# Patient Record
Sex: FEMALE | Race: WHITE | Employment: FULL TIME | URBAN - METROPOLITAN AREA
[De-identification: names, ages, dates, MRNs, and addresses within clinical notes are randomized per-mention and may not be internally consistent; named-entity substitution may affect disease eponyms.]

---

## 2017-04-21 ENCOUNTER — HOSPITAL ENCOUNTER (OUTPATIENT)
Dept: MAMMOGRAPHY | Age: 52
Discharge: HOME | End: 2017-04-21
Attending: OBSTETRICS & GYNECOLOGY
Payer: COMMERCIAL

## 2017-04-21 DIAGNOSIS — Z12.31 VISIT FOR SCREENING MAMMOGRAM: ICD-10-CM

## 2017-04-21 PROCEDURE — 77067 SCR MAMMO BI INCL CAD: CPT

## 2017-04-25 ENCOUNTER — HOSPITAL ENCOUNTER (OUTPATIENT)
Dept: ULTRASOUND IMAGING | Age: 52
Discharge: HOME | End: 2017-04-25
Attending: OBSTETRICS & GYNECOLOGY
Payer: COMMERCIAL

## 2017-04-25 ENCOUNTER — HOSPITAL ENCOUNTER (OUTPATIENT)
Dept: MAMMOGRAPHY | Age: 52
Discharge: HOME | End: 2017-04-25
Attending: OBSTETRICS & GYNECOLOGY
Payer: COMMERCIAL

## 2017-04-25 DIAGNOSIS — R92.8 ABNORMAL MAMMOGRAM OF RIGHT BREAST: ICD-10-CM

## 2017-04-25 PROCEDURE — 77065 DX MAMMO INCL CAD UNI: CPT

## 2017-04-25 PROCEDURE — 76642 ULTRASOUND BREAST LIMITED: CPT

## 2017-06-05 ENCOUNTER — OFFICE VISIT (OUTPATIENT)
Dept: PRIMARY CARE CLINIC | Age: 52
End: 2017-06-05

## 2017-06-05 VITALS
WEIGHT: 127.2 LBS | HEART RATE: 84 BPM | OXYGEN SATURATION: 98 % | TEMPERATURE: 98.3 F | BODY MASS INDEX: 19.97 KG/M2 | RESPIRATION RATE: 16 BRPM | DIASTOLIC BLOOD PRESSURE: 81 MMHG | SYSTOLIC BLOOD PRESSURE: 114 MMHG | HEIGHT: 67 IN

## 2017-06-05 DIAGNOSIS — J01.00 ACUTE MAXILLARY SINUSITIS, RECURRENCE NOT SPECIFIED: ICD-10-CM

## 2017-06-05 DIAGNOSIS — J01.10 ACUTE FRONTAL SINUSITIS, RECURRENCE NOT SPECIFIED: Primary | ICD-10-CM

## 2017-06-05 DIAGNOSIS — R05.9 COUGH: ICD-10-CM

## 2017-06-05 RX ORDER — AMOXICILLIN AND CLAVULANATE POTASSIUM 875; 125 MG/1; MG/1
1 TABLET, FILM COATED ORAL EVERY 12 HOURS
Qty: 14 TAB | Refills: 0 | Status: SHIPPED | OUTPATIENT
Start: 2017-06-05 | End: 2017-06-12

## 2017-06-05 RX ORDER — CODEINE PHOSPHATE AND GUAIFENESIN 10; 100 MG/5ML; MG/5ML
5 SOLUTION ORAL
Qty: 120 ML | Refills: 0 | Status: SHIPPED | OUTPATIENT
Start: 2017-06-05 | End: 2017-08-16

## 2017-06-05 NOTE — PATIENT INSTRUCTIONS
Sinusitis: Care Instructions  Your Care Instructions    Sinusitis is an infection of the lining of the sinus cavities in your head. Sinusitis often follows a cold. It causes pain and pressure in your head and face. In most cases, sinusitis gets better on its own in 1 to 2 weeks. But some mild symptoms may last for several weeks. Sometimes antibiotics are needed. Follow-up care is a key part of your treatment and safety. Be sure to make and go to all appointments, and call your doctor if you are having problems. It's also a good idea to know your test results and keep a list of the medicines you take. How can you care for yourself at home? · Take an over-the-counter pain medicine, such as acetaminophen (Tylenol), ibuprofen (Advil, Motrin), or naproxen (Aleve). Read and follow all instructions on the label. · If the doctor prescribed antibiotics, take them as directed. Do not stop taking them just because you feel better. You need to take the full course of antibiotics. · Be careful when taking over-the-counter cold or flu medicines and Tylenol at the same time. Many of these medicines have acetaminophen, which is Tylenol. Read the labels to make sure that you are not taking more than the recommended dose. Too much acetaminophen (Tylenol) can be harmful. · Breathe warm, moist air from a steamy shower, a hot bath, or a sink filled with hot water. Avoid cold, dry air. Using a humidifier in your home may help. Follow the directions for cleaning the machine. · Use saline (saltwater) nasal washes to help keep your nasal passages open and wash out mucus and bacteria. You can buy saline nose drops at a grocery store or drugstore. Or you can make your own at home by adding 1 teaspoon of salt and 1 teaspoon of baking soda to 2 cups of distilled water. If you make your own, fill a bulb syringe with the solution, insert the tip into your nostril, and squeeze gently. Sudhir Alessandro your nose.   · Put a hot, wet towel or a warm gel pack on your face 3 or 4 times a day for 5 to 10 minutes each time. · Try a decongestant nasal spray like oxymetazoline (Afrin). Do not use it for more than 3 days in a row. Using it for more than 3 days can make your congestion worse. When should you call for help? Call your doctor now or seek immediate medical care if:  · You have new or worse swelling or redness in your face or around your eyes. · You have a new or higher fever. Watch closely for changes in your health, and be sure to contact your doctor if:  · You have new or worse facial pain. · The mucus from your nose becomes thicker (like pus) or has new blood in it. · You are not getting better as expected. Where can you learn more? Go to http://padmaja-tiffany.info/. Enter C819 in the search box to learn more about \"Sinusitis: Care Instructions. \"  Current as of: July 29, 2016  Content Version: 11.2  © 4958-7722 Healthwise, Incorporated. Care instructions adapted under license by [employer] (which disclaims liability or warranty for this information). If you have questions about a medical condition or this instruction, always ask your healthcare professional. [place] [address] any warranty or liability for your use of this information.

## 2017-06-05 NOTE — PROGRESS NOTES
Subjective:   Tree Stubbs is a 54 y.o. female who complains of congestion, dry cough, headache, pressure in ears, laryngitis, teeth ache, productive cough of green phlegm, feeling exhausted, and bilateral sinus pain for 6 days, gradually worsening since that time. She reports sweats at night, no measured temperatures. She denies a history of wheezing. She does feel SOB with activity. Evaluation to date: none. Treatment to date: OTC products - Sudafed, Flonase, Claritin, Mucinex, and Ibuprofen. Patient does not smoke cigarettes. Relevant PMH: History reviewed. No pertinent past medical history. Past Surgical History:   Procedure Laterality Date    HX  SECTION Bilateral     IMPLANT BREAST SILICONE/EQ Bilateral          No Known Allergies      PCP - Dr. Zuleyka Vergara. Cici      Review of Systems  Pertinent items are noted in HPI. Objective:     Visit Vitals    /81 (BP 1 Location: Left arm, BP Patient Position: Sitting)    Pulse 84    Temp 98.3 °F (36.8 °C) (Oral)    Resp 16    Ht 5' 7\" (1.702 m)    Wt 127 lb 3.2 oz (57.7 kg)    SpO2 98%    BMI 19.92 kg/m2     General:  alert, cooperative, no distress, sickly appearance   Eyes: negative   Ears: normal TM's and external ear canals AU   Sinuses: Normal paranasal sinuses without tenderness   Mouth:  Lips, mucosa, and tongue normal. Teeth and gums normal and normal findings: oropharynx pink & moist without lesions or evidence of thrush   Neck: supple, symmetrical, trachea midline and no adenopathy. Heart: S1 and S2 normal, no murmurs noted. Lungs: clear to auscultation bilaterally        Assessment/Plan:       ICD-10-CM ICD-9-CM    1. Acute frontal sinusitis, recurrence not specified J01.10 461.1    2. Acute maxillary sinusitis, recurrence not specified J01.00 461.0    3. Cough R05 786.2      Augmentin as directed  Discussed the dx and tx of sinusitis. Suggested symptomatic OTC remedies. Antibiotics per orders.   RTC nirmal.      Elisabeth Deshpande, NP

## 2017-06-05 NOTE — MR AVS SNAPSHOT
Visit Information Date & Time Provider Department Dept. Phone Encounter #  
 6/5/2017  8:30 AM Miles Sinclair NP [address] [place] [address] [phone] [id number] Follow-up Instructions Return if symptoms worsen or fail to improve. Upcoming Health Maintenance Date Due DTaP/Tdap/Td series (1 - Tdap) 12/26/1988 PAP AKA CERVICAL CYTOLOGY 12/26/1988 INFLUENZA AGE 9 TO ADULT 8/1/2017 Allergies as of 6/5/2017  Review Complete On: 6/5/2017 By: Miles Sinclair NP No Known Allergies Current Immunizations  Never Reviewed No immunizations on file. Not reviewed this visit You Were Diagnosed With   
  
 Codes Comments Acute frontal sinusitis, recurrence not specified    -  Primary ICD-10-CM: J01.10 ICD-9-CM: 954.1 Acute maxillary sinusitis, recurrence not specified     ICD-10-CM: J01.00 ICD-9-CM: 461.0 Cough     ICD-10-CM: R05 ICD-9-CM: 809. 2 Vitals BP Pulse Temp Resp Height(growth percentile) Weight(growth percentile) 114/81 (BP 1 Location: Left arm, BP Patient Position: Sitting) 84 98.3 °F (36.8 °C) (Oral) 16 5' 7\" (1.702 m) 127 lb 3.2 oz (57.7 kg) SpO2 BMI OB Status Smoking Status 98% 19.92 kg/m2 Having regular periods Never Smoker Vitals History BMI and BSA Data Body Mass Index Body Surface Area  
 19.92 kg/m 2 1.65 m 2 Preferred Pharmacy Pharmacy Name Phone [id number] [facility] [phone] Your Updated Medication List  
  
   
This list is accurate as of: 6/5/17  9:01 AM.  Always use your most recent med list.  
  
  
  
  
 amoxicillin-clavulanate 875-125 mg per tablet Commonly known as:  AUGMENTIN Take 1 Tab by mouth every twelve (12) hours for 7 days. guaiFENesin-codeine 100-10 mg/5 mL solution Commonly known as:  Boo Maggiek  
 Take 5 mL by mouth three (3) times daily as needed for Cough. Max Daily Amount: 15 mL.  
  
 multivitamin tablet Commonly known as:  ONE A DAY Take 1 Tab by mouth daily. norgestimate-ethinyl estradiol 0.18/0.215/0.25 mg-25 mcg Tab Commonly known as:  ORTHO TRI-CYCLEN LO  
  
  
  
  
Prescriptions Printed Refills  
 guaiFENesin-codeine (GUAIATUSSIN AC) 100-10 mg/5 mL solution 0 Sig: Take 5 mL by mouth three (3) times daily as needed for Cough. Max Daily Amount: 15 mL. Class: Print Route: Oral  
  
Prescriptions Sent to Pharmacy Refills  
 amoxicillin-clavulanate (AUGMENTIN) 875-125 mg per tablet 0 Sig: Take 1 Tab by mouth every twelve (12) hours for 7 days. Class: Normal  
 Pharmacy: [address] #: [phone] Route: Oral  
  
Follow-up Instructions Return if symptoms worsen or fail to improve. To-Do List   
 10/26/2017 8:30 AM  
  Appointment with [facility] 1 at [address] [place] [address] ([phone]) Shower or bathe using soap and water. Do not use deodorant, powder, perfumes, or lotion the day of your exam.  If your prior mammograms were not performed at [place] [address] please bring films with you or forward prior images 2 days before your procedure. If patient is not a callback diagnostic, the patient must have an order/script from the physician for the diagnostic. Please bring it on the day of the mammogram or have it faxed to the department. [facility] [place]  [phone] [facility] [phone] [address] [phone] [facility] [phone] [facility] [phone] Petr Humphrey [phone]  
  
 10/26/2017 9:00 AM  
  Appointment with [facility] 2 at [facility] Ultrasound ([phone]) Shower or bathe using soap and water. Do not use deodorant, powder, perfumes, or lotion.  If your prior films were not performed at a local [address] [facility] facility please bring or forward prior images 2 days before procedure. Patient Instructions Sinusitis: Care Instructions Your Care Instructions Sinusitis is an infection of the lining of the sinus cavities in your head. Sinusitis often follows a cold. It causes pain and pressure in your head and face. In most cases, sinusitis gets better on its own in 1 to 2 weeks. But some mild symptoms may last for several weeks. Sometimes antibiotics are needed. Follow-up care is a key part of your treatment and safety. Be sure to make and go to all appointments, and call your doctor if you are having problems. It's also a good idea to know your test results and keep a list of the medicines you take. How can you care for yourself at home? · Take an over-the-counter pain medicine, such as acetaminophen (Tylenol), ibuprofen (Advil, Motrin), or naproxen (Aleve). Read and follow all instructions on the label. · If the doctor prescribed antibiotics, take them as directed. Do not stop taking them just because you feel better. You need to take the full course of antibiotics. · Be careful when taking over-the-counter cold or flu medicines and Tylenol at the same time. Many of these medicines have acetaminophen, which is Tylenol. Read the labels to make sure that you are not taking more than the recommended dose. Too much acetaminophen (Tylenol) can be harmful. · Breathe warm, moist air from a steamy shower, a hot bath, or a sink filled with hot water. Avoid cold, dry air. Using a humidifier in your home may help. Follow the directions for cleaning the machine. · Use saline (saltwater) nasal washes to help keep your nasal passages open and wash out mucus and bacteria. You can buy saline nose drops at a grocery store or drugstore.  Or you can make your own at home by adding 1 teaspoon of salt and 1 teaspoon of baking soda to 2 cups of distilled water. If you make your own, fill a bulb syringe with the solution, insert the tip into your nostril, and squeeze gently. Little Drums your nose. · Put a hot, wet towel or a warm gel pack on your face 3 or 4 times a day for 5 to 10 minutes each time. · Try a decongestant nasal spray like oxymetazoline (Afrin). Do not use it for more than 3 days in a row. Using it for more than 3 days can make your congestion worse. When should you call for help? Call your doctor now or seek immediate medical care if: 
· You have new or worse swelling or redness in your face or around your eyes. · You have a new or higher fever. Watch closely for changes in your health, and be sure to contact your doctor if: 
· You have new or worse facial pain. · The mucus from your nose becomes thicker (like pus) or has new blood in it. · You are not getting better as expected. Where can you learn more? Go to http://padmaja-tiffany.info/. Enter W054 in the search box to learn more about \"Sinusitis: Care Instructions. \" Current as of: July 29, 2016 Content Version: 11.2 © 5359-4663 [employer]. Care instructions adapted under license by [employer] (which disclaims liability or warranty for this information). If you have questions about a medical condition or this instruction, always ask your healthcare professional. [place] [address] any warranty or liability for your use of this information. Introducing \[facility]\"" & HEALTH SERVICES! Vilma Patterson introduces [employer] patient portal. Now you can access parts of your medical record, email your doctor's office, and request medication refills online. 1. In your internet browser, go to [email or web address] 2. Click on the First Time User? Click Here link in the Sign In box. You will see the New Member Sign Up page. 3. Enter your [employer] Access Code exactly as it appears below.  You will not need to use this code after youve completed the sign-up process. If you do not sign up before the expiration date, you must request a new code. · [employer] Access Code: [email or web address] Expires: 9/3/2017  8:59 AM 
 
4. Enter the last four digits of your Social Security Number (xxxx) and Date of Birth (mm/dd/yyyy) as indicated and click Submit. You will be taken to the next sign-up page. 5. Create a [employer] ID. This will be your [employer] login ID and cannot be changed, so think of one that is secure and easy to remember. 6. Create a [employer] password. You can change your password at any time. 7. Enter your Password Reset Question and Answer. This can be used at a later time if you forget your password. 8. Enter your e-mail address. You will receive e-mail notification when new information is available in [address] 9. Click Sign Up. You can now view and download portions of your medical record. 10. Click the Download Summary menu link to download a portable copy of your medical information. If you have questions, please visit the Frequently Asked Questions section of the [employer] website. Remember, [employer] is NOT to be used for urgent needs. For medical emergencies, dial 911. Now available from your iPhone and Android! Please provide this summary of care documentation to your next provider. Your primary care clinician is listed as [address] Willett. If you have any questions after today's visit, please call [phone].

## 2017-06-05 NOTE — PROGRESS NOTES
Chief Complaint   Patient presents with    Cold Symptoms     c/o cough, congestion, sinus pressure, sore throat, since last Tuesday, has tried Flonase,Claritin, Mucinex and ibuprofen to help

## 2017-07-23 ENCOUNTER — HOSPITAL ENCOUNTER (EMERGENCY)
Age: 52
Discharge: HOME | End: 2017-07-24
Attending: EMERGENCY MEDICINE | Admitting: EMERGENCY MEDICINE
Payer: COMMERCIAL

## 2017-07-23 VITALS
RESPIRATION RATE: 18 BRPM | BODY MASS INDEX: 20.9 KG/M2 | SYSTOLIC BLOOD PRESSURE: 136 MMHG | HEIGHT: 65 IN | WEIGHT: 125.44 LBS | TEMPERATURE: 97.8 F | HEART RATE: 66 BPM | DIASTOLIC BLOOD PRESSURE: 83 MMHG | OXYGEN SATURATION: 97 %

## 2017-07-23 DIAGNOSIS — R73.9 HYPERGLYCEMIA: Primary | ICD-10-CM

## 2017-07-23 DIAGNOSIS — E11.65 TYPE 2 DIABETES MELLITUS WITH HYPERGLYCEMIA, WITHOUT LONG-TERM CURRENT USE OF INSULIN (HCC): ICD-10-CM

## 2017-07-23 LAB
GLUCOSE BLD STRIP.AUTO-MCNC: >600 MG/DL (ref 65–100)
SERVICE CMNT-IMP: ABNORMAL

## 2017-07-23 PROCEDURE — 96375 TX/PRO/DX INJ NEW DRUG ADDON: CPT

## 2017-07-23 PROCEDURE — 82962 GLUCOSE BLOOD TEST: CPT

## 2017-07-23 PROCEDURE — 96361 HYDRATE IV INFUSION ADD-ON: CPT

## 2017-07-23 PROCEDURE — 96374 THER/PROPH/DIAG INJ IV PUSH: CPT

## 2017-07-23 PROCEDURE — 99284 EMERGENCY DEPT VISIT MOD MDM: CPT

## 2017-07-24 ENCOUNTER — TELEPHONE (OUTPATIENT)
Dept: ENDOCRINOLOGY | Age: 52
End: 2017-07-24

## 2017-07-24 LAB
ALBUMIN SERPL BCP-MCNC: 3.2 G/DL (ref 3.5–5)
ALBUMIN/GLOB SERPL: 1 {RATIO} (ref 1.1–2.2)
ALP SERPL-CCNC: 51 U/L (ref 45–117)
ALT SERPL-CCNC: 19 U/L (ref 12–78)
ANION GAP BLD CALC-SCNC: 10 MMOL/L (ref 5–15)
APPEARANCE UR: CLEAR
AST SERPL W P-5'-P-CCNC: 13 U/L (ref 15–37)
BACTERIA URNS QL MICRO: NEGATIVE /HPF
BASE DEFICIT BLDV-SCNC: 0.3 MMOL/L
BASOPHILS # BLD AUTO: 0 K/UL (ref 0–0.1)
BASOPHILS # BLD: 0 % (ref 0–1)
BDY SITE: ABNORMAL
BILIRUB SERPL-MCNC: 0.5 MG/DL (ref 0.2–1)
BILIRUB UR QL: NEGATIVE
BREATHS.SPONTANEOUS ON VENT: 18
BUN SERPL-MCNC: 20 MG/DL (ref 6–20)
BUN/CREAT SERPL: 19 (ref 12–20)
CALCIUM SERPL-MCNC: 8.6 MG/DL (ref 8.5–10.1)
CHLORIDE SERPL-SCNC: 91 MMOL/L (ref 97–108)
CO2 SERPL-SCNC: 26 MMOL/L (ref 21–32)
COLOR UR: ABNORMAL
CREAT SERPL-MCNC: 1.05 MG/DL (ref 0.55–1.02)
EOSINOPHIL # BLD: 0.1 K/UL (ref 0–0.4)
EOSINOPHIL NFR BLD: 1 % (ref 0–7)
EPITH CASTS URNS QL MICRO: ABNORMAL /LPF
ERYTHROCYTE [DISTWIDTH] IN BLOOD BY AUTOMATED COUNT: 12.6 % (ref 11.5–14.5)
FIO2 ON VENT: 21 %
GLOBULIN SER CALC-MCNC: 3.1 G/DL (ref 2–4)
GLUCOSE BLD STRIP.AUTO-MCNC: 211 MG/DL (ref 65–100)
GLUCOSE BLD STRIP.AUTO-MCNC: 373 MG/DL (ref 65–100)
GLUCOSE BLD STRIP.AUTO-MCNC: >600 MG/DL (ref 65–100)
GLUCOSE SERPL-MCNC: 694 MG/DL (ref 65–100)
GLUCOSE UR STRIP.AUTO-MCNC: >1000 MG/DL
HCO3 BLDV-SCNC: 24 MMOL/L (ref 23–28)
HCT VFR BLD AUTO: 34.6 % (ref 35–47)
HGB BLD-MCNC: 12.5 G/DL (ref 11.5–16)
HGB UR QL STRIP: NEGATIVE
HYALINE CASTS URNS QL MICRO: ABNORMAL /LPF (ref 0–5)
KETONES UR QL STRIP.AUTO: ABNORMAL MG/DL
LEUKOCYTE ESTERASE UR QL STRIP.AUTO: NEGATIVE
LYMPHOCYTES # BLD AUTO: 22 % (ref 12–49)
LYMPHOCYTES # BLD: 1.4 K/UL (ref 0.8–3.5)
MCH RBC QN AUTO: 35.5 PG (ref 26–34)
MCHC RBC AUTO-ENTMCNC: 36.1 G/DL (ref 30–36.5)
MCV RBC AUTO: 98.3 FL (ref 80–99)
MONOCYTES # BLD: 0.6 K/UL (ref 0–1)
MONOCYTES NFR BLD AUTO: 9 % (ref 5–13)
NEUTS SEG # BLD: 4.5 K/UL (ref 1.8–8)
NEUTS SEG NFR BLD AUTO: 68 % (ref 32–75)
NITRITE UR QL STRIP.AUTO: NEGATIVE
PCO2 BLDV: 36 MMHG (ref 41–51)
PH BLDV: 7.43 [PH] (ref 7.32–7.42)
PH UR STRIP: 6 [PH] (ref 5–8)
PLATELET # BLD AUTO: 221 K/UL (ref 150–400)
PO2 BLDV: 68 MMHG (ref 25–40)
POTASSIUM SERPL-SCNC: 3.9 MMOL/L (ref 3.5–5.1)
PROT SERPL-MCNC: 6.3 G/DL (ref 6.4–8.2)
PROT UR STRIP-MCNC: NEGATIVE MG/DL
RBC # BLD AUTO: 3.52 M/UL (ref 3.8–5.2)
RBC #/AREA URNS HPF: ABNORMAL /HPF (ref 0–5)
SAO2 % BLDV: 94 % (ref 65–88)
SAO2% DEVICE SAO2% SENSOR NAME: ABNORMAL
SERVICE CMNT-IMP: ABNORMAL
SODIUM SERPL-SCNC: 127 MMOL/L (ref 136–145)
SP GR UR REFRACTOMETRY: 1.02 (ref 1–1.03)
SPECIMEN SITE: ABNORMAL
UA: UC IF INDICATED,UAUC: ABNORMAL
UROBILINOGEN UR QL STRIP.AUTO: 0.2 EU/DL (ref 0.2–1)
WBC # BLD AUTO: 6.6 K/UL (ref 3.6–11)
WBC URNS QL MICRO: ABNORMAL /HPF (ref 0–4)

## 2017-07-24 PROCEDURE — 81001 URINALYSIS AUTO W/SCOPE: CPT | Performed by: EMERGENCY MEDICINE

## 2017-07-24 PROCEDURE — 82962 GLUCOSE BLOOD TEST: CPT

## 2017-07-24 PROCEDURE — 36415 COLL VENOUS BLD VENIPUNCTURE: CPT | Performed by: EMERGENCY MEDICINE

## 2017-07-24 PROCEDURE — 74011636637 HC RX REV CODE- 636/637: Performed by: EMERGENCY MEDICINE

## 2017-07-24 PROCEDURE — 80053 COMPREHEN METABOLIC PANEL: CPT | Performed by: EMERGENCY MEDICINE

## 2017-07-24 PROCEDURE — 85025 COMPLETE CBC W/AUTO DIFF WBC: CPT | Performed by: EMERGENCY MEDICINE

## 2017-07-24 PROCEDURE — 74011250636 HC RX REV CODE- 250/636: Performed by: EMERGENCY MEDICINE

## 2017-07-24 PROCEDURE — 82803 BLOOD GASES ANY COMBINATION: CPT | Performed by: EMERGENCY MEDICINE

## 2017-07-24 RX ORDER — KETOROLAC TROMETHAMINE 30 MG/ML
15 INJECTION, SOLUTION INTRAMUSCULAR; INTRAVENOUS
Status: DISCONTINUED | OUTPATIENT
Start: 2017-07-24 | End: 2017-07-24

## 2017-07-24 RX ORDER — KETOROLAC TROMETHAMINE 30 MG/ML
15 INJECTION, SOLUTION INTRAMUSCULAR; INTRAVENOUS
Status: COMPLETED | OUTPATIENT
Start: 2017-07-24 | End: 2017-07-24

## 2017-07-24 RX ADMIN — INSULIN HUMAN 10 UNITS: 100 INJECTION, SOLUTION PARENTERAL at 00:54

## 2017-07-24 RX ADMIN — SODIUM CHLORIDE 1000 ML: 900 INJECTION, SOLUTION INTRAVENOUS at 00:05

## 2017-07-24 RX ADMIN — SODIUM CHLORIDE 1000 ML: 900 INJECTION, SOLUTION INTRAVENOUS at 00:45

## 2017-07-24 RX ADMIN — KETOROLAC TROMETHAMINE 15 MG: 30 INJECTION, SOLUTION INTRAMUSCULAR at 02:13

## 2017-07-24 RX ADMIN — SODIUM CHLORIDE 1000 ML: 900 INJECTION, SOLUTION INTRAVENOUS at 01:44

## 2017-07-24 NOTE — TELEPHONE ENCOUNTER
Fransisco Light received a call from Dr. Tigre Rivera about this patient and he apparently reached out to both you and I to discuss her. She was in the ER yesterday with a blood sugar over 500 and was given fluids and IV insulin and told to f/u with Lucy Al. He said she is on glipizide as an outpatient and despite this her sugars are staying high. She is think with a BMI of 20.8 and I told Dr. Delicia Carson that she likely has Type 1 diabetes and therefore needs to start insulin right away. I advised him to start her on Lantus 18 units daily and titrate up her dose by 2 units every 3 days until her blood sugar is under 150. I told him I would speak with you to see if you could get her in to see you in the next few weeks as I don't have any openings until September. If so, please have the office call her to schedule an appointment.     Thanks,  [employer]

## 2017-07-24 NOTE — ED NOTES
Pt reports recent diagnosis of diabetes. Pt has been prescribed 5 mg glipizide. Pt reports that she checked her blood sugar with her 's son's glucometer and it read high. Pt tearful in exam room.

## 2017-07-24 NOTE — DISCHARGE INSTRUCTIONS

## 2017-07-24 NOTE — TELEPHONE ENCOUNTER
Dr. Josh Oden is calling for in regards to his patient Gayathri Walker who is in the ER with blood sugars of 600. Please return his call for advice to: [phone]. Thank you.   Cosme Pretty

## 2017-07-24 NOTE — ED PROVIDER NOTES
HPI Comments: Kulwant Salmeron, 54 y.o. female, presents ambulatory to ED [facility] ED with cc of high blood sugar and abnormal lab readings from her PCP. Pt notes she has recently been sick with a sinus infection and bronchitis and after having vision blurriness, pt was evaluated by her PCP. Pt's PCP changed her diet and patient's vision improved. Pt received a phone call x 3 days while on vacation that her blood sugar was above 300. Pt proceeded to take prescribed medication with no help. Pt tested herself on her home monitor twice today with each reading being high. Pt notes an increase in water intake. Patient specifically denies fever, chills, nausea, vomiting, diarrhea, or headache. PCP: Arias Radford MD    Social history significant for: - Tobacco, + EtOH, - Illicit Drug Use    There are no other complaints, changes, or physical findings at this time. The history is provided by the patient. No  was used. No past medical history on file. Past Surgical History:   Procedure Laterality Date    HX  SECTION Bilateral     IMPLANT BREAST SILICONE/EQ Bilateral              Family History:   Problem Relation Age of Onset    Hypertension Mother     Stroke Mother     Heart Disease Mother      COPD    No Known Problems Father     No Known Problems Sister     No Known Problems Brother     No Known Problems Maternal Aunt     No Known Problems Maternal Uncle     No Known Problems Paternal Aunt     No Known Problems Paternal Uncle     No Known Problems Maternal Grandmother     No Known Problems Maternal Grandfather     No Known Problems Paternal Grandmother     No Known Problems Paternal Grandfather        Social History     Social History    Marital status:      Spouse name: N/A    Number of children: N/A    Years of education: N/A     Occupational History    Not on file.      Social History Main Topics    Smoking status: Never Smoker    Smokeless tobacco: Never Used    Alcohol use 0.0 oz/week     0 Standard drinks or equivalent per week    Drug use: No    Sexual activity: No     Other Topics Concern    Not on file     Social History Narrative         ALLERGIES: Tree nut    Review of Systems   Constitutional: Negative for chills and fever. Respiratory: Negative for cough and shortness of breath. Cardiovascular: Negative for chest pain. Gastrointestinal: Negative for constipation, diarrhea, nausea and vomiting. Neurological: Negative for weakness, numbness and headaches. All other systems reviewed and are negative. Vitals:    07/23/17 2335   BP: 136/83   Pulse: 66   Resp: 18   Temp: 97.8 °F (36.6 °C)   SpO2: 97%   Weight: 56.9 kg (125 lb 7.1 oz)   Height: 5' 5\" (1.651 m)            Physical Exam   Constitutional: She is oriented to person, place, and time. She appears well-developed and well-nourished. HENT:   Head: Normocephalic and atraumatic. Eyes: Conjunctivae and EOM are normal.   Neck: Normal range of motion. Neck supple. Cardiovascular: Normal rate and regular rhythm. Pulmonary/Chest: Effort normal and breath sounds normal. No respiratory distress. Abdominal: Soft. She exhibits no distension. There is no tenderness. Musculoskeletal: Normal range of motion. Neurological: She is alert and oriented to person, place, and time. Skin: Skin is warm and dry. Psychiatric: She has a normal mood and affect. Nursing note and vitals reviewed. MDM  Number of Diagnoses or Management Options  Hyperglycemia:   Type 2 diabetes mellitus with hyperglycemia, without long-term current use of insulin [facility]):   Diagnosis management comments: Patient presents with hyperglycemia. DDx: uncontrolled diabetes 2/2 noncompliance, infection, stressors. Will obtain labs to r/o DKA or other metabolic anomalies, treat with insulin and fluids.     I have recommended the following steps for improving diabetic care and outcome to her: diabetic diet discussed in detail, written exchange diet given and home glucose monitoring emphasized. A followup visit will be scheduled in the near future with patients PCP or endocrinologist to review and reinforce the importance of careful diabetic control to improve long term outcomes. Amount and/or Complexity of Data Reviewed  Clinical lab tests: ordered and reviewed  Review and summarize past medical records: yes    Patient Progress  Patient progress: stable    ED Course       Procedures    LABORATORY TESTS:  Recent Results (from the past 12 hour(s))   GLUCOSE, POC    Collection Time: 07/23/17 11:37 PM   Result Value Ref Range    Glucose (POC) >600 (HH) 65 - 100 mg/dL    Performed by [employer] PULLIAM    CBC WITH AUTOMATED DIFF    Collection Time: 07/24/17 12:42 AM   Result Value Ref Range    WBC 6.6 3.6 - 11.0 K/uL    RBC 3.52 (L) 3.80 - 5.20 M/uL    HGB 12.5 11.5 - 16.0 g/dL    HCT 34.6 (L) 35.0 - 47.0 %    MCV 98.3 80.0 - 99.0 FL    MCH 35.5 (H) 26.0 - 34.0 PG    MCHC 36.1 30.0 - 36.5 g/dL    RDW 12.6 11.5 - 14.5 %    PLATELET 388 464 - 457 K/uL    NEUTROPHILS 68 32 - 75 %    LYMPHOCYTES 22 12 - 49 %    MONOCYTES 9 5 - 13 %    EOSINOPHILS 1 0 - 7 %    BASOPHILS 0 0 - 1 %    ABS. NEUTROPHILS 4.5 1.8 - 8.0 K/UL    ABS. LYMPHOCYTES 1.4 0.8 - 3.5 K/UL    ABS. MONOCYTES 0.6 0.0 - 1.0 K/UL    ABS. EOSINOPHILS 0.1 0.0 - 0.4 K/UL    ABS.  BASOPHILS 0.0 0.0 - 0.1 K/UL   METABOLIC PANEL, COMPREHENSIVE    Collection Time: 07/24/17 12:42 AM   Result Value Ref Range    Sodium 127 (L) 136 - 145 mmol/L    Potassium 3.9 3.5 - 5.1 mmol/L    Chloride 91 (L) 97 - 108 mmol/L    CO2 26 21 - 32 mmol/L    Anion gap 10 5 - 15 mmol/L    Glucose 694 (HH) 65 - 100 mg/dL    BUN 20 6 - 20 MG/DL    Creatinine 1.05 (H) 0.55 - 1.02 MG/DL    BUN/Creatinine ratio 19 12 - 20      GFR est AA >60 >60 ml/min/1.73m2    GFR est non-AA 56 (L) >60 ml/min/1.73m2    Calcium 8.6 8.5 - 10.1 MG/DL    Bilirubin, total 0.5 0.2 - 1.0 MG/DL    ALT (SGPT) 19 12 - 78 U/L    AST (SGOT) 13 (L) 15 - 37 U/L    Alk.  phosphatase 51 45 - 117 U/L    Protein, total 6.3 (L) 6.4 - 8.2 g/dL    Albumin 3.2 (L) 3.5 - 5.0 g/dL    Globulin 3.1 2.0 - 4.0 g/dL    A-G Ratio 1.0 (L) 1.1 - 2.2     URINALYSIS W/ REFLEX CULTURE    Collection Time: 07/24/17 12:42 AM   Result Value Ref Range    Color YELLOW/STRAW      Appearance CLEAR CLEAR      Specific gravity 1.019 1.003 - 1.030      pH (UA) 6.0 5.0 - 8.0      Protein NEGATIVE  NEG mg/dL    Glucose >1000 (A) NEG mg/dL    Ketone TRACE (A) NEG mg/dL    Bilirubin NEGATIVE  NEG      Blood NEGATIVE  NEG      Urobilinogen 0.2 0.2 - 1.0 EU/dL    Nitrites NEGATIVE  NEG      Leukocyte Esterase NEGATIVE  NEG      WBC 0-4 0 - 4 /hpf    RBC 0-5 0 - 5 /hpf    Epithelial cells FEW FEW /lpf    Bacteria NEGATIVE  NEG /hpf    UA:UC IF INDICATED CULTURE NOT INDICATED BY UA RESULT CNI      Hyaline cast 0-2 0 - 5 /lpf   VENOUS BLOOD GAS    Collection Time: 07/24/17 12:43 AM   Result Value Ref Range    VENOUS PH 7.43 (H) 7.32 - 7.42      VENOUS PCO2 36 (L) 41 - 51 mmHg    VENOUS PO2 68 (H) 25 - 40 mmHg    VENOUS O2 SATURATION 94 (H) 65 - 88 %    VENOUS BICARBONATE 24 23 - 28 mmol/L    VENOUS BASE DEFICIT 0.3 mmol/L    O2 METHOD ROOM AIR      FIO2 21 %    SPONTANEOUS RATE 18.0      Sample source VENOUS      SITE OTHER     GLUCOSE, POC    Collection Time: 07/24/17 12:47 AM   Result Value Ref Range    Glucose (POC) >600 (HH) 65 - 100 mg/dL    Performed by Roberta Mcclain, POC    Collection Time: 07/24/17  1:38 AM   Result Value Ref Range    Glucose (POC) 373 (H) 65 - 100 mg/dL    Performed by Gerald Hernandez    GLUCOSE, POC    Collection Time: 07/24/17  2:30 AM   Result Value Ref Range    Glucose (POC) 211 (H) 65 - 100 mg/dL    Performed by Gerald Hernandez      MEDICATIONS GIVEN:  Medications   sodium chloride 0.9 % bolus infusion 1,000 mL (0 mL IntraVENous IV Completed 7/24/17 7041)   sodium chloride 0.9 % bolus infusion 1,000 mL (0 mL IntraVENous IV Completed 7/24/17 0101)   insulin regular (NOVOLIN R, HUMULIN R) injection 10 Units (10 Units IntraVENous Given 7/24/17 0054)   sodium chloride 0.9 % bolus infusion 1,000 mL (0 mL IntraVENous IV Completed 7/24/17 0244)   ketorolac (TORADOL) injection 15 mg (15 mg IntraVENous Given 7/24/17 0213)       IMPRESSION:  1. Hyperglycemia    2. Type 2 diabetes mellitus with hyperglycemia, without long-term current use of insulin (HCC)        PLAN:  1. Discharge Medication List as of 7/24/2017  1:54 AM        2. Follow-up Information     Follow up With Details Comments Contact Info    Juju Wade MD Schedule an appointment as soon as possible for a visit  [address] [facility]  [phone]          Return to ED if worse     Discharge Note:  2:30 AM  The pt is ready for discharge. The pt's signs, symptoms, diagnosis, and discharge instructions have been discussed and pt has conveyed their understanding. The pt is to follow up as recommended or return to ER should their symptoms worsen. Plan has been discussed and pt is in agreement. This note is prepared by Gladys Salinas, acting as a Scribe for Madeline Jenkins MD.    Madeline Jenkins MD: The scribe's documentation has been prepared under my direction and personally reviewed by me in its entirety. I confirm that the notes above accurately reflects all work, treatment, procedures, and medical decision making performed by me.

## 2017-07-27 ENCOUNTER — TELEPHONE (OUTPATIENT)
Dept: ENDOCRINOLOGY | Age: 52
End: 2017-07-27

## 2017-07-27 NOTE — TELEPHONE ENCOUNTER
Received call from Dr. Bang Ponce who reports that Ms Jazmyn Barr called stating that last night she had an episode of diaphoresis and her BG was in the 60's. She ate PB and drank some juice and her BGs improved to the 70's. I recommended she decrease her Lantus from 18 units daily to 15 units daily. I will be seeing Ms Jazmyn Barr as a new patient on 8/16/17 and until that time she can call the office or sent me a Airst[employer] message about her BGs as needed.

## 2017-08-16 ENCOUNTER — OFFICE VISIT (OUTPATIENT)
Dept: ENDOCRINOLOGY | Age: 52
End: 2017-08-16

## 2017-08-16 VITALS
WEIGHT: 126.2 LBS | DIASTOLIC BLOOD PRESSURE: 73 MMHG | HEART RATE: 66 BPM | BODY MASS INDEX: 21.02 KG/M2 | SYSTOLIC BLOOD PRESSURE: 114 MMHG | HEIGHT: 65 IN

## 2017-08-16 DIAGNOSIS — E13.9 OTHER SPECIFIED DIABETES MELLITUS WITHOUT COMPLICATION, WITH LONG-TERM CURRENT USE OF INSULIN (HCC): Primary | ICD-10-CM

## 2017-08-16 DIAGNOSIS — Z79.4 OTHER SPECIFIED DIABETES MELLITUS WITHOUT COMPLICATION, WITH LONG-TERM CURRENT USE OF INSULIN (HCC): Primary | ICD-10-CM

## 2017-08-16 RX ORDER — LANCETS 33 GAUGE
EACH MISCELLANEOUS
Qty: 200 LANCET | Refills: 3 | Status: SHIPPED | OUTPATIENT
Start: 2017-08-16 | End: 2020-08-20 | Stop reason: SDUPTHER

## 2017-08-16 RX ORDER — INSULIN GLARGINE 100 [IU]/ML
INJECTION, SOLUTION SUBCUTANEOUS
Refills: 12 | COMMUNITY
Start: 2017-07-24 | End: 2017-11-09 | Stop reason: SDUPTHER

## 2017-08-16 NOTE — PROGRESS NOTES
Chief Complaint   Patient presents with    Diabetes     pcp and pharmacy verified. Records reviewed  History of Present Illness: Ashleigh Recinos is a 54 y.o. female who I was asked to see in consult, by Dr. Jay Reyes, for evaluation of diabetes. Pt notes that over Memorial day weekend she \"came down with a Virus and sinus infection\" She was put on Abx and she noted polyuria, polydipsia and blurred vision. She had also lost 10 pounds in a short period of time. She saw her eye doctor who noted swelling in her cornia (will request these records). The swelling improved but did not normalize, her eye doctor recommended she get labs drawn and her PCP cecilia labs, her BG was 300 and her PCP started her on Glipizide. She continued to have symptoms so she checked her BG on a glucometer said \"high\" so she went to the ED and her BG >600. She received IVF and insulin and followed up with her PCP. Labs by PCP showed 's, BUN/Cr 15/0.87, AST/ALT 19/19, normal lipid panel, Urine MA/Cr and normal TFTs. She does have iron deficiency anemia. Her A1C was 13.1%. AB testing showed positive [place] and CRP. She has not been tested for anti-pancreatic Abs. Her PCP called and he was recommended by Dr. Vicenta August to start Lantus 18 units daily. She notes her BGs did improve some, but she began to have low BGs so she decreased her dose to 15 units. She continued to have low BGs and she moved her Lantus to the Morning and decreased to 10 units. She notes that on the lower dose of Lantus 10 units she will have a lower BG by noon. She feels that the 12 units did better overall however. On the Lantus 12 units at 10PM she was having FBGs in the 60's. When she moved the Lantus 12 units to the AM she was not having the low FBGs, but her Noon BGs were in the 's. When she decreased the Lantus to 10 units her PM BGs increased to the 200 range.     Pt notes that in 2013 she had issues of recurrent infections and joint swelling. She was tested for RA and was told that \"she had an autoimmune reaction\". This did resolve and she had not had issues till 2017. Pt notes that about this time last year () she had an insurance physical and her BG was 70 with an A1C of 5%. Pt notes her grandmother  from pancreatic cancer and her brother has \"thyroid issues\". No other family hx of DM. She has been changing her diet to limit her carbs to under  grams per day. - breakfast: She has breakfast around 7AM, yesterday she had a turkey hotdog, \"muscle milk\" and coffee (creamer)  - lunch: She has lunch around [place], yesterday she had 4 egg whites, turkey logan and yogurt  - She had \"muscle milk\" around 230PM  - dinner: She has dinner around 7PM, last night she had a Rosario salad    Exercise consists of treadmill/cardio for 30 minutes in the morning. She also has lots of house and yard work. No history of vascular disease. No history of retinopathy, neuropathy, or nephropathy. Last eye exam was 2017 (will request these records). Past Surgical History:   Procedure Laterality Date    HX  SECTION Bilateral     IMPLANT BREAST SILICONE/EQ Bilateral          Current Outpatient Prescriptions   Medication Sig    LANTUS SOLOSTAR 100 unit/mL (3 mL) inpn 10 units in AM    lancets (BD ULTRA FINE LANCETS) 33 gauge misc Check blood sugar 5 times per day    norgestimate-ethinyl estradiol (ORTHO TRI-CYCLEN LO) 0.18/0.215/0.25 mg-25 mcg tab     multivitamin (ONE A DAY) tablet Take 1 Tab by mouth daily. No current facility-administered medications for this visit.       Allergies   Allergen Reactions    Tree Nut Anaphylaxis     Family History   Problem Relation Age of Onset    Hypertension Mother     Stroke Mother     Heart Disease Mother      COPD    Lung Disease Mother      COPD    No Known Problems Father     No Known Problems Brother     Cancer Maternal Aunt      Lung, Story and Colon    Cancer Maternal Grandmother      Pancreatic    Thyroid Disease Brother     Diabetes Neg Hx      Social History     Social History    Marital status:      Spouse name: N/A    Number of children: N/A    Years of education: N/A     Occupational History    Not on file. Social History Main Topics    Smoking status: Never Smoker    Smokeless tobacco: Never Used    Alcohol use 0.0 oz/week     0 Standard drinks or equivalent per week      Comment: social    Drug use: No    Sexual activity: No     Other Topics Concern    Not on file     Social History Narrative     Review of Systems:  - Negative except as noted in HPI    Physical Examination:  Blood pressure 114/73, pulse 66, height 5' 5\" (1.651 m), weight 126 lb 3.2 oz (57.2 kg).   - General: pleasant, no distress, good eye contact  - HEENT: no exopthalmos, no periorbital edema, no scleral/conjunctival injection, EOMI, no lid lag or stare  - Neck: supple, no thyromegaly, masses, lymph nodes, or carotid bruits, no supraclavicular or dorsocervical fat pads  - Cardiovascular: regular, normal rate, normal S1 and S2, no murmurs/rubs/gallops, 2+ dorsalis pedis pulses bilaterally  - Respiratory: clear to auscultation bilaterally  - Gastrointestinal: soft, nontender, nondistended, no masses, no hepatosplenomegaly  - Musculoskeletal: no proximal muscle weakness in upper or lower extremities  - Integumentary: no acanthosis nigricans, no abdominal striae, no rashes, no edema, no foot ulcers  - Neurological: intact sensation to monofilament 4/4 locations, intact vibratory sensation at great toes bilaterally,   - Psychiatric: normal mood and affect    Data Reviewed:   Component      Latest Ref Rng & Units 7/24/2017 7/24/2017          12:42 AM 12:42 AM   Sodium      136 - 145 mmol/L 127 (L)    Potassium      3.5 - 5.1 mmol/L 3.9    Chloride      97 - 108 mmol/L 91 (L)    CO2      21 - 32 mmol/L 26    Anion gap      5 - 15 mmol/L 10    Glucose      65 - 100 mg/dL 694 (HH)    BUN      6 - 20 MG/DL 20    Creatinine      0.55 - 1.02 MG/DL 1.05 (H)    BUN/Creatinine ratio      12 - 20   19    GFR est AA      >60 ml/min/1.73m2 >60    GFR est non-AA      >60 ml/min/1.73m2 56 (L)    Calcium      8.5 - 10.1 MG/DL 8.6    Bilirubin, total      0.2 - 1.0 MG/DL 0.5    ALT (SGPT)      12 - 78 U/L 19    AST      15 - 37 U/L 13 (L)    Alk. phosphatase      45 - 117 U/L 51    Protein, total      6.4 - 8.2 g/dL 6.3 (L)    Albumin      3.5 - 5.0 g/dL 3.2 (L)    Globulin      2.0 - 4.0 g/dL 3.1    A-G Ratio      1.1 - 2.2   1.0 (L)    Color        YELLOW/STRAW   Appearance      CLEAR    CLEAR   Specific gravity      1.003 - 1.030    1.019   pH (UA)      5.0 - 8.0    6.0   Protein      NEG mg/dL  NEGATIVE   Glucose      NEG mg/dL  >1000 (A)   Ketone      NEG mg/dL  TRACE (A)   Bilirubin      NEG    NEGATIVE   Blood      NEG    NEGATIVE   Urobilinogen      0.2 - 1.0 EU/dL  0.2   Nitrites      NEG    NEGATIVE   Leukocyte Esterase      NEG    NEGATIVE   WBC      0 - 4 /hpf  0-4   RBC      0 - 5 /hpf  0-5   Epithelial cells      FEW /lpf  FEW   Bacteria      NEG /hpf  NEGATIVE   UA:UC IF INDICATED      CNI    CULTURE NOT INDICATED BY UA RESULT   Hyaline cast      0 - 5 /lpf  0-2     Component      Latest Ref Rng & Units 7/24/2017          12:42 AM   WBC      3.6 - 11.0 K/uL 6.6   RBC      3.80 - 5.20 M/uL 3.52 (L)   HGB      11.5 - 16.0 g/dL 12.5   HCT      35.0 - 47.0 % 34.6 (L)   MCV      80.0 - 99.0 FL 98.3   MCH      26.0 - 34.0 PG 35.5 (H)   MCHC      30.0 - 36.5 g/dL 36.1   RDW      11.5 - 14.5 % 12.6   PLATELET      354 - 482 K/uL 221   NEUTROPHILS      32 - 75 % 68   LYMPHOCYTES      12 - 49 % 22   MONOCYTES      5 - 13 % 9   EOSINOPHILS      0 - 7 % 1   BASOPHILS      0 - 1 % 0   ABS. NEUTROPHILS      1.8 - 8.0 K/UL 4.5   ABS. LYMPHOCYTES      0.8 - 3.5 K/UL 1.4   ABS. MONOCYTES      0.0 - 1.0 K/UL 0.6   ABS. EOSINOPHILS      0.0 - 0.4 K/UL 0.1   ABS.  BASOPHILS      0.0 - 0.1 K/UL 0.0 Assessment/Plan:   1. Other specified diabetes mellitus without complication, with long-term current use of insulin ([facility] [address]    1) Pt is not overweight, and the on-set of her hyperglycemia was after a viral infection. She has a hx of \"autoimmune issues\". These factors all lean towards likely \"Type I\", but will need to test to confirm this. Will check THADDEUS, ZN-Ab, insulin Ab, insulin level, proinsulin and c-peptide. We discussed at length DM, the various \"types\" and treatment options. For now will have pt continue the Lantus 12 units in the morning, till her labs come back. Will refer pt to the DM treatment center. Pt to check her BGs 5 times daily, since she has been having issues of hypoglycemia. RTC 6 weeks. Follow-up Disposition:  Return in 6 weeks (on 9/25/2017).     Copy sent to:  Dr. Yeimi Tomlin

## 2017-08-17 ENCOUNTER — TELEPHONE (OUTPATIENT)
Dept: DIABETES SERVICES | Age: 52
End: 2017-08-17

## 2017-08-22 LAB
C PEPTIDE SERPL-MCNC: 1.2 NG/ML (ref 1.1–4.4)
GAD65 AB SER IA-ACNC: <5 U/ML (ref 0–5)
INSULIN AB SER-ACNC: <5 UU/ML
INSULIN SERPL-ACNC: 4.2 UIU/ML (ref 2.6–24.9)
ISLET CELL512 AB SER-ACNC: <1 U/ML
PROINSULIN SERPL-SCNC: 1.1 PMOL/L (ref 0–10)
ZNT8 AB: 66 U/ML

## 2017-08-23 NOTE — PROGRESS NOTES
Her insulin, proinsulin and c-peptide were low normal and one of her 4 antibodies were positive. The raises the concern of Autoimmune DM and he in the \"honeymoon phase\". For now will continue the Lantus and see what her BGs do.

## 2017-08-31 ENCOUNTER — HOSPITAL ENCOUNTER (OUTPATIENT)
Dept: DIABETES SERVICES | Age: 52
Discharge: HOME | End: 2017-08-31
Payer: COMMERCIAL

## 2017-08-31 PROCEDURE — G0108 DIAB MANAGE TRN  PER INDIV: HCPCS

## 2017-09-01 NOTE — DIABETES MGMT
DTC Progress Note    Recommendations/ Comments:   1) decreasing pt's Lantus to 11 units q am.  2) pt will likely need to start prandial insulin earlier than anticipated given that BG will increase to > 250 mg/dl post prandial.  3) Pt was instructed to forward her food diaries to educator for review. 4) instructed pt to complete a typical intake of her diet prior to her dx to better assess her CHO intakes and therefore, potential for starting. prandial insulin (see below for details). 5) at present until pt's TDD of insulin can be better assessed, she will be wasting large quantities of insulin in pod q 3 day should she pursue this avenue within the next few weeks-month. Chart reviewed on Kulwant Jaron and visited with her and her significant other today. Patient is a 54 y.o. female with onset of NII started on Lantus at 12 units and is still experiencing hypoglycemia. She is on no other medication at this time for glycemic management. Pt is currently using a Accuchek Compact Plus for monitoring BG q 3-4 times a day and sharing with Endo. Pt shared that she is aware of Type 1 diabetes - shared that \"this sucks\" but I moving towards accepting and implementing treatment options. She also shared that she wanted to go straight to using pods versus starting MDI therapy. She shared that the reason she was really interested in pods was because she has a student that wears one and that her significant other's son has type 1 and is using pods. Provided her with pump comparison sheet and discussed the current trends in DM technology and \"where it is moving to in the near future\". Most importantly, we discussed how little insulin she is currently using and with the minimum fill requirement with the Omnipod that could mean wasting between 20-40 units of insulin every pod change.  Neither were aware of this limitation with the system, as I shared with pt that given we do not know her needs for prandial coverage this may be a null issue, but in the meantime, starting mealtime injections might need to be considered. Shared pros and cons with all systems and also encouraged pt to consider using CGM in the meantime as option to be more capable of making insightful decisions about her BG management while using prandial injection is a potential option until her TDD is available. Also reinforced with pt that once she is making a decision about insulin pump, she will not be able to have insurance coverage for \"different company\" switch until the warranty on the pump has lapsed (4 year commitment) without paying out of pocket. Related to prandial injections, both shared that pt has a very \"healthy\" diet. Per her recall with  and what she reports with me today, her po intake of CHO is very restricted to <50g per day and <50g per day is not her normal intakes. Question if hypoglycemia may be related to depleted glycogen stores as she is not consuming adequated CHO, having repetitive lows and is exercising (not to her typical levels but still >250 minutes per week at present). She is usually consuming sweet potatoes, 3 pieces of fruit each day, dairy along with other starch vegetable - but, she is severely restricting all of the nutrient dense, healthful, low kcal food options to control her prandial responses (with exception of very small portion of birthday cake which resulted in very undesirable side effects of severe headache and fatigue along with >250 mg/dl BG result). Pt shared that she is being evaluated for food allergies and she tends to limit her intakes of meat- therefore, does not have much food options at present. \"I am running out of ideas to feed myself\" and being able to enjoy her healthy food choices again. Pt is exercising at a decreased level that her usual routine due to BG and her hypoglycemia risk.   Discussed options for safe exercise, planning for CHO prior to exercise depending on duration and when BG is \"too high\" to be able to exercise. Weekly when she was feeling good:  yardwork for 60 minutes weekly, 60 minutes of exercise at the gym or running 5/7 days a week. She is currently at 20-30 minutes 2-3 times per week and yardwork for 60 minutes. We addressed treating of hypoglycemia, reinforced rechecking BG 15 minutes post treatment and not to wait 60 minutes to assess if BG have responded to treatment. Discussed injection site options compared to infusion site/sensor.         Thank you  Vincent Llanos RD, CDE

## 2017-09-08 ENCOUNTER — TELEPHONE (OUTPATIENT)
Dept: ENDOCRINOLOGY | Age: 52
End: 2017-09-08

## 2017-09-08 NOTE — TELEPHONE ENCOUNTER
----- Message from Chapito Maldonado RD sent at 8/31/2017  1:29 PM EDT -----  Regarding: Insulin questions     Met with pt today and she is having hypoglycemia fasting. She has decreased Lantus to 12 units and is taking it in am as instructed. Are you okay if she decreases her Lantus to 11 units tomorrow morning? Secondly, it looks based on her labs, BG responses that she is going to need prandial insulin. By the time we finished talking today, she was agreeable to starting prandial via injections as the pod requires minimum fill of 80 units and we don't know if she is going to even meet this minimum. She is consuming <50g CHO day. For the BG levels that are on her attached logs sent via [facility] email. Would consider starting 2 units ac. She has been instructed to email copies of her current food diary without carbs and to give me a typical intake. She seems to be quite sensitive to insulin at this point. Will have full note in [address] [facility] by end of day. We will be meeting again 2-3 weeks to education on insulin to carb ratio and correction.     Lory George

## 2017-09-25 ENCOUNTER — OFFICE VISIT (OUTPATIENT)
Dept: ENDOCRINOLOGY | Age: 52
End: 2017-09-25

## 2017-09-25 VITALS
SYSTOLIC BLOOD PRESSURE: 106 MMHG | WEIGHT: 129.8 LBS | HEIGHT: 65 IN | BODY MASS INDEX: 21.63 KG/M2 | HEART RATE: 69 BPM | DIASTOLIC BLOOD PRESSURE: 72 MMHG

## 2017-09-25 DIAGNOSIS — E13.9 OTHER SPECIFIED DIABETES MELLITUS WITHOUT COMPLICATION, WITH LONG-TERM CURRENT USE OF INSULIN (HCC): Primary | ICD-10-CM

## 2017-09-25 DIAGNOSIS — Z79.4 OTHER SPECIFIED DIABETES MELLITUS WITHOUT COMPLICATION, WITH LONG-TERM CURRENT USE OF INSULIN (HCC): Primary | ICD-10-CM

## 2017-09-25 PROBLEM — E11.9 DIABETES MELLITUS WITHOUT COMPLICATION, WITH LONG-TERM CURRENT USE OF INSULIN (HCC): Status: ACTIVE | Noted: 2017-09-25

## 2017-09-25 NOTE — PROGRESS NOTES
Chief Complaint   Patient presents with    Diabetes     pcp and pharmacy verified. Release signed for eye exam   Records since last visit reviewed  History of Present Illness: Lisa Hudson is a 54 y.o. female here for follow up of diabetes. Pt notes that over [address] day weekend (2017)  she \"came down with a Virus and sinus infection\" She was put on Abx and she noted polyuria, polydipsia and blurred vision. She had also lost 10 pounds in a short period of time. She saw her eye doctor who noted swelling in her cornia (will request these records). The swelling improved but did not normalize, her eye doctor recommended she get labs drawn and her PCP cecilia labs, her BG was 300 and her PCP started her on Glipizide. She continued to have symptoms so she checked her BG on a glucometer said \"high\" so she went to the ED and her BG >600. Her A1C was 13.1%. AB testing showed positive [place] and CRP. Her PCP called and he was recommended by Dr. David Becker to start Lantus 18 units daily. She notes her BGs did improve some, but she began to have low BGs so she decreased her dose to 15 units. She continued to have low BGs and she moved her Lantus to the Morning and decreased to 10 units. She notes that on the lower dose of Lantus 10 units she will have a lower BG by noon. She feels that the 12 units did better overall however. At our initial visit in August 2017 I tested her for autoimmune DM   Her insulin, proinsulin and c-peptide were low normal and one of her 4 antibodies were positive. (ZN-8). This fits with Auto-immune DM Type 1/NII. Pt was continuing to have low BGs with Lantus 12 units daily only so I recommended she decrease to 11 units of Lantus daily, and finally to 10 units daily. She has been meeting with Ms Lilly Lawler of [facility] and she is in agreement to start prandial insulin. Pt brought in her BG logs with her today. Pt is taking Lantus 10 units daily.   Her FBGs have been in the 's range, with pre-lunch in the 's, pre dinner in the 's and HS in the 's. Her BGs continue to decrease overnight. Exercise consists of treadmill/cardio for 30 minutes in the morning. If her BGs are under 70 she does not work out in the morning. She also has lots of house and yard work. No history of vascular disease. No history of retinopathy, neuropathy, or nephropathy. Last eye exam was June 2017. Current Outpatient Prescriptions   Medication Sig    LANTUS SOLOSTAR 100 unit/mL (3 mL) inpn 10 units in AM    lancets (BD ULTRA FINE LANCETS) 33 gauge misc Check blood sugar 5 times per day    norgestimate-ethinyl estradiol (ORTHO TRI-CYCLEN LO) 0.18/0.215/0.25 mg-25 mcg tab     multivitamin (ONE A DAY) tablet Take 1 Tab by mouth daily. No current facility-administered medications for this visit. Allergies   Allergen Reactions    Tree Nut Anaphylaxis     Not true allergy. Peanut sensitivity via allergy testing     Review of Systems:  - Eyes: no blurry vision or double vision  - Cardiovascular: no chest pain  - Respiratory: no shortness of breath  - Musculoskeletal: no myalgias  - Neurological: no numbness/tingling in extremities    Physical Examination:  Blood pressure 106/72, pulse 69, height 5' 5\" (1.651 m), weight 129 lb 12.8 oz (58.9 kg). - General: pleasant, no distress, good eye contact   - Neck: no carotid bruits  - Cardiovascular: regular, normal rate, nl s1 and s2, no m/r/g, 2+ DP pulses   - Respiratory: clear bilaterally  - Integumentary: no edema, no foot ulcers, sensation to monofilament and vibration intact bilaterally  - Psychiatric: normal mood and affect    Data Reviewed:   - none new for review    Assessment/Plan:   1) DM > Pt continues to have low BGs on Lantus 10 units daily. Will have pt decrease her dose to 8 units daily and continue to check her BGs AC/HS.   If we are able to eliminate the fasting low BGs, but she has higher BGs during the day we will need to start meal time humalog. Pt voices understanding and agreement with the plan. RTC 4 weeks. Follow-up Disposition:  Return in about 4 weeks (around 10/23/2017).     Copy sent to:  Dr. Dolores Armando

## 2017-10-16 ENCOUNTER — OFFICE VISIT (OUTPATIENT)
Dept: PRIMARY CARE CLINIC | Age: 52
End: 2017-10-16

## 2017-10-16 VITALS
SYSTOLIC BLOOD PRESSURE: 100 MMHG | HEIGHT: 65 IN | BODY MASS INDEX: 21.66 KG/M2 | HEART RATE: 71 BPM | WEIGHT: 130 LBS | TEMPERATURE: 97.7 F | RESPIRATION RATE: 18 BRPM | OXYGEN SATURATION: 98 % | DIASTOLIC BLOOD PRESSURE: 72 MMHG

## 2017-10-16 DIAGNOSIS — B37.9 ANTIBIOTIC-INDUCED YEAST INFECTION: ICD-10-CM

## 2017-10-16 DIAGNOSIS — H65.92 OTITIS MEDIA WITH EFFUSION, LEFT: ICD-10-CM

## 2017-10-16 DIAGNOSIS — J01.10 ACUTE FRONTAL SINUSITIS, RECURRENCE NOT SPECIFIED: Primary | ICD-10-CM

## 2017-10-16 DIAGNOSIS — J01.00 ACUTE MAXILLARY SINUSITIS, RECURRENCE NOT SPECIFIED: ICD-10-CM

## 2017-10-16 DIAGNOSIS — T36.95XA ANTIBIOTIC-INDUCED YEAST INFECTION: ICD-10-CM

## 2017-10-16 RX ORDER — FLUCONAZOLE 150 MG/1
TABLET ORAL
Qty: 2 TAB | Refills: 0 | Status: SHIPPED | OUTPATIENT
Start: 2017-10-16 | End: 2018-01-29

## 2017-10-16 RX ORDER — AMOXICILLIN AND CLAVULANATE POTASSIUM 875; 125 MG/1; MG/1
1 TABLET, FILM COATED ORAL EVERY 12 HOURS
Qty: 20 TAB | Refills: 0 | Status: SHIPPED | OUTPATIENT
Start: 2017-10-16 | End: 2017-10-26

## 2017-10-16 RX ORDER — LANCETS
EACH MISCELLANEOUS
Refills: 11 | COMMUNITY
Start: 2017-09-27 | End: 2018-01-18 | Stop reason: CLARIF

## 2017-10-16 RX ORDER — PEN NEEDLE, DIABETIC 32GX 5/32"
NEEDLE, DISPOSABLE MISCELLANEOUS
COMMUNITY
Start: 2017-10-15 | End: 2018-08-27 | Stop reason: SDUPTHER

## 2017-10-16 NOTE — PROGRESS NOTES
Chief Complaint   Patient presents with    Ear Pain     left ear for 3 weeks, worse last night, used heating pad, Advil and Ibuprofen

## 2017-10-16 NOTE — MR AVS SNAPSHOT
Visit Information Date & Time Provider Department Dept. Phone Encounter #  
 10/16/2017  8:30 AM Ruby Castellon NP [address] [facility] 6954 [phone] [id number] Follow-up Instructions Return if symptoms worsen or fail to improve. Upcoming Health Maintenance Date Due  
 EYE EXAM RETINAL OR DILATED Q1 12/26/1977 Pneumococcal 19-64 Medium Risk (1 of 1 - PPSV23) 12/26/1986 DTaP/Tdap/Td series (1 - Tdap) 12/26/1988 PAP AKA CERVICAL CYTOLOGY 12/26/1988 HEMOGLOBIN A1C Q6M 1/20/2018 MICROALBUMIN Q1 7/20/2018 LIPID PANEL Q1 7/20/2018 FOOT EXAM Q1 9/25/2018 Allergies as of 10/16/2017  Review Complete On: 10/16/2017 By: Enrike Galvez LPN Severity Noted Reaction Type Reactions Tree Nut High 07/23/2017    Anaphylaxis Not true allergy. Peanut sensitivity via allergy testing Current Immunizations  Never Reviewed No immunizations on file. Not reviewed this visit You Were Diagnosed With   
  
 Codes Comments Acute frontal sinusitis, recurrence not specified    -  Primary ICD-10-CM: J01.10 ICD-9-CM: 052.0 Acute maxillary sinusitis, recurrence not specified     ICD-10-CM: J01.00 ICD-9-CM: 461.0 Otitis media with effusion, left     ICD-10-CM: H65.92 
ICD-9-CM: 381. 4 Antibiotic-induced yeast infection     ICD-10-CM: B37.9, T36.95XA ICD-9-CM: 112.9, E930.9 Vitals BP Pulse Temp Resp Height(growth percentile) Weight(growth percentile) 100/72 (BP 1 Location: Left arm, BP Patient Position: Sitting) 71 97.7 °F (36.5 °C) (Oral) 18 5' 5\" (1.651 m) 130 lb (59 kg) LMP SpO2 BMI OB Status Smoking Status 10/02/2017 98% 21.63 kg/m2 Having regular periods Never Smoker Vitals History BMI and BSA Data Body Mass Index Body Surface Area  
 21.63 kg/m 2 1.64 m 2 Preferred Pharmacy Pharmacy Name Phone  [id number], [place] AT [facility] [phone] Your Updated Medication List  
  
   
This list is accurate as of: 10/16/17  9:10 AM.  Always use your most recent med list.  
  
  
  
  
 ACCU-CHEK COMPACT PLUS TEST Strp Generic drug:  Blood Sugar Diagnostic, Drum USE 1 STRIP TO TEST BLOOD SUGAR UP TO FOUR TIMES DAILY  
  
 amoxicillin-clavulanate 875-125 mg per tablet Commonly known as:  AUGMENTIN Take 1 Tab by mouth every twelve (12) hours for 10 days. fluconazole 150 mg tablet Commonly known as:  DIFLUCAN Take one tab by mouth daily for one dose, repeat in 3 days if needed  
  
 lancets 33 gauge Misc Commonly known as:  BD ULTRA FINE LANCETS Check blood sugar 5 times per day LANTUS SOLOSTAR 100 unit/mL (3 mL) Inpn Generic drug:  insulin glargine 10 units in AM  
  
 multivitamin tablet Commonly known as:  ONE A DAY Take 1 Tab by mouth daily. Gauri Pen Needle 32 gauge x 5/32\" Ndle Generic drug:  Insulin Needles (Disposable)  
  
 norgestimate-ethinyl estradiol 0.18/0.215/0.25 mg-25 mcg Tab Commonly known as:  ORTHO TRI-CYCLEN LO  
  
  
  
  
Prescriptions Sent to Pharmacy Refills  
 amoxicillin-clavulanate (AUGMENTIN) 875-125 mg per tablet 0 Sig: Take 1 Tab by mouth every twelve (12) hours for 10 days. Class: Normal  
 Pharmacy: [address] Ph #: [phone] Route: Oral  
 fluconazole (DIFLUCAN) 150 mg tablet 0 Sig: Take one tab by mouth daily for one dose, repeat in 3 days if needed Class: Normal  
 Pharmacy: [address] Ph #: [phone] Follow-up Instructions Return if symptoms worsen or fail to improve. To-Do List   
 10/26/2017 8:30 AM  
  Appointment with [facility] at [address] [place] [address] ([phone]) Shower or bathe using soap and water. Do not use deodorant, powder, perfumes, or lotion the day of your exam.  If your prior mammograms were not performed at [facility] please bring films with you or forward prior images 2 days before your procedure. If patient is not a callback diagnostic, the patient must have an order/script from the physician for the diagnostic. Please bring it on the day of the mammogram or have it faxed to the department. [facility]  [phone] [facility] [phone] [address] [phone] [facility] [phone] [facility] [phone] [facility] [phone]  
  
 10/26/2017 9:00 AM  
  Appointment with [facility] at [place] [facility] Ul[facility] ([phone]) Shower or bathe using soap and water. Do not use deodorant, powder, perfumes, or lotion. If your prior films were not performed at a local [facility] facility please bring or forward prior images 2 days before procedure. Patient Instructions Sinusitis: Care Instructions Your Care Instructions Sinusitis is an infection of the lining of the sinus cavities in your head. Sinusitis often follows a cold. It causes pain and pressure in your head and face. In most cases, sinusitis gets better on its own in 1 to 2 weeks. But some mild symptoms may last for several weeks. Sometimes antibiotics are needed. Follow-up care is a key part of your treatment and safety. Be sure to make and go to all appointments, and call your doctor if you are having problems. It's also a good idea to know your test results and keep a list of the medicines you take. How can you care for yourself at home? · Take an over-the-counter pain medicine, such as acetaminophen (Tylenol), ibuprofen (Advil, Motrin), or naproxen (Aleve). Read and follow all instructions on the label. · If the doctor prescribed antibiotics, take them as directed. Do not stop taking them just because you feel better. You need to take the full course of antibiotics. · Be careful when taking over-the-counter cold or flu medicines and Tylenol at the same time. Many of these medicines have acetaminophen, which is Tylenol. Read the labels to make sure that you are not taking more than the recommended dose. Too much acetaminophen (Tylenol) can be harmful. · Breathe warm, moist air from a steamy shower, a hot bath, or a sink filled with hot water. Avoid cold, dry air. Using a humidifier in your home may help. Follow the directions for cleaning the machine. · Use saline (saltwater) nasal washes to help keep your nasal passages open and wash out mucus and bacteria. You can buy saline nose drops at a grocery store or drugstore. Or you can make your own at home by adding 1 teaspoon of salt and 1 teaspoon of baking soda to 2 cups of distilled water. If you make your own, fill a bulb syringe with the solution, insert the tip into your nostril, and squeeze gently. Teola Estefanía your nose. · Put a hot, wet towel or a warm gel pack on your face 3 or 4 times a day for 5 to 10 minutes each time. · Try a decongestant nasal spray like oxymetazoline (Afrin). Do not use it for more than 3 days in a row. Using it for more than 3 days can make your congestion worse. When should you call for help? Call your doctor now or seek immediate medical care if: 
· You have new or worse swelling or redness in your face or around your eyes. · You have a new or higher fever. Watch closely for changes in your health, and be sure to contact your doctor if: 
· You have new or worse facial pain. · The mucus from your nose becomes thicker (like pus) or has new blood in it. · You are not getting better as expected. Where can you learn more? Go to http://padmaja-tiffany.info/. Enter O785 in the search box to learn more about \"Sinusitis: Care Instructions. \" Current as of: July 29, 2016 Content Version: 11.3 © 3948-8470 [employer], [employer].  Care instructions adapted under license by [address] (which disclaims liability or warranty for this information). If you have questions about a medical condition or this instruction, always ask your healthcare professional. [address] any warranty or liability for your use of this information. Introducing [facility]! Dear Naz Michele: Thank you for requesting a [employer] account. Our records indicate that you already have an active [employer] account. You can access your account anytime at [email or web address] Did you know that you can access your hospital and ER discharge instructions at any time in [employer]? You can also review all of your test results from your hospital stay or ER visit. Additional Information If you have questions, please visit the Frequently Asked Questions section of the [employer] website at [email or web address] Remember, [employer] is NOT to be used for urgent needs. For medical emergencies, dial 911. Now available from your iPhone and Android! Please provide this summary of care documentation to your next provider. Your primary care clinician is listed as Adrienne Willett. If you have any questions after today's visit, please call [phone].

## 2017-10-16 NOTE — PROGRESS NOTES
Doyle Brar is a 54 y.o. female. HPI    ROS    Physical Exam      Subjective:   Doyle Brar is a 54 y.o. female who complains of right ear pain for 1-2 days, gradually worsening since that time. Pt reports ongoing sinus congestion/pressure, headache, and drainage for ~ 3 weeks. Then she developed bilateral ear pain (L > R) with gradual worsening. Ibuprofen 800mg helps to minimize the pain. Denies any fevers or chills. Pt was recently diagnosed as Type I DM (autoimmune) and is on insulin. She denies a history of shortness of breath and wheezing. Evaluation to date: none. Treatment to date: OTC products - Sudafed, Flonase, Zyrtec. Patient does not smoke cigarettes. Relevant PMH:   Past Medical History:   Diagnosis Date    Diabetes ([facility] [address] 2017    Type 1 diabetes     Past Surgical History:   Procedure Laterality Date    HX  SECTION Bilateral     IMPLANT BREAST SILICONE/EQ Bilateral     2008     Allergies   Allergen Reactions    Tree Nut Anaphylaxis     Not true allergy. Peanut sensitivity via allergy testing     PCP - Dr. Scotty Rucker      Review of Systems  Pertinent items are noted in HPI. Objective:     Visit Vitals    /72 (BP 1 Location: Left arm, BP Patient Position: Sitting)    Pulse 71    Temp 97.7 °F (36.5 °C) (Oral)    Resp 18    Ht 5' 5\" (1.651 m)    Wt 130 lb (59 kg)    LMP 10/02/2017    SpO2 98%    BMI 21.63 kg/m2     General:  alert, cooperative, no distress   Eyes: negative   Ears: Right TM normal; Left TM with fluid and bubbles   Sinuses: tenderness over bilateral maxillary and frontal   Mouth:  Lips, mucosa, and tongue normal. Teeth and gums normal and abnormal findings: mild oropharyngeal erythema and PND. Neck: supple, symmetrical, trachea midline and no adenopathy. Heart: S1 and S2 normal, no murmurs noted. Lungs: clear to auscultation bilaterally        Assessment/Plan:       ICD-10-CM ICD-9-CM    1.  Acute frontal sinusitis, recurrence not specified J01.10 461.1    2. Acute maxillary sinusitis, recurrence not specified J01.00 461.0    3. Otitis media with effusion, left H65.92 381.4    4. Antibiotic-induced yeast infection B37.9 112.9     T36.95XA E930.9      Orders Placed This Encounter    amoxicillin-clavulanate (AUGMENTIN) 875-125 mg per tablet    fluconazole (DIFLUCAN) 150 mg tablet     Discussed the dx and tx of sinusitis. Suggested symptomatic OTC remedies. Nasal saline sprays for congestion. Antibiotics per orders. RTC prn. Jacek Mccullough NP  This note will not be viewable in [address]

## 2017-10-16 NOTE — PROGRESS NOTES
Subjective:   Maria Esther Simpson is a 54 y.o. female who complains of right ear pain for 1-2 days, gradually worsening since that time. Pt reports ongoing sinus congestion/pressure, headache, and drainage for ~ 3 weeks. Then she developed bilateral ear pain (L > R) with gradual worsening. Ibuprofen 800mg helps to minimize the pain. Denies any fevers or chills. Pt was recently diagnosed as Type I DM (autoimmune) and is on insulin. She denies a history of shortness of breath and wheezing. Evaluation to date: none. Treatment to date: OTC products - Sudafed, Flonase, Zyrtec. Patient does not smoke cigarettes. Relevant PMH:   Past Medical History:   Diagnosis Date    Diabetes ([facility] [address] 2017    Type 1 diabetes     Past Surgical History:   Procedure Laterality Date    HX  SECTION Bilateral     IMPLANT BREAST SILICONE/EQ Bilateral     2008     Allergies   Allergen Reactions    Tree Nut Anaphylaxis     Not true allergy. Peanut sensitivity via allergy testing     PCP - Dr. Washington Bradley      Review of Systems  Pertinent items are noted in HPI. Objective:     Visit Vitals    /72 (BP 1 Location: Left arm, BP Patient Position: Sitting)    Pulse 71    Temp 97.7 °F (36.5 °C) (Oral)    Resp 18    Ht 5' 5\" (1.651 m)    Wt 130 lb (59 kg)    LMP 10/02/2017    SpO2 98%    BMI 21.63 kg/m2     General:  alert, cooperative, no distress   Eyes: negative   Ears: Right TM normal; Left TM with fluid and bubbles   Sinuses: tenderness over bilateral maxillary and frontal   Mouth:  Lips, mucosa, and tongue normal. Teeth and gums normal and abnormal findings: mild oropharyngeal erythema and PND. Neck: supple, symmetrical, trachea midline and no adenopathy. Heart: S1 and S2 normal, no murmurs noted. Lungs: clear to auscultation bilaterally        Assessment/Plan:       ICD-10-CM ICD-9-CM    1. Acute frontal sinusitis, recurrence not specified J01.10 461.1    2.  Acute maxillary sinusitis, recurrence not specified J01.00 461.0    3. Otitis media with effusion, left H65.92 381.4    4. Antibiotic-induced yeast infection B37.9 112.9     T36.95XA E930.9      Orders Placed This Encounter    amoxicillin-clavulanate (AUGMENTIN) 875-125 mg per tablet    fluconazole (DIFLUCAN) 150 mg tablet     Discussed the dx and tx of sinusitis. Suggested symptomatic OTC remedies. Nasal saline sprays for congestion. Antibiotics per orders. RTC prn. Joanette Opitz, NP  This note will not be viewable in [address]

## 2017-10-16 NOTE — PATIENT INSTRUCTIONS
Sinusitis: Care Instructions  Your Care Instructions    Sinusitis is an infection of the lining of the sinus cavities in your head. Sinusitis often follows a cold. It causes pain and pressure in your head and face. In most cases, sinusitis gets better on its own in 1 to 2 weeks. But some mild symptoms may last for several weeks. Sometimes antibiotics are needed. Follow-up care is a key part of your treatment and safety. Be sure to make and go to all appointments, and call your doctor if you are having problems. It's also a good idea to know your test results and keep a list of the medicines you take. How can you care for yourself at home? · Take an over-the-counter pain medicine, such as acetaminophen (Tylenol), ibuprofen (Advil, Motrin), or naproxen (Aleve). Read and follow all instructions on the label. · If the doctor prescribed antibiotics, take them as directed. Do not stop taking them just because you feel better. You need to take the full course of antibiotics. · Be careful when taking over-the-counter cold or flu medicines and Tylenol at the same time. Many of these medicines have acetaminophen, which is Tylenol. Read the labels to make sure that you are not taking more than the recommended dose. Too much acetaminophen (Tylenol) can be harmful. · Breathe warm, moist air from a steamy shower, a hot bath, or a sink filled with hot water. Avoid cold, dry air. Using a humidifier in your home may help. Follow the directions for cleaning the machine. · Use saline (saltwater) nasal washes to help keep your nasal passages open and wash out mucus and bacteria. You can buy saline nose drops at a grocery store or drugstore. Or you can make your own at home by adding 1 teaspoon of salt and 1 teaspoon of baking soda to 2 cups of distilled water. If you make your own, fill a bulb syringe with the solution, insert the tip into your nostril, and squeeze gently. Nyoka Mano your nose.   · Put a hot, wet towel or a warm gel pack on your face 3 or 4 times a day for 5 to 10 minutes each time. · Try a decongestant nasal spray like oxymetazoline (Afrin). Do not use it for more than 3 days in a row. Using it for more than 3 days can make your congestion worse. When should you call for help? Call your doctor now or seek immediate medical care if:  · You have new or worse swelling or redness in your face or around your eyes. · You have a new or higher fever. Watch closely for changes in your health, and be sure to contact your doctor if:  · You have new or worse facial pain. · The mucus from your nose becomes thicker (like pus) or has new blood in it. · You are not getting better as expected. Where can you learn more? Go to http://padmaja-tiffany.info/. Enter D054 in the search box to learn more about \"Sinusitis: Care Instructions. \"  Current as of: July 29, 2016  Content Version: 11.3  © 7595-4082 [employer]. Care instructions adapted under license by [employer] (which disclaims liability or warranty for this information). If you have questions about a medical condition or this instruction, always ask your healthcare professional. [place] [address] any warranty or liability for your use of this information.

## 2017-10-23 ENCOUNTER — OFFICE VISIT (OUTPATIENT)
Dept: ENDOCRINOLOGY | Age: 52
End: 2017-10-23

## 2017-10-23 VITALS
DIASTOLIC BLOOD PRESSURE: 73 MMHG | WEIGHT: 130.4 LBS | HEIGHT: 65 IN | SYSTOLIC BLOOD PRESSURE: 103 MMHG | BODY MASS INDEX: 21.73 KG/M2 | HEART RATE: 71 BPM

## 2017-10-23 DIAGNOSIS — E10.9 TYPE 1 DIABETES MELLITUS WITHOUT COMPLICATION (HCC): Primary | ICD-10-CM

## 2017-10-23 RX ORDER — INSULIN LISPRO 100 [IU]/ML
INJECTION, SOLUTION INTRAVENOUS; SUBCUTANEOUS
Qty: 1 PACKAGE | Refills: 0 | Status: SHIPPED | COMMUNITY
Start: 2017-10-23 | End: 2017-11-09 | Stop reason: SDUPTHER

## 2017-10-23 NOTE — PROGRESS NOTES
Chief Complaint   Patient presents with    Diabetes     pcp and pharmacy verified   Records since last visit reviewed  History of Present Illness: Lorna Hernandes is a 54 y.o. female here for follow up of diabetes. Pt notes that over [facility] day weekend (2017)  she \"came down with a Virus and sinus infection\" She was put on Abx and she noted polyuria, polydipsia and blurred vision. She had also lost 10 pounds in a short period of time. She saw her eye doctor who noted swelling in her cornia (will request these records). The swelling improved but did not normalize, her eye doctor recommended she get labs drawn and her PCP cecilia labs, her BG was 300 and her PCP started her on Glipizide. She continued to have symptoms so she checked her BG on a glucometer said \"high\" so she went to the ED and her BG >600. Her A1C was 13.1%. AB testing showed positive FRANCE and CRP. Her PCP called and he was recommended by Dr. Alexandrea Torres to start Lantus 18 units daily. She notes her BGs did improve some, but she began to have low BGs so she decreased her dose to 15 units. She continued to have low BGs and she moved her Lantus to the Morning and decreased to 10 units. She notes that on the lower dose of Lantus 10 units she will have a lower BG by noon. She feels that the 12 units did better overall however. At our initial visit in August 2017 I tested her for autoimmune DM   Her insulin, proinsulin and c-peptide were low normal and one of her 4 antibodies were positive. (ZN-8). This fits with Auto-immune DM Type 1/NII. At our last visit in September 2017 she was taking Lantus 10 units daily and was still having low BGs. I instructed her to decrease her Lantus to 8 units daily. She brought her BGs logs with her today. Her FBGs have been in the  with a couple of BGs in the 60's. Her pre-lunch BGs have been in the 's with pre-dinner in the 's and HS in the 's.   She has had low BGs in the morning, particularly on busy days. She notes she has been \"experimenting\" with various foods to see how they effected her sugars, consequently she has had a couple of BGs in the 200's, but she is learning how various foods effect her BGs. She is taking Lantus 8 units every morning. Her weight is stable today. She has been meeting with Ms Frida Gabino of [employer] and she is in agreement to start prandial insulin. Pt brought in her BG logs with her today. Pt is taking Lantus 10 units daily. Exercise consists of treadmill/cardio for 30 minutes in the morning. If her BGs are under 80 she does not work out in the morning, because she notes that if she works out her BGs will drop 40 points. She also has lots of house and yard work. No history of vascular disease. No history of retinopathy, neuropathy, or nephropathy. Last eye exam was June 2017. Current Outpatient Prescriptions   Medication Sig    insulin lispro (HUMALOG) 100 unit/mL kwikpen AD    ACCU-CHEK COMPACT PLUS TEST strp USE 1 STRIP TO TEST BLOOD SUGAR UP TO FOUR TIMES DAILY    amoxicillin-clavulanate (AUGMENTIN) 875-125 mg per tablet Take 1 Tab by mouth every twelve (12) hours for 10 days.  fluconazole (DIFLUCAN) 150 mg tablet Take one tab by mouth daily for one dose, repeat in 3 days if needed    LANTUS SOLOSTAR 100 unit/mL (3 mL) inpn 8  units in AM    lancets (BD ULTRA FINE LANCETS) 33 gauge misc Check blood sugar 5 times per day    norgestimate-ethinyl estradiol (ORTHO TRI-CYCLEN LO) 0.18/0.215/0.25 mg-25 mcg tab     multivitamin (ONE A DAY) tablet Take 1 Tab by mouth daily.  HYUN PEN NEEDLE 32 gauge x 5/32\" ndle      No current facility-administered medications for this visit. Allergies   Allergen Reactions    Tree Nut Anaphylaxis     Not true allergy.  Peanut sensitivity via allergy testing     Review of Systems:  - Eyes: no blurry vision or double vision  - Cardiovascular: no chest pain  - Respiratory: no shortness of breath  - Musculoskeletal: no myalgias  - Neurological: no numbness/tingling in extremities    Physical Examination:  Blood pressure 103/73, pulse 71, height 5' 5\" (1.651 m), weight 130 lb 6.4 oz (59.1 kg), last menstrual period 10/02/2017.  - General: pleasant, no distress, good eye contact   - Neck: no carotid bruits  - Cardiovascular: regular, normal rate, nl s1 and s2, no m/r/g, 2+ DP pulses   - Respiratory: clear bilaterally  - Integumentary: no edema, no foot ulcers, sensation to monofilament and vibration intact bilaterally  - Psychiatric: normal mood and affect    Data Reviewed:   - none new for review    Assessment/Plan:   1) DM > Pt continues to have low BGs on Lantus 8 units daily, but is less frequently. Will have her decrease her Lantus to 6 units daily. If we are able to eliminate the fasting low BGs, but she has higher BGs during the day we will need to start meal time humalog. Pt given a Humalog pen to take with her higher carb meals. Pt to check her BGs AC/HS and mail her BG logs to me in 3 weeks. Pt voices understanding and agreement with the plan. RTC 3 months       Follow-up Disposition:  Return in about 3 months (around 1/23/2018).     Copy sent to:  Dr. Olive Gamble

## 2017-10-23 NOTE — PATIENT INSTRUCTIONS
1) Decrease your Lantus to 6 units every morning. 2) If you have a higher carb meal take 2 units of Humalog. 3) Mail me some blood sugar logs in 3 weeks.

## 2017-10-23 NOTE — MR AVS SNAPSHOT
Visit Information Date & Time Provider Department Dept. Phone Encounter #  
 10/23/2017 10:20 AM Sarina Banerjee MD [facility] [id number] Upcoming Health Maintenance Date Due  
 EYE EXAM RETINAL OR DILATED Q1 12/26/1977 Pneumococcal 19-64 Medium Risk (1 of 1 - PPSV23) 12/26/1986 DTaP/Tdap/Td series (1 - Tdap) 12/26/1988 PAP AKA CERVICAL CYTOLOGY 12/26/1988 HEMOGLOBIN A1C Q6M 1/20/2018 MICROALBUMIN Q1 7/20/2018 LIPID PANEL Q1 7/20/2018 FOOT EXAM Q1 9/25/2018 Allergies as of 10/23/2017  Review Complete On: 10/23/2017 By: Sarina Banerjee MD  
  
 Severity Noted Reaction Type Reactions Tree Nut High 07/23/2017    Anaphylaxis Not true allergy. Peanut sensitivity via allergy testing Current Immunizations  Never Reviewed No immunizations on file. Not reviewed this visit Vitals BP Pulse Height(growth percentile) Weight(growth percentile) LMP BMI  
 103/73 71 5' 5\" (1.651 m) 130 lb 6.4 oz (59.1 kg) 10/02/2017 21.7 kg/m2 OB Status Smoking Status Having regular periods Never Smoker Vitals History BMI and BSA Data Body Mass Index Body Surface Area 21.7 kg/m 2 1.65 m 2 Preferred Pharmacy Pharmacy Name Phone [phone] [facility] [phone] Your Updated Medication List  
  
   
This list is accurate as of: 10/23/17 10:34 AM.  Always use your most recent med list.  
  
  
  
  
 ACCU-CHEK COMPACT PLUS TEST Strp Generic drug:  Blood Sugar Diagnostic, Drum USE 1 STRIP TO TEST BLOOD SUGAR UP TO FOUR TIMES DAILY  
  
 amoxicillin-clavulanate 875-125 mg per tablet Commonly known as:  AUGMENTIN Take 1 Tab by mouth every twelve (12) hours for 10 days. fluconazole 150 mg tablet Commonly known as:  DIFLUCAN Take one tab by mouth daily for one dose, repeat in 3 days if needed insulin lispro 100 unit/mL kwikpen Commonly known as:  HUMALOG  
AD  
  
 lancets 33 gauge Misc Commonly known as:  BD ULTRA FINE LANCETS Check blood sugar 5 times per day LANTUS SOLOSTAR 100 unit/mL (3 mL) Inpn Generic drug:  insulin glargine 8  units in AM  
  
 multivitamin tablet Commonly known as:  ONE A DAY Take 1 Tab by mouth daily. Gauri Pen Needle 32 gauge x 5/32\" Ndle Generic drug:  Insulin Needles (Disposable)  
  
 norgestimate-ethinyl estradiol 0.18/0.215/0.25 mg-25 mcg Tab Commonly known as:  ORTHO TRI-CYCLEN LO  
  
  
  
  
To-Do List   
 10/26/2017 8:30 AM  
  Appointment with [facility] 1 at [address] [place] [address] ([phone]) Shower or bathe using soap and water. Do not use deodorant, powder, perfumes, or lotion the day of your exam.  If your prior mammograms were not performed at [place] [address] please bring films with you or forward prior images 2 days before your procedure. If patient is not a callback diagnostic, the patient must have an order/script from the physician for the diagnostic. Please bring it on the day of the mammogram or have it faxed to the department. [facility]  [phone] [facility] [phone] [address] [phone] [facility] [phone] [facility] [phone] [facility] [phone]  
  
 10/26/2017 9:00 AM  
  Appointment with [facility] 2 at [facility] ([phone]) Shower or bathe using soap and water. Do not use deodorant, powder, perfumes, or lotion. If your prior films were not performed at a local [facility] facility please bring or forward prior images 2 days before procedure. Patient Instructions 1) Decrease your Lantus to 6 units every morning. 2) If you have a higher carb meal take 2 units of Humalog. 3) Mail me some blood sugar logs in 3 weeks. Introducing [facility]! Dear Bia Zamora: Thank you for requesting a [employer] account.   Our records indicate that you already have an active Next audience account. You can access your account anytime at [email or web address] Did you know that you can access your hospital and ER discharge instructions at any time in Next audience? You can also review all of your test results from your hospital stay or ER visit. Additional Information If you have questions, please visit the Frequently Asked Questions section of the Next audience website at [email or web address] Remember, Next audience is NOT to be used for urgent needs. For medical emergencies, dial 911. Now available from your iPhone and Android! Please provide this summary of care documentation to your next provider. Your primary care clinician is listed as Adrienne Willett. If you have any questions after today's visit, please call [phone].

## 2017-10-26 ENCOUNTER — HOSPITAL ENCOUNTER (OUTPATIENT)
Dept: MAMMOGRAPHY | Age: 52
Discharge: HOME | End: 2017-10-26
Attending: OBSTETRICS & GYNECOLOGY
Payer: COMMERCIAL

## 2017-10-26 ENCOUNTER — HOSPITAL ENCOUNTER (OUTPATIENT)
Dept: ULTRASOUND IMAGING | Age: 52
Discharge: HOME | End: 2017-10-26
Attending: OBSTETRICS & GYNECOLOGY
Payer: COMMERCIAL

## 2017-10-26 DIAGNOSIS — R92.8 ABNORMAL MAMMOGRAM: ICD-10-CM

## 2017-10-26 DIAGNOSIS — Z12.31 VISIT FOR SCREENING MAMMOGRAM: ICD-10-CM

## 2017-10-26 PROCEDURE — 77065 DX MAMMO INCL CAD UNI: CPT

## 2017-11-09 ENCOUNTER — TELEPHONE (OUTPATIENT)
Dept: ENDOCRINOLOGY | Age: 52
End: 2017-11-09

## 2017-11-09 RX ORDER — INSULIN LISPRO 100 [IU]/ML
INJECTION, SOLUTION INTRAVENOUS; SUBCUTANEOUS
Qty: 15 ML | Refills: 3 | Status: SHIPPED | OUTPATIENT
Start: 2017-11-09 | End: 2018-01-04 | Stop reason: CLARIF

## 2017-11-09 RX ORDER — INSULIN GLARGINE 100 [IU]/ML
INJECTION, SOLUTION SUBCUTANEOUS
Refills: 12 | COMMUNITY
Start: 2017-11-09 | End: 2018-01-18 | Stop reason: CLARIF

## 2017-11-09 NOTE — TELEPHONE ENCOUNTER
----- Message from Regulo Galvanorhees sent at 2017 10:04 AM EST -----  Regarding: RE: Non-Urgent Medical Question  Great! [facility] [address]. Thank you! Nick Davidson      ----- Message -----  From: Robbie Crump MD  Sent: 17, 8:19 AM  To: Regulo Cortez  Subject: RE: Non-Urgent Medical Question    This is Dr. Anthony Rodriguez, covering for Dr. Rayne Siddiqi, who is away until Monday. These sugars look excellent and I wouldn't make any changes! Keep up the good work. It definitely appears the humalog is working well. I'm happy to send a supply of these pens to either a local pharmacy or mail order pharmacy of your choice. Let me know and I'll take care of this later today.        ----- Message -----     From: Regulo Cortez     Sent: 2017  6:48 AM EST       To: Nelle Duverney, MD  Subject: Non-Urgent Medical Question    Dr. Rayne Siddiqi,  Here are my numbers since my medication switch to six units of Lantus in the morning and then bolus with Humalog as needed. I have only used the bolus 4-5 times during this time. I am still getting used to giving myself additional shots but expect I will Bolus more frequently as my comfort level to do so increases. The few high numbers are examples of when I did not bolus. You requested this update during the third week to determine if Humalog is working to order a new prescription before the medication . I have been pleased with the bolus option. Thank you!   Nick Davidson

## 2017-12-18 ENCOUNTER — TELEPHONE (OUTPATIENT)
Dept: ENDOCRINOLOGY | Age: 52
End: 2017-12-18

## 2017-12-18 NOTE — TELEPHONE ENCOUNTER
----- Message from [facility] sent at 12/18/2017  8:41 AM EST -----  Regarding: Dr. Alissa Hills  Pt stated her [employer] is changing to [employer], and needs pre-authorization on some of her medications \"Accucheck Kit Comp plus\" \"Acutest Comp Plus\" \"Lantis Solo Pen 100 unit per ml\". [facility] [phone]. Pt best contact [phone].

## 2018-01-04 ENCOUNTER — TELEPHONE (OUTPATIENT)
Dept: ENDOCRINOLOGY | Age: 53
End: 2018-01-04

## 2018-01-04 RX ORDER — INSULIN ASPART 100 [IU]/ML
INJECTION, SOLUTION INTRAVENOUS; SUBCUTANEOUS
Qty: 15 ML | Refills: 3 | Status: SHIPPED | OUTPATIENT
Start: 2018-01-04 | End: 2018-11-12 | Stop reason: SDUPTHER

## 2018-01-04 NOTE — TELEPHONE ENCOUNTER
Received a fax that Humalog is not covered so will change to Novolog and notified her over mychart of this change.

## 2018-01-18 RX ORDER — INSULIN GLARGINE 100 [IU]/ML
INJECTION, SOLUTION SUBCUTANEOUS
Qty: 15 ML | Refills: 3 | Status: SHIPPED | OUTPATIENT
Start: 2018-01-18 | End: 2018-11-12 | Stop reason: SDUPTHER

## 2018-01-18 RX ORDER — BLOOD-GLUCOSE METER
EACH MISCELLANEOUS
Qty: 1 EACH | Refills: 0 | Status: SHIPPED | OUTPATIENT
Start: 2018-01-18 | End: 2018-01-29 | Stop reason: SDUPTHER

## 2018-01-29 ENCOUNTER — OFFICE VISIT (OUTPATIENT)
Dept: PRIMARY CARE CLINIC | Age: 53
End: 2018-01-29

## 2018-01-29 VITALS
DIASTOLIC BLOOD PRESSURE: 74 MMHG | SYSTOLIC BLOOD PRESSURE: 109 MMHG | BODY MASS INDEX: 22.13 KG/M2 | HEART RATE: 64 BPM | HEIGHT: 65 IN | RESPIRATION RATE: 17 BRPM | OXYGEN SATURATION: 97 % | TEMPERATURE: 98.3 F | WEIGHT: 132.8 LBS

## 2018-01-29 DIAGNOSIS — J11.1 INFLUENZA: Primary | ICD-10-CM

## 2018-01-29 LAB
QUICKVUE INFLUENZA TEST: NEGATIVE
VALID INTERNAL CONTROL?: YES

## 2018-01-29 RX ORDER — OSELTAMIVIR PHOSPHATE 75 MG/1
75 CAPSULE ORAL 2 TIMES DAILY
Qty: 10 CAP | Refills: 0 | Status: SHIPPED | OUTPATIENT
Start: 2018-01-29 | End: 2018-02-03

## 2018-01-29 RX ORDER — INSULIN LISPRO 100 [IU]/ML
INJECTION, SOLUTION INTRAVENOUS; SUBCUTANEOUS
Refills: 3 | COMMUNITY
Start: 2017-11-09 | End: 2018-05-21

## 2018-01-29 RX ORDER — LANCETS
EACH MISCELLANEOUS
Refills: 11 | COMMUNITY
Start: 2017-11-26 | End: 2018-05-21

## 2018-01-29 NOTE — PROGRESS NOTES
Chief Complaint   Patient presents with    Generalized Body Aches   Pt c/o nausea, body aches and headache, exposure to grandchild who was dx with flu, pt states she has received flu shot. This note will not be viewable in [address]

## 2018-01-30 NOTE — PROGRESS NOTES
Subjective:   Марина Castillo is a 50 y.o. female who complains of congestion, sore throat, nasal blockage, dry cough, myalgias, headache and fever for 1 days, rapidly worsening since that time. She denies a history of shortness of breath and wheezing. Evaluation to date: none. Treatment to date: OTC products. Patient does not smoke cigarettes. Relevant PMH: No pertinent additional PMH. Patient Active Problem List   Diagnosis Code    Acute serous otitis media H65.00    Type 1 diabetes mellitus without complication ([facility] X75.1     Patient Active Problem List    Diagnosis Date Noted    Type 1 diabetes mellitus without complication ([facility] 46/48/7717    Acute serous otitis media 10/07/2015     Current Outpatient Prescriptions   Medication Sig Dispense Refill    ACCU-CHEK COMPACT PLUS TEST strp USE 1 STRIP TO TEST BLOOD SUGAR UP TO FOUR TIMES DAILY  11    HUMALOG KWIKPEN 100 unit/mL kwikpen INJECT 2 UNITS UP TO 3 TIMES DAILY WITH HIGHER CARB MEAL. MAX 50 UNITS PER DAY  3    oseltamivir (TAMIFLU) 75 mg capsule Take 1 Cap by mouth two (2) times a day for 5 days. 10 Cap 0    insulin glargine (BASAGLAR KWIKPEN) 100 unit/mL (3 mL) inpn 6 units daily 15 mL 3    glucose blood VI test strips (ONETOUCH VERIO) strip Check blood sugars 4 times per day 200 Strip 11    NOVOLOG FLEXPEN 100 unit/mL inpn Inject 2 units up to 3 times daily with higher carb meal.  Max 50 units per day--replaces humalog 15 mL 3    HYUN PEN NEEDLE 32 gauge x 5/32\" ndle       lancets (BD ULTRA FINE LANCETS) 33 gauge misc Check blood sugar 5 times per day 200 Lancet 3    norgestimate-ethinyl estradiol (ORTHO TRI-CYCLEN LO) 0.18/0.215/0.25 mg-25 mcg tab   12    multivitamin (ONE A DAY) tablet Take 1 Tab by mouth daily. Allergies   Allergen Reactions    Tree Nut Anaphylaxis     Not true allergy.  Peanut sensitivity via allergy testing     Past Medical History:   Diagnosis Date    Diabetes ([facility] 07/2017    Type 1 diabetes Past Surgical History:   Procedure Laterality Date    HX  SECTION Bilateral     IMPLANT BREAST SILICONE/EQ Bilateral          Family History   Problem Relation Age of Onset    Hypertension Mother     Stroke Mother     Heart Disease Mother      COPD    Lung Disease Mother      COPD    No Known Problems Father     No Known Problems Brother     Cancer Maternal Aunt      Lung, Yimi and Colon    Breast Cancer Maternal Aunt     Cancer Maternal Grandmother      Pancreatic    Thyroid Disease Brother     Breast Cancer Maternal Aunt     Diabetes Neg Hx      Social History   Substance Use Topics    Smoking status: Never Smoker    Smokeless tobacco: Never Used    Alcohol use 0.0 oz/week     0 Standard drinks or equivalent per week      Comment: social        Review of Systems  Pertinent items are noted in HPI. Objective:     Visit Vitals    /74 (BP 1 Location: Left arm, BP Patient Position: Sitting)    Pulse 64    Temp 98.3 °F (36.8 °C) (Oral)    Resp 17    Ht 5' 5\" (1.651 m)    Wt 132 lb 12.8 oz (60.2 kg)    SpO2 97%    BMI 22.1 kg/m2     General:  alert, cooperative, no distress   Eyes: negative   Ears: normal TM's and external ear canals AU   Sinuses: Normal paranasal sinuses without tenderness   Mouth:  Lips, mucosa, and tongue normal. Teeth and gums normal   Neck: supple, symmetrical, trachea midline and no adenopathy. Heart: S1 and S2 normal, no murmurs noted. Lungs: clear to auscultation bilaterally   Abdomen: soft, non-tender. Bowel sounds normal. No masses,  no organomegaly        Assessment/Plan:   influenza  Suggested symptomatic OTC remedies. RTC prn. Discussed diagnosis and treatment of viral URIs. Discussed the importance of avoiding unnecessary antibiotic therapy. ICD-10-CM ICD-9-CM    1. Influenza J11.1 487.1 oseltamivir (TAMIFLU) 75 mg capsule      AMB POC RAPID INFLUENZA TEST   .

## 2018-01-30 NOTE — PATIENT INSTRUCTIONS

## 2018-02-05 ENCOUNTER — OFFICE VISIT (OUTPATIENT)
Dept: ENDOCRINOLOGY | Age: 53
End: 2018-02-05

## 2018-02-05 VITALS
BODY MASS INDEX: 21.79 KG/M2 | WEIGHT: 130.8 LBS | HEART RATE: 73 BPM | HEIGHT: 65 IN | DIASTOLIC BLOOD PRESSURE: 77 MMHG | SYSTOLIC BLOOD PRESSURE: 103 MMHG

## 2018-02-05 DIAGNOSIS — E10.9 TYPE 1 DIABETES MELLITUS WITHOUT COMPLICATION (HCC): Primary | ICD-10-CM

## 2018-02-05 LAB — HBA1C MFR BLD HPLC: 5.1 %

## 2018-02-05 RX ORDER — LANCETS
EACH MISCELLANEOUS
Qty: 200 EACH | Refills: 11 | Status: SHIPPED | OUTPATIENT
Start: 2018-02-05 | End: 2018-11-12 | Stop reason: SDUPTHER

## 2018-02-05 NOTE — PROGRESS NOTES
Chief Complaint   Patient presents with    Diabetes     pcp and pharmacy verified. Release signed for eye exam.    Records since last visit reviewed  History of Present Illness: Nikki Massey is a 50 y.o. female here for follow up of diabetes. Pt notes that over [facility] day weekend (2017)  she \"came down with a Virus and sinus infection\" She was put on Abx and she noted polyuria, polydipsia and blurred vision. She had also lost 10 pounds in a short period of time. She saw her eye doctor who noted swelling in her cornia (will request these records). The swelling improved but did not normalize, her eye doctor recommended she get labs drawn and her PCP cecilia labs, her BG was 300 and her PCP started her on Glipizide. She continued to have symptoms so she checked her BG on a glucometer said \"high\" so she went to the ED and her BG >600. Her A1C was 13.1%. AB testing showed positive FRANCE and CRP. At our initial visit in August 2017 I tested her for autoimmune DM   Her insulin, proinsulin and c-peptide were low normal and one of her 4 antibodies were positive. (ZN-8). This fits with Auto-immune DM Type 1/NII. At our last visit in October 2017 she was taking Lantus 8 units daily and was still having low FBGs, but higher BGs during the day. I intructed her to decrease her Lantus to 6 units daily and started her on Humalog 2 units with each meal, which was switched to Novolog for formulary requirements. Her A1C today was 5.1%. Pt is still taking Basaglar 6 units daily and Novolog with meals. She notes that with experimentation her carb ratio seems to do will with a 1:15-1:17. She notes that she is not eating a high carb diet and her meal may only include 8-10 carbs and she would not give herself Novolog for this. On these occasions her BGs tend to rise to the 130's range. She notes the low BGs have continued to become less frequent.     Pt notes she does not eat three meals, but tends to \"graze\" during the day. She eats every 2 hours and will cover her carbs as needed. She will have dinner around 5-6PM.    Exercise consists of treadmill/cardio for 30 minutes in the morning. If her BGs are under 80 she does not work out in the morning, because she notes that if she works out her BGs will drop 40 points. She also has lots of house and yard work. No history of vascular disease. No history of neuropathy, or nephropathy. Last eye exam was June 2017, no retinopathy. Current Outpatient Prescriptions   Medication Sig    glucose blood VI test strips (ONETOUCH VERIO) strip Check blood sugar up to 4 times per day    Lancets (ONETOUCH ULTRASOFT LANCETS) misc Check blood sugar up to 4 times per day.  ACCU-CHEK COMPACT PLUS TEST strp USE 1 STRIP TO TEST BLOOD SUGAR UP TO FOUR TIMES DAILY    insulin glargine (BASAGLAR KWIKPEN) 100 unit/mL (3 mL) inpn 6 units daily    glucose blood VI test strips (ONETOUCH VERIO) strip Check blood sugars 4 times per day    NOVOLOG FLEXPEN 100 unit/mL inpn Inject 2 units up to 3 times daily with higher carb meal.  Max 50 units per day--replaces humalog    lancets (BD ULTRA FINE LANCETS) 33 gauge misc Check blood sugar 5 times per day    multivitamin (ONE A DAY) tablet Take 1 Tab by mouth daily.  HUMALOG KWIKPEN 100 unit/mL kwikpen INJECT 2 UNITS UP TO 3 TIMES DAILY WITH HIGHER CARB MEAL. MAX 50 UNITS PER DAY    HYUN PEN NEEDLE 32 gauge x 5/32\" ndle     norgestimate-ethinyl estradiol (ORTHO TRI-CYCLEN LO) 0.18/0.215/0.25 mg-25 mcg tab      No current facility-administered medications for this visit. Allergies   Allergen Reactions    Tree Nut Anaphylaxis     Not true allergy.  Peanut sensitivity via allergy testing     Review of Systems:  - Eyes: no blurry vision or double vision  - Cardiovascular: no chest pain  - Respiratory: no shortness of breath  - Musculoskeletal: no myalgias  - Neurological: no numbness/tingling in extremities    Physical Examination:  Blood pressure 103/77, pulse 73, height 5' 5\" (1.651 m), weight 130 lb 12.8 oz (59.3 kg). - General: pleasant, no distress, good eye contact   - Neck: no carotid bruits  - Cardiovascular: regular, normal rate, nl s1 and s2, no m/r/g, 2+ DP pulses   - Respiratory: clear bilaterally  - Integumentary: no edema, no foot ulcers, sensation to monofilament and vibration intact bilaterally  - Psychiatric: normal mood and affect    Data Reviewed:   Her A1C today was 5.1%    Assessment/Plan:   1) DM > Her A1C today was 5.1%. Pt encouraged to continue the Basaglar 6 units daily and Novolog 1:15 carb ratio. Pt to check her BGs AC/HS and mail her BG logs to me in 3 weeks. BP at goal on no HTN agents. Pt voices understanding and agreement with the plan. RTC 3 months       Follow-up Disposition:  Return in about 3 months (around 5/5/2018).     Copy sent to:  Dr. Ira Ludwig

## 2018-05-01 ENCOUNTER — OFFICE VISIT (OUTPATIENT)
Dept: PRIMARY CARE CLINIC | Age: 53
End: 2018-05-01

## 2018-05-01 VITALS
HEART RATE: 77 BPM | TEMPERATURE: 98.2 F | OXYGEN SATURATION: 99 % | RESPIRATION RATE: 17 BRPM | SYSTOLIC BLOOD PRESSURE: 106 MMHG | DIASTOLIC BLOOD PRESSURE: 76 MMHG | BODY MASS INDEX: 21.83 KG/M2 | HEIGHT: 65 IN | WEIGHT: 131 LBS

## 2018-05-01 DIAGNOSIS — J01.00 ACUTE MAXILLARY SINUSITIS, RECURRENCE NOT SPECIFIED: Primary | ICD-10-CM

## 2018-05-01 RX ORDER — AMOXICILLIN AND CLAVULANATE POTASSIUM 875; 125 MG/1; MG/1
1 TABLET, FILM COATED ORAL EVERY 12 HOURS
Qty: 20 TAB | Refills: 0 | Status: SHIPPED | OUTPATIENT
Start: 2018-05-01 | End: 2018-05-11

## 2018-05-01 NOTE — PROGRESS NOTES
Chief Complaint   Patient presents with    Ear Pain    Nasal Congestion   pt c/o bilateral ear pain, sinus pressure, post nasal drip and nasal congestion x 1 week, pt states symptom have worsened over the past couple of days pt states she has tried otc mucinex and otc allergy pill to help with discomfort. This note will not be viewable in [address]

## 2018-05-01 NOTE — MR AVS SNAPSHOT
[address] 
[phone] Patient: Mychal Aleman MRN: [id number] :[id number] Visit Information Date & Time Provider Department Dept. Phone Encounter #  
 2018 10:00 AM Antoni Dyer, [address] [place] [address] [phone] [id number] Your Appointments 2018  8:50 AM  
Follow Up with MD [facility] Appt Note: 3 month f/u  
 305 [facility] [address] [id number] 570 [facility] Upcoming Health Maintenance Date Due  
 EYE EXAM RETINAL OR DILATED Q1 1977 Pneumococcal 19-64 Medium Risk (1 of 1 - PPSV23) 1986 DTaP/Tdap/Td series (1 - Tdap) 1988 PAP AKA CERVICAL CYTOLOGY 1988 MICROALBUMIN Q1 2018 LIPID PANEL Q1 2018 FOBT Q 1 YEAR AGE 50-75 2018 Influenza Age 5 to Adult 2018 HEMOGLOBIN A1C Q6M 2018 FOOT EXAM Q1 2019 BREAST CANCER SCRN MAMMOGRAM 10/26/2019 Allergies as of 2018  Review Complete On: 2018 By: Antoni Dyer MD  
  
 Severity Noted Reaction Type Reactions Tree Nut High 2017    Anaphylaxis Not true allergy. Peanut sensitivity via allergy testing Current Immunizations  Never Reviewed No immunizations on file. Not reviewed this visit You Were Diagnosed With   
  
 Codes Comments Acute maxillary sinusitis, recurrence not specified    -  Primary ICD-10-CM: J01.00 ICD-9-CM: 461.0 Vitals BP Pulse Temp Resp Height(growth percentile) Weight(growth percentile) 106/76 (BP 1 Location: Left arm, BP Patient Position: Sitting) 77 98.2 °F (36.8 °C) (Oral) 17 5' 5\" (1.651 m) 131 lb (59.4 kg) SpO2 BMI OB Status Smoking Status 99% 21.8 kg/m2 Having regular periods Never Smoker Vitals History BMI and BSA Data Body Mass Index Body Surface Area  
 21.8 kg/m 2 1.65 m 2 Preferred Pharmacy Pharmacy Name Phone [phone] [facility] [phone] Your Updated Medication List  
  
   
This list is accurate as of 5/1/18 10:23 AM.  Always use your most recent med list.  
  
  
  
  
 ACCU-CHEK COMPACT PLUS TEST Strp Generic drug:  Blood Sugar Diagnostic, Drum USE 1 STRIP TO TEST BLOOD SUGAR UP TO FOUR TIMES DAILY  
  
 amoxicillin-clavulanate 875-125 mg per tablet Commonly known as:  AUGMENTIN Take 1 Tab by mouth every twelve (12) hours for 10 days. * glucose blood VI test strips strip Commonly known as:  Hipolito Can Check blood sugars 4 times per day * glucose blood VI test strips strip Commonly known as:  Hipolito Can Check blood sugar up to 4 times per day HumaLOG KwikPen Insulin 100 unit/mL kwikpen Generic drug:  insulin lispro INJECT 2 UNITS UP TO 3 TIMES DAILY WITH HIGHER CARB MEAL. MAX 50 UNITS PER DAY  
  
 insulin glargine 100 unit/mL (3 mL) Inpn Commonly known as:  BASAGLAR KWIKPEN U-100 INSULIN  
6 units daily * lancets 33 gauge Misc Commonly known as:  BD ULTRA FINE LANCETS Check blood sugar 5 times per day * Lancets Misc Commonly known as:  ONETOUCH ULTRASOFT LANCETS Check blood sugar up to 4 times per day. multivitamin tablet Commonly known as:  ONE A DAY Take 1 Tab by mouth daily. Gauri Pen Needle 32 gauge x 5/32\" Ndle Generic drug:  Insulin Needles (Disposable) NovoLOG Flexpen U-100 Insulin 100 unit/mL Inpn Generic drug:  insulin aspart U-100 Inject 2 units up to 3 times daily with higher carb meal.  Max 50 units per day--replaces humalog * Notice: This list has 4 medication(s) that are the same as other medications prescribed for you.  Read the directions carefully, and ask your doctor or other care provider to review them with you. Prescriptions Sent to Pharmacy Refills  
 amoxicillin-clavulanate (AUGMENTIN) 875-125 mg per tablet 0 Sig: Take 1 Tab by mouth every twelve (12) hours for 10 days. Class: Normal  
 Pharmacy: [facility] , [address] [place] [address] #: [phone] Route: Oral  
  
To-Do List   
 05/16/2018 7:30 AM  
  Appointment with [facility] 2 at [address] [place] [address] ([phone]) Shower or bathe using soap and water. Do not use deodorant, powder, perfumes, or lotion the day of your exam.  If your prior mammograms were not performed at [facility] 6 please bring films with you or forward prior images 2 days before your procedure. Check in at registration 15min before your appointment time unless you were instructed to do otherwise. A script is not necessary, but if you have one, please bring it on the day of the mammogram or have it faxed to the department. [facility] [phone] [facility] [place]  [phone] [facility] [address] [place]  [phone] [phone] [address] [place] [phone] Patient Instructions Saline Nasal Washes: Care Instructions Your Care Instructions Saline nasal washes help keep the nasal passages open by washing out thick or dried mucus. This simple remedy can help relieve symptoms of allergies, sinusitis, and colds. It also can make the nose feel more comfortable by keeping the mucous membranes moist. You may notice a little burning sensation in your nose the first few times you use the solution, but this usually gets better in a few days. Follow-up care is a key part of your treatment and safety. Be sure to make and go to all appointments, and call your doctor if you are having problems. It's also a good idea to know your test results and keep a list of the medicines you take. How can you care for yourself at home? · You can buy premixed saline solution in a squeeze bottle or other sinus rinse products at a drugstore. Read and follow the instructions on the label. · You also can make your own saline solution by adding 1 teaspoon of salt and 1 teaspoon of baking soda to 2 cups of distilled water. · If you use a homemade solution, pour a small amount into a clean bowl. Using a rubber bulb syringe, squeeze the syringe and place the tip in the salt water. Pull a small amount of the salt water into the syringe by relaxing your hand. · Sit down with your head tilted slightly back. Do not lie down. Put the tip of the bulb syringe or the squeeze bottle a little way into one of your nostrils. Gently drip or squirt a few drops into the nostril. Repeat with the other nostril. Some sneezing and gagging are normal at first. 
· Gently blow your nose. · Wipe the syringe or bottle tip clean after each use. · Repeat this 2 or 3 times a day. · Use nasal washes gently if you have nosebleeds often. When should you call for help? Watch closely for changes in your health, and be sure to contact your doctor if: 
? · You often get nosebleeds. ? · You have problems doing the nasal washes. Where can you learn more? Go to http://padmaja-tiffany.info/. Enter [id number] in the search box to learn more about \"Saline Nasal Washes: Care Instructions. \" Current as of: May 12, 2017 Content Version: 11.4 © 6925-3644 Doodle. Care instructions adapted under license by [employer] (which disclaims liability or warranty for this information). If you have questions about a medical condition or this instruction, always ask your healthcare professional. [place] [address] any warranty or liability for your use of this information. Sinusitis: Care Instructions Your Care Instructions Sinusitis is an infection of the lining of the sinus cavities in your head. Sinusitis often follows a cold. It causes pain and pressure in your head and face. In most cases, sinusitis gets better on its own in 1 to 2 weeks. But some mild symptoms may last for several weeks. Sometimes antibiotics are needed. Follow-up care is a key part of your treatment and safety. Be sure to make and go to all appointments, and call your doctor if you are having problems. It's also a good idea to know your test results and keep a list of the medicines you take. How can you care for yourself at home? · Take an over-the-counter pain medicine, such as acetaminophen (Tylenol), ibuprofen (Advil, Motrin), or naproxen (Aleve). Read and follow all instructions on the label. · If the doctor prescribed antibiotics, take them as directed. Do not stop taking them just because you feel better. You need to take the full course of antibiotics. · Be careful when taking over-the-counter cold or flu medicines and Tylenol at the same time. Many of these medicines have acetaminophen, which is Tylenol. Read the labels to make sure that you are not taking more than the recommended dose. Too much acetaminophen (Tylenol) can be harmful. · Breathe warm, moist air from a steamy shower, a hot bath, or a sink filled with hot water. Avoid cold, dry air. Using a humidifier in your home may help. Follow the directions for cleaning the machine. · Use saline (saltwater) nasal washes to help keep your nasal passages open and wash out mucus and bacteria. You can buy saline nose drops at a grocery store or drugstore. Or you can make your own at home by adding 1 teaspoon of salt and 1 teaspoon of baking soda to 2 cups of distilled water. If you make your own, fill a bulb syringe with the solution, insert the tip into your nostril, and squeeze gently. Mickiel Sickle your nose. · Put a hot, wet towel or a warm gel pack on your face 3 or 4 times a day for 5 to 10 minutes each time. · Try a decongestant nasal spray like oxymetazoline (Afrin). Do not use it for more than 3 days in a row. Using it for more than 3 days can make your congestion worse. When should you call for help? Call your doctor now or seek immediate medical care if: 
? · You have new or worse swelling or redness in your face or around your eyes. ? · You have a new or higher fever. ? Watch closely for changes in your health, and be sure to contact your doctor if: 
? · You have new or worse facial pain. ? · The mucus from your nose becomes thicker (like pus) or has new blood in it. ? · You are not getting better as expected. Where can you learn more? Go to http://padmaja-tiffany.info/. Enter Q219 in the search box to learn more about \"Sinusitis: Care Instructions. \" Current as of: May 12, 2017 Content Version: 11.4 © 5597-9746 [employer] Grids. Care instructions adapted under license by [employer] (which disclaims liability or warranty for this information). If you have questions about a medical condition or this instruction, always ask your healthcare professional. [place] [address] any warranty or liability for your use of this information. Introducing [facility]! Dear Lizzette Washington: Thank you for requesting a CuÃ­date account. Our records indicate that you already have an active CuÃ­date account. You can access your account anytime at [email or web address] Did you know that you can access your hospital and ER discharge instructions at any time in CuÃ­date? You can also review all of your test results from your hospital stay or ER visit. Additional Information If you have questions, please visit the Frequently Asked Questions section of the CuÃ­date website at [email or web address] Remember, CuÃ­date is NOT to be used for urgent needs. For medical emergencies, dial 911. Now available from your iPhone and Android! Please provide this summary of care documentation to your next provider. Your primary care clinician is listed as Adrienne Willett. If you have any questions after today's visit, please call [phone].

## 2018-05-01 NOTE — PROGRESS NOTES
Mercy Stewart is a 50 y.o. female who presents for left ear and sinus pain, temp 100 last night. Symptoms started a week ago but worse over the weekend. Coughing productive of green sputum. Sore throat earlier but better now. She is taking allegra, flonase, sudafed, mucinex, tylenol. Past Medical History:   Diagnosis Date    Diabetes ([facility] [address] 2017    Type 1 diabetes     Past Surgical History:   Procedure Laterality Date    HX  SECTION Bilateral     IMPLANT BREAST SILICONE/EQ Bilateral     2008       Meds:   Current Outpatient Prescriptions   Medication Sig Dispense Refill    glucose blood VI test strips (ONETOUCH VERIO) strip Check blood sugar up to 4 times per day 200 Strip 11    ACCU-CHEK COMPACT PLUS TEST strp USE 1 STRIP TO TEST BLOOD SUGAR UP TO FOUR TIMES DAILY  11    HUMALOG KWIKPEN 100 unit/mL kwikpen INJECT 2 UNITS UP TO 3 TIMES DAILY WITH HIGHER CARB MEAL. MAX 50 UNITS PER DAY  3    insulin glargine (BASAGLAR KWIKPEN) 100 unit/mL (3 mL) inpn 6 units daily 15 mL 3    glucose blood VI test strips (ONETOUCH VERIO) strip Check blood sugars 4 times per day 200 Strip 11    HYUN PEN NEEDLE 32 gauge x 5/32\" ndle       lancets (BD ULTRA FINE LANCETS) 33 gauge misc Check blood sugar 5 times per day 200 Lancet 3    multivitamin (ONE A DAY) tablet Take 1 Tab by mouth daily.  Lancets (ONETOUCH ULTRASOFT LANCETS) misc Check blood sugar up to 4 times per day. 200 Each 11    NOVOLOG FLEXPEN 100 unit/mL inpn Inject 2 units up to 3 times daily with higher carb meal.  Max 50 units per day--replaces humalog 15 mL 3       Allergies: Allergies   Allergen Reactions    Tree Nut Anaphylaxis     Not true allergy.  Peanut sensitivity via allergy testing       Smoker:  History   Smoking Status    Never Smoker   Smokeless Tobacco    Never Used       ETOH:   History   Alcohol Use    0.0 oz/week    0 Standard drinks or equivalent per week     Comment: social       FH:   Family History   Problem Relation Age of Onset    Hypertension Mother     Stroke Mother     Heart Disease Mother      COPD    Lung Disease Mother      COPD    No Known Problems Father     No Known Problems Brother     Cancer Maternal Aunt      Lung, Brest and Colon    Breast Cancer Maternal Aunt     Cancer Maternal Grandmother      Pancreatic    Thyroid Disease Brother     Breast Cancer Maternal Aunt     Diabetes Neg Hx        ROS:  General/Constitutional:   No fatigue, weight loss or weight gain       Eyes:   No redness, pruritis, pain, visual changes, swelling, or discharge      Ears:    No pain, loss or changes in hearing     Nose: Nasal congestion and rhinorrea  Neck:   No swelling, masses, stiffness, pain, or limited movement     Cardiac:    No chest pain      Respiratory:  cough   GI:   No nausea/vomiting, diarrhea, abdominal pain, bloody or dark stools       Skin: No rash     Physical Exam:  Visit Vitals    /76 (BP 1 Location: Left arm, BP Patient Position: Sitting)    Pulse 77    Temp 98.2 °F (36.8 °C) (Oral)    Resp 17    Ht 5' 5\" (1.651 m)    Wt 131 lb (59.4 kg)    SpO2 99%    BMI 21.8 kg/m2     General: Alert and oriented, in no acute distress. Responds to all questions appropriately. SKIN: No rash. Normal color. HEAD: maxillary sinus tenderness. EYES: Conjunctiva are clear; pupils round and reactive to light. EARS: External normal, canals clear, tympanic membranes normal.  NOSE: Edema, erythema, green mucous drainage. OROPHARYNX: Slight tonsil edema, erythema, no exudate. NECK: Supple; no masses; normal lymphadenopathy. LUNGS: Respirations unlabored; clear to auscultation bilaterally, no wheeze, rales or rhonchi. CARDIOVASCULAR: Regular, rate, and rhythm without murmurs, gallops or rubs. EXTREMITIES: No edema, cyanosis or clubbing. NEUROLOGIC: Speech intact; face symmetrical; moves all extremities equally      Assessment:    ICD-10-CM ICD-9-CM    1.  Acute maxillary sinusitis, recurrence not specified J01.00 461.0 amoxicillin-clavulanate (AUGMENTIN) 875-125 mg per tablet     Likely bacterial given progression of symptoms and mild fever yesterday. Start augmentin. Rest as below. Return PRN. Plan:  Discharge instructions:  1. Combination cough and cold medicine such as Mucinex DM  2. Salt water gargle. 3. Plenty of fluids. 4. Ibuprofen (Motrin, Advil):  200mg - take 1-4 tables three times as needed for pain and fever   5. Acetaminophen (Tylenol):  500mg 1-2 tablets every 6 hours as needed for pain and fever. 6. Throat lozenges such as Halls as needed. 7. Humidifier as needed. Follow Up:  Get re-examined if not improved in  5-7 days or if symptoms worsen. If you get suddenly worse, go to the nearest hospital Emergency Room      This note will not be viewable in MyChart.

## 2018-05-01 NOTE — PATIENT INSTRUCTIONS
Saline Nasal Washes: Care Instructions  Your Care Instructions  Saline nasal washes help keep the nasal passages open by washing out thick or dried mucus. This simple remedy can help relieve symptoms of allergies, sinusitis, and colds. It also can make the nose feel more comfortable by keeping the mucous membranes moist. You may notice a little burning sensation in your nose the first few times you use the solution, but this usually gets better in a few days. Follow-up care is a key part of your treatment and safety. Be sure to make and go to all appointments, and call your doctor if you are having problems. It's also a good idea to know your test results and keep a list of the medicines you take. How can you care for yourself at home? · You can buy premixed saline solution in a squeeze bottle or other sinus rinse products at a drugstore. Read and follow the instructions on the label. · You also can make your own saline solution by adding 1 teaspoon of salt and 1 teaspoon of baking soda to 2 cups of distilled water. · If you use a homemade solution, pour a small amount into a clean bowl. Using a rubber bulb syringe, squeeze the syringe and place the tip in the salt water. Pull a small amount of the salt water into the syringe by relaxing your hand. · Sit down with your head tilted slightly back. Do not lie down. Put the tip of the bulb syringe or the squeeze bottle a little way into one of your nostrils. Gently drip or squirt a few drops into the nostril. Repeat with the other nostril. Some sneezing and gagging are normal at first.  · Gently blow your nose. · Wipe the syringe or bottle tip clean after each use. · Repeat this 2 or 3 times a day. · Use nasal washes gently if you have nosebleeds often. When should you call for help? Watch closely for changes in your health, and be sure to contact your doctor if:  ? · You often get nosebleeds. ? · You have problems doing the nasal washes.    Where can you learn more? Go to http://padmaja-tiffany.info/. Enter [id number] in the search box to learn more about \"Saline Nasal Washes: Care Instructions. \"  Current as of: May 12, 2017  Content Version: 11.4  © 4731-0706 [employer]. Care instructions adapted under license by [employer]ix (which disclaims liability or warranty for this information). If you have questions about a medical condition or this instruction, always ask your healthcare professional. [address] any warranty or liability for your use of this information. Sinusitis: Care Instructions  Your Care Instructions    Sinusitis is an infection of the lining of the sinus cavities in your head. Sinusitis often follows a cold. It causes pain and pressure in your head and face. In most cases, sinusitis gets better on its own in 1 to 2 weeks. But some mild symptoms may last for several weeks. Sometimes antibiotics are needed. Follow-up care is a key part of your treatment and safety. Be sure to make and go to all appointments, and call your doctor if you are having problems. It's also a good idea to know your test results and keep a list of the medicines you take. How can you care for yourself at home? · Take an over-the-counter pain medicine, such as acetaminophen (Tylenol), ibuprofen (Advil, Motrin), or naproxen (Aleve). Read and follow all instructions on the label. · If the doctor prescribed antibiotics, take them as directed. Do not stop taking them just because you feel better. You need to take the full course of antibiotics. · Be careful when taking over-the-counter cold or flu medicines and Tylenol at the same time. Many of these medicines have acetaminophen, which is Tylenol. Read the labels to make sure that you are not taking more than the recommended dose. Too much acetaminophen (Tylenol) can be harmful.   · Breathe warm, moist air from a steamy shower, a hot bath, or a sink filled with hot water. Avoid cold, dry air. Using a humidifier in your home may help. Follow the directions for cleaning the machine. · Use saline (saltwater) nasal washes to help keep your nasal passages open and wash out mucus and bacteria. You can buy saline nose drops at a grocery store or drugstore. Or you can make your own at home by adding 1 teaspoon of salt and 1 teaspoon of baking soda to 2 cups of distilled water. If you make your own, fill a bulb syringe with the solution, insert the tip into your nostril, and squeeze gently. Macel Halt your nose. · Put a hot, wet towel or a warm gel pack on your face 3 or 4 times a day for 5 to 10 minutes each time. · Try a decongestant nasal spray like oxymetazoline (Afrin). Do not use it for more than 3 days in a row. Using it for more than 3 days can make your congestion worse. When should you call for help? Call your doctor now or seek immediate medical care if:  ? · You have new or worse swelling or redness in your face or around your eyes. ? · You have a new or higher fever. ? Watch closely for changes in your health, and be sure to contact your doctor if:  ? · You have new or worse facial pain. ? · The mucus from your nose becomes thicker (like pus) or has new blood in it. ? · You are not getting better as expected. Where can you learn more? Go to http://padmaja-tiffany.info/. Enter P296 in the search box to learn more about \"Sinusitis: Care Instructions. \"  Current as of: May 12, 2017  Content Version: 11.4  © 3096-1683 [employer]elloCut. Care instructions adapted under license by liveMag.ro (which disclaims liability or warranty for this information). If you have questions about a medical condition or this instruction, always ask your healthcare professional. [address] any warranty or liability for your use of this information.

## 2018-05-11 LAB
ALBUMIN SERPL-MCNC: 4 G/DL (ref 3.5–5.5)
ALBUMIN/CREAT UR: 8.9 MG/G CREAT (ref 0–30)
ALBUMIN/GLOB SERPL: 1.6 {RATIO} (ref 1.2–2.2)
ALP SERPL-CCNC: 50 IU/L (ref 39–117)
ALT SERPL-CCNC: 17 IU/L (ref 0–32)
AST SERPL-CCNC: 26 IU/L (ref 0–40)
BILIRUB SERPL-MCNC: 0.4 MG/DL (ref 0–1.2)
BUN SERPL-MCNC: 8 MG/DL (ref 6–24)
BUN/CREAT SERPL: 12 (ref 9–23)
CALCIUM SERPL-MCNC: 9.2 MG/DL (ref 8.7–10.2)
CHLORIDE SERPL-SCNC: 104 MMOL/L (ref 96–106)
CHOLEST SERPL-MCNC: 174 MG/DL (ref 100–199)
CO2 SERPL-SCNC: 28 MMOL/L (ref 18–29)
CREAT SERPL-MCNC: 0.69 MG/DL (ref 0.57–1)
CREAT UR-MCNC: 92 MG/DL
EST. AVERAGE GLUCOSE BLD GHB EST-MCNC: 105 MG/DL
GFR SERPLBLD CREATININE-BSD FMLA CKD-EPI: 102 ML/MIN/1.73
GFR SERPLBLD CREATININE-BSD FMLA CKD-EPI: 117 ML/MIN/1.73
GLOBULIN SER CALC-MCNC: 2.5 G/DL (ref 1.5–4.5)
GLUCOSE SERPL-MCNC: 74 MG/DL (ref 65–99)
HBA1C MFR BLD: 5.3 % (ref 4.8–5.6)
HDLC SERPL-MCNC: 83 MG/DL
INTERPRETATION, 910389: NORMAL
LDLC SERPL CALC-MCNC: 84 MG/DL (ref 0–99)
MICROALBUMIN UR-MCNC: 8.2 UG/ML
POTASSIUM SERPL-SCNC: 4.9 MMOL/L (ref 3.5–5.2)
PROT SERPL-MCNC: 6.5 G/DL (ref 6–8.5)
SODIUM SERPL-SCNC: 143 MMOL/L (ref 134–144)
TRIGL SERPL-MCNC: 33 MG/DL (ref 0–149)
VLDLC SERPL CALC-MCNC: 7 MG/DL (ref 5–40)

## 2018-05-16 ENCOUNTER — HOSPITAL ENCOUNTER (OUTPATIENT)
Dept: MAMMOGRAPHY | Age: 53
Discharge: HOME | End: 2018-05-16
Attending: OBSTETRICS & GYNECOLOGY
Payer: COMMERCIAL

## 2018-05-16 DIAGNOSIS — Z12.39 SCREENING BREAST EXAMINATION: ICD-10-CM

## 2018-05-16 PROCEDURE — 77067 SCR MAMMO BI INCL CAD: CPT

## 2018-05-21 ENCOUNTER — OFFICE VISIT (OUTPATIENT)
Dept: ENDOCRINOLOGY | Age: 53
End: 2018-05-21

## 2018-05-21 VITALS
HEART RATE: 64 BPM | WEIGHT: 128.8 LBS | SYSTOLIC BLOOD PRESSURE: 115 MMHG | BODY MASS INDEX: 21.46 KG/M2 | DIASTOLIC BLOOD PRESSURE: 76 MMHG | HEIGHT: 65 IN

## 2018-05-21 DIAGNOSIS — E10.9 TYPE 1 DIABETES MELLITUS WITHOUT COMPLICATION (HCC): Primary | ICD-10-CM

## 2018-08-22 ENCOUNTER — OFFICE VISIT (OUTPATIENT)
Dept: ENDOCRINOLOGY | Age: 53
End: 2018-08-22

## 2018-08-22 VITALS
HEIGHT: 65 IN | HEART RATE: 64 BPM | DIASTOLIC BLOOD PRESSURE: 77 MMHG | BODY MASS INDEX: 21.66 KG/M2 | SYSTOLIC BLOOD PRESSURE: 109 MMHG | WEIGHT: 130 LBS

## 2018-08-22 DIAGNOSIS — E10.9 TYPE 1 DIABETES MELLITUS WITHOUT COMPLICATION (HCC): Primary | ICD-10-CM

## 2018-08-22 LAB — HBA1C MFR BLD HPLC: 5 %

## 2018-08-22 RX ORDER — ACYCLOVIR 200 MG/1
CAPSULE ORAL AS NEEDED
COMMUNITY

## 2018-08-22 NOTE — PROGRESS NOTES
Chief Complaint   Patient presents with    Diabetes     pcp and pharmacy verified   Records since last visit reviewed  History of Present Illness: Casandra Young is a 50 y.o. female here for follow up of diabetes. Pt notes that over [address] day weekend (2017)  she \"came down with a Virus and sinus infection\" She was put on Abx and she noted polyuria, polydipsia and blurred vision. She had also lost 10 pounds in a short period of time. She saw her eye doctor who noted swelling in her cornia (will request these records). The swelling improved but did not normalize, her eye doctor recommended she get labs drawn and her PCP cecilia labs, her BG was 300 and her PCP started her on Glipizide. She continued to have symptoms so she checked her BG on a glucometer said \"high\" so she went to the ED and her BG >600. Her A1C was 13.1%. AB testing showed positive [place] and CRP. At our initial visit in August 2017 I tested her for autoimmune DM   Her insulin, proinsulin and c-peptide were low normal and one of her 4 antibodies were positive. (ZN-8). This fits with Auto-immune DM Type 1/NII. At our last visit in May 2018 her A1C was 5.3% on Basaglar 6 units daily and 1:15 carb ratio. Pt was encouraged to keep up the excellent work. Her A1C today was 5.0%. Pt notes she has had a very stressful summer and she is going for a root canal tomorrow. Pt will test her BGs after a workout, or before a high carb meal.  Pt notes she does have to drink Gatorade while she is working out. She notes that she will get a low BG around 50 sometimes, during her work-out. Pt notes that when she is going outside and doing yard work, she is prone to a low blood sugar, particularly if she is working for several hours and not eating. She is still taking Basaglar 6 units daily and Novolog 1:15 carb ratio. Pt notes she does not eat three meals, but tends to \"graze\" during the day.  She eats every 2 hours and will cover her carbs as needed. She will have dinner around 5-6PM.    Exercise consists of treadmill/cardio for 30 minutes most mornings. No history of vascular disease. No history of neuropathy, or nephropathy. Last eye exam was January 2018, no retinopathy. She notes her vision has improved. Current Outpatient Prescriptions   Medication Sig    acyclovir (ZOVIRAX) 200 mg capsule TAKE ONE CAPSULE BY MOUTH 5 TIMES A DAY    glucose blood VI test strips (ONETOUCH VERIO) strip Check blood sugar up to 4 times per day    Lancets (ONETOUCH ULTRASOFT LANCETS) misc Check blood sugar up to 4 times per day.  insulin glargine (BASAGLAR KWIKPEN) 100 unit/mL (3 mL) inpn 6 units daily    glucose blood VI test strips (ONETOUCH VERIO) strip Check blood sugars 4 times per day    NOVOLOG FLEXPEN 100 unit/mL inpn Inject 2 units up to 3 times daily with higher carb meal.  Max 50 units per day--replaces humalog    HYUN PEN NEEDLE 32 gauge x 5/32\" ndle     lancets (BD ULTRA FINE LANCETS) 33 gauge misc Check blood sugar 5 times per day    multivitamin (ONE A DAY) tablet Take 1 Tab by mouth daily. No current facility-administered medications for this visit. Allergies   Allergen Reactions    Tree Nut Anaphylaxis     Not true allergy. Peanut sensitivity via allergy testing     Review of Systems:  - Eyes: no blurry vision or double vision  - Cardiovascular: no chest pain  - Respiratory: no shortness of breath  - Musculoskeletal: no myalgias  - Neurological: no numbness/tingling in extremities    Physical Examination:  Blood pressure 109/77, pulse 64, height 5' 5\" (1.651 m), weight 130 lb (59 kg).   - General: pleasant, no distress, good eye contact   - Neck: no carotid bruits  - Cardiovascular: regular, normal rate, nl s1 and s2, no m/r/g, 2+ DP pulses   - Respiratory: clear bilaterally  - Integumentary: no edema, no foot ulcers,   - Psychiatric: normal mood and affect    Diabetic foot exam:     Left Foot:   Visual Exam: normal Pulse DP: 2+ (normal)   Filament test: normal sensation    Vibratory sensation: normal      Right Foot:   Visual Exam: normal    Pulse DP: 2+ (normal)   Filament test: normal sensation    Vibratory sensation: normal        Data Reviewed:   Her A1C today was 5.0%. Assessment/Plan:   1) DM > Her A1C today was 5.0%. She has had low BGs when she works out and when she is working out in the yard. Will have her decrease her Basaglar to 5 units daily and continue Novolog 1:15 carb ratio. Pt to check her BGs AC/HS and mail her BG logs to me in 6 weeks. BP at goal on no HTN agents. Pt voices understanding and agreement with the plan. RTC 6 months       Follow-up Disposition:  Return in about 6 months (around 2/22/2019).     Copy sent to:  Dr. Camilo Charles

## 2018-08-27 RX ORDER — PEN NEEDLE, DIABETIC 32GX 5/32"
NEEDLE, DISPOSABLE MISCELLANEOUS
Qty: 200 PEN NEEDLE | Refills: 11 | Status: SHIPPED | OUTPATIENT
Start: 2018-08-27 | End: 2018-11-12 | Stop reason: SDUPTHER

## 2018-09-20 ENCOUNTER — OFFICE VISIT (OUTPATIENT)
Dept: PRIMARY CARE CLINIC | Age: 53
End: 2018-09-20

## 2018-09-20 VITALS
HEART RATE: 56 BPM | SYSTOLIC BLOOD PRESSURE: 105 MMHG | TEMPERATURE: 97.9 F | WEIGHT: 132.2 LBS | DIASTOLIC BLOOD PRESSURE: 72 MMHG | OXYGEN SATURATION: 98 % | RESPIRATION RATE: 17 BRPM | HEIGHT: 65 IN | BODY MASS INDEX: 22.02 KG/M2

## 2018-09-20 DIAGNOSIS — H66.91 RIGHT ACUTE OTITIS MEDIA: Primary | ICD-10-CM

## 2018-09-20 RX ORDER — AMOXICILLIN AND CLAVULANATE POTASSIUM 875; 125 MG/1; MG/1
1 TABLET, FILM COATED ORAL EVERY 12 HOURS
Qty: 20 TAB | Refills: 0 | Status: SHIPPED | OUTPATIENT
Start: 2018-09-20 | End: 2018-09-30

## 2018-09-20 NOTE — PROGRESS NOTES
Chief Complaint Patient presents with  Ear Pain Pt c/o R ear pain x 1 day, pt states she has taken ibuprofen to help with discomfort. This note will not be viewable in [address]

## 2018-09-20 NOTE — PATIENT INSTRUCTIONS
Ear Infection (Otitis Media): Care Instructions Your Care Instructions An ear infection may start with a cold and affect the middle ear (otitis media). It can hurt a lot. Most ear infections clear up on their own in a couple of days. Most often you will not need antibiotics. This is because many ear infections are caused by a virus. Antibiotics don't work against a virus. Regular doses of pain medicines are the best way to reduce your fever and help you feel better. Follow-up care is a key part of your treatment and safety. Be sure to make and go to all appointments, and call your doctor if you are having problems. It's also a good idea to know your test results and keep a list of the medicines you take. How can you care for yourself at home? · Take pain medicines exactly as directed. ¨ If the doctor gave you a prescription medicine for pain, take it as prescribed. ¨ If you are not taking a prescription pain medicine, take an over-the-counter medicine, such as acetaminophen (Tylenol), ibuprofen (Advil, Motrin), or naproxen (Aleve). Read and follow all instructions on the label. ¨ Do not take two or more pain medicines at the same time unless the doctor told you to. Many pain medicines have acetaminophen, which is Tylenol. Too much acetaminophen (Tylenol) can be harmful. · Plan to take a full dose of pain reliever before bedtime. Getting enough sleep will help you get better. · Try a warm, moist washcloth on the ear. It may help relieve pain. · If your doctor prescribed antibiotics, take them as directed. Do not stop taking them just because you feel better. You need to take the full course of antibiotics. When should you call for help? Call your doctor now or seek immediate medical care if: 
  · You have new or increasing ear pain.  
  · You have new or increasing pus or blood draining from your ear.  
  · You have a fever with a stiff neck or a severe headache.  Watch closely for changes in your health, and be sure to contact your doctor if: 
  · You have new or worse symptoms.  
  · You are not getting better after taking an antibiotic for 2 days. Where can you learn more? Go to http://padmaja-tiffany.info/. Enter Z076 in the search box to learn more about \"Ear Infection (Otitis Media): Care Instructions. \" Current as of: May 12, 2017 Content Version: 11.7 © 1316-0734 VersionOne[employer]. Care instructions adapted under license by [employer] (which disclaims liability or warranty for this information). If you have questions about a medical condition or this instruction, always ask your healthcare professional. [address] any warranty or liability for your use of this information.

## 2018-09-20 NOTE — MR AVS SNAPSHOT
[address] 
[phone] Patient: Kayla Mcclain MRN: [id number] :1965 Visit Information Date & Time Provider Department Dept. Phone Encounter #  
 2018  1:45 PM Darlene Olsen NP [facility] [facility] [phone] Follow-up Instructions Return if symptoms worsen or fail to improve. Your Appointments 2019  8:30 AM  
Follow Up with Sharmaine Berry MD  
[facility]) Appt Note: 6 month f/u   Diabetes [facility] [employer] [address] [phone] [facility] Upcoming Health Maintenance Date Due  
 EYE EXAM RETINAL OR DILATED Q1 1977 Pneumococcal 19-64 Medium Risk (1 of 1 - PPSV23) 1986 DTaP/Tdap/Td series (1 - Tdap) 1988 PAP AKA CERVICAL CYTOLOGY 1988 FOBT Q 1 YEAR AGE 50-75 2018 Influenza Age 5 to Adult 2018 HEMOGLOBIN A1C Q6M 2019 MICROALBUMIN Q1 5/10/2019 LIPID PANEL Q1 5/10/2019 FOOT EXAM Q1 2019 BREAST CANCER SCRN MAMMOGRAM 2020 Allergies as of 2018  Review Complete On: 2018 By: Darlene Olsen NP Severity Noted Reaction Type Reactions Tree Nut High 2017    Anaphylaxis Not true allergy. Peanut sensitivity via allergy testing Current Immunizations  Never Reviewed No immunizations on file. Not reviewed this visit You Were Diagnosed With   
  
 Codes Comments Right acute otitis media    -  Primary ICD-10-CM: H66.91 
ICD-9-CM: 382. 9 Vitals BP Pulse Temp Resp Height(growth percentile) Weight(growth percentile) 105/72 (BP 1 Location: Left arm, BP Patient Position: Sitting) (!) 56 97.9 °F (36.6 °C) (Oral) 17 5' 5\" (1.651 m) 132 lb 3.2 oz (60 kg) SpO2 BMI OB Status Smoking Status 98% 22 kg/m2 Menopause Never Smoker Vitals History BMI and BSA Data Body Mass Index Body Surface Area  
 22 kg/m 2 1.66 m 2 Preferred Pharmacy Pharmacy Name Phone [phone] [phone] Your Updated Medication List  
  
   
This list is accurate as of 9/20/18  2:19 PM.  Always use your most recent med list.  
  
  
  
  
 acyclovir 200 mg capsule Commonly known as:  ZOVIRAX TAKE ONE CAPSULE BY MOUTH 5 TIMES A DAY  
  
 amoxicillin-clavulanate 875-125 mg per tablet Commonly known as:  AUGMENTIN Take 1 Tab by mouth every twelve (12) hours for 10 days. * glucose blood VI test strips strip Commonly known as:  Eleazarene Salaam Check blood sugars 4 times per day * glucose blood VI test strips strip Commonly known as:  Eleazarene Salaam Check blood sugar up to 4 times per day  
  
 insulin glargine 100 unit/mL (3 mL) Inpn Commonly known as:  BASAGLAR KWIKPEN U-100 INSULIN  
6 units daily * lancets 33 gauge Misc Commonly known as:  BD ULTRA FINE LANCETS Check blood sugar 5 times per day * Lancets Misc Commonly known as:  ONETOUCH ULTRASOFT LANCETS Check blood sugar up to 4 times per day. multivitamin tablet Commonly known as:  ONE A DAY Take 1 Tab by mouth daily. Gauri Pen Needle 32 gauge x 5/32\" Ndle Generic drug:  Insulin Needles (Disposable) 4 shots daily NovoLOG Flexpen U-100 Insulin 100 unit/mL Inpn Generic drug:  insulin aspart U-100 Inject 2 units up to 3 times daily with higher carb meal.  Max 50 units per day--replaces humalog * Notice: This list has 4 medication(s) that are the same as other medications prescribed for you. Read the directions carefully, and ask your doctor or other care provider to review them with you. Prescriptions Sent to Pharmacy Refills  
 amoxicillin-clavulanate (AUGMENTIN) 875-125 mg per tablet 0 Sig: Take 1 Tab by mouth every twelve (12) hours for 10 days. Class: Normal  
 Pharmacy: [facility] , [address] [place] [address] #: [phone] Route: Oral  
  
Follow-up Instructions Return if symptoms worsen or fail to improve. Patient Instructions Ear Infection (Otitis Media): Care Instructions Your Care Instructions An ear infection may start with a cold and affect the middle ear (otitis media). It can hurt a lot. Most ear infections clear up on their own in a couple of days. Most often you will not need antibiotics. This is because many ear infections are caused by a virus. Antibiotics don't work against a virus. Regular doses of pain medicines are the best way to reduce your fever and help you feel better. Follow-up care is a key part of your treatment and safety. Be sure to make and go to all appointments, and call your doctor if you are having problems. It's also a good idea to know your test results and keep a list of the medicines you take. How can you care for yourself at home? · Take pain medicines exactly as directed. ¨ If the doctor gave you a prescription medicine for pain, take it as prescribed. ¨ If you are not taking a prescription pain medicine, take an over-the-counter medicine, such as acetaminophen (Tylenol), ibuprofen (Advil, Motrin), or naproxen (Aleve). Read and follow all instructions on the label. ¨ Do not take two or more pain medicines at the same time unless the doctor told you to. Many pain medicines have acetaminophen, which is Tylenol. Too much acetaminophen (Tylenol) can be harmful. · Plan to take a full dose of pain reliever before bedtime. Getting enough sleep will help you get better. · Try a warm, moist washcloth on the ear. It may help relieve pain. · If your doctor prescribed antibiotics, take them as directed. Do not stop taking them just because you feel better. You need to take the full course of antibiotics. When should you call for help? Call your doctor now or seek immediate medical care if: 
  · You have new or increasing ear pain.  
  · You have new or increasing pus or blood draining from your ear.  
  · You have a fever with a stiff neck or a severe headache.  
 Watch closely for changes in your health, and be sure to contact your doctor if: 
  · You have new or worse symptoms.  
  · You are not getting better after taking an antibiotic for 2 days. Where can you learn more? Go to http://padmaja-tiffany.info/. Enter N408 in the search box to learn more about \"Ear Infection (Otitis Media): Care Instructions. \" Current as of: May 12, 2017 Content Version: 11.7 © 9433-5287 [employer]. Care instructions adapted under license by [employer] (which disclaims liability or warranty for this information). If you have questions about a medical condition or this instruction, always ask your healthcare professional. [address] any warranty or liability for your use of this information. Introducing [facility]! Dear Fritz Waller: Thank you for requesting a [facility] account. Our records indicate that you already have an active [facility] account. You can access your account anytime at [email or web address] Did you know that you can access your hospital and ER discharge instructions at any time in [facility]? You can also review all of your test results from your hospital stay or ER visit. Additional Information If you have questions, please visit the Frequently Asked Questions section of the [employer] website at [email or web address] Remember, [facility] is NOT to be used for urgent needs. For medical emergencies, dial 911. Now available from your iPhone and Android! Please provide this summary of care documentation to your next provider. Your primary care clinician is listed as Adrienne Willett. If you have any questions after today's visit, please call [phone].

## 2018-09-20 NOTE — PROGRESS NOTES
Subjective:  
Jorge Luis Dimas is a 50 y.o. female who complains of right ear pain for 1 day, gradually worsening since that time. Pt awoke at 4am this morning with intense right ear pain radiating to right upper teeth and right nose. Recently has had some mild headache over eyes and sinus pressure. Using nasal sinus rinse and Flonase. Denies any fevers or chills. Taking motrin for pain. She denies a history of shortness of breath and wheezing. Evaluation to date: none. Treatment to date: nasal steroids, OTC products. Patient does not smoke cigarettes. Relevant PMH:  
Past Medical History:  
Diagnosis Date  Diabetes ([facility] [address] 2017 Type 1 diabetes  Menopause Past Surgical History:  
Procedure Laterality Date  HX  SECTION Bilateral   
 IMPLANT BREAST SILICONE/EQ Bilateral   
  Allergies Allergen Reactions  Tree Nut Anaphylaxis Not true allergy. Peanut sensitivity via allergy testing Review of Systems Pertinent items are noted in HPI. Objective:  
 
Visit Vitals  /72 (BP 1 Location: Left arm, BP Patient Position: Sitting)  Pulse (!) 56  Temp 97.9 °F (36.6 °C) (Oral)  Resp 17  Ht 5' 5\" (1.651 m)  Wt 132 lb 3.2 oz (60 kg)  SpO2 98%  BMI 22 kg/m2 General:  alert, cooperative, no distress Eyes: negative Ears: abnormal TM AD - erythematous, dull, bulging, serous middle ear fluid Sinuses: Normal paranasal sinuses without tenderness Mouth:  Lips, mucosa, and tongue normal. Teeth and gums normal and normal findings: oropharynx pink & moist without lesions or evidence of thrush Neck: supple, symmetrical, trachea midline and no adenopathy. Heart: S1 and S2 normal, no murmurs noted. Lungs: clear to auscultation bilaterally Assessment/Plan: ICD-10-CM ICD-9-CM 1. Right acute otitis media H66.91 382.9 Orders Placed This Encounter  amoxicillin-clavulanate (AUGMENTIN) 875-125 mg per tablet Suggested symptomatic OTC remedies. Nasal saline sprays for congestion. Antibiotics per orders. RTC prn. Ruby Castellon NP This note will not be viewable in [address]

## 2018-10-17 ENCOUNTER — OFFICE VISIT (OUTPATIENT)
Dept: PRIMARY CARE CLINIC | Age: 53
End: 2018-10-17

## 2018-10-17 VITALS
HEIGHT: 65 IN | RESPIRATION RATE: 16 BRPM | WEIGHT: 129 LBS | TEMPERATURE: 99.5 F | DIASTOLIC BLOOD PRESSURE: 77 MMHG | HEART RATE: 110 BPM | BODY MASS INDEX: 21.49 KG/M2 | SYSTOLIC BLOOD PRESSURE: 110 MMHG | OXYGEN SATURATION: 96 %

## 2018-10-17 DIAGNOSIS — J03.90 ACUTE TONSILLITIS, UNSPECIFIED ETIOLOGY: Primary | ICD-10-CM

## 2018-10-17 DIAGNOSIS — R68.89 FLU-LIKE SYMPTOMS: ICD-10-CM

## 2018-10-17 DIAGNOSIS — H66.91 ACUTE RIGHT OTITIS MEDIA: ICD-10-CM

## 2018-10-17 LAB
QUICKVUE INFLUENZA TEST: NEGATIVE
S PYO AG THROAT QL: NEGATIVE
VALID INTERNAL CONTROL?: YES
VALID INTERNAL CONTROL?: YES

## 2018-10-17 RX ORDER — CEFDINIR 300 MG/1
300 CAPSULE ORAL 2 TIMES DAILY
Qty: 20 CAP | Refills: 0 | Status: SHIPPED | OUTPATIENT
Start: 2018-10-17 | End: 2018-10-27

## 2018-10-17 NOTE — PATIENT INSTRUCTIONS
Ear Infection (Otitis Media): Care Instructions Your Care Instructions An ear infection may start with a cold and affect the middle ear (otitis media). It can hurt a lot. Most ear infections clear up on their own in a couple of days. Most often you will not need antibiotics. This is because many ear infections are caused by a virus. Antibiotics don't work against a virus. Regular doses of pain medicines are the best way to reduce your fever and help you feel better. Follow-up care is a key part of your treatment and safety. Be sure to make and go to all appointments, and call your doctor if you are having problems. It's also a good idea to know your test results and keep a list of the medicines you take. How can you care for yourself at home? · Take pain medicines exactly as directed. ? If the doctor gave you a prescription medicine for pain, take it as prescribed. ? If you are not taking a prescription pain medicine, take an over-the-counter medicine, such as acetaminophen (Tylenol), ibuprofen (Advil, Motrin), or naproxen (Aleve). Read and follow all instructions on the label. ? Do not take two or more pain medicines at the same time unless the doctor told you to. Many pain medicines have acetaminophen, which is Tylenol. Too much acetaminophen (Tylenol) can be harmful. · Plan to take a full dose of pain reliever before bedtime. Getting enough sleep will help you get better. · Try a warm, moist washcloth on the ear. It may help relieve pain. · If your doctor prescribed antibiotics, take them as directed. Do not stop taking them just because you feel better. You need to take the full course of antibiotics. When should you call for help? Call your doctor now or seek immediate medical care if: 
  · You have new or increasing ear pain.  
  · You have new or increasing pus or blood draining from your ear.  
  · You have a fever with a stiff neck or a severe headache.  Watch closely for changes in your health, and be sure to contact your doctor if: 
  · You have new or worse symptoms.  
  · You are not getting better after taking an antibiotic for 2 days. Where can you learn more? Go to http://padmaja-tiffany.info/. Enter Z153 in the search box to learn more about \"Ear Infection (Otitis Media): Care Instructions. \" Current as of: March 28, 2018 Content Version: 11.8 © 4318-6816 [employer]. Care instructions adapted under license by [employer] (which disclaims liability or warranty for this information). If you have questions about a medical condition or this instruction, always ask your healthcare professional. [place] [address] any warranty or liability for your use of this information. Tonsillitis: Care Instructions Your Care Instructions Tonsillitis is an infection of the tonsils that is caused by bacteria or a virus. The tonsils are in the back of the throat and are part of the immune system. Tonsillitis typically lasts from a few days up to a couple of weeks. Tonsillitis caused by a virus goes away on its own. Tonsillitis caused by the bacteria that causes strep throat is treated with antibiotics. You and your doctor may consider surgery to remove the tonsils (tonsillectomy) if you have serious complications or repeat infections. Follow-up care is a key part of your treatment and safety. Be sure to make and go to all appointments, and call your doctor if you are having problems. It's also a good idea to know your test results and keep a list of the medicines you take. How can you care for yourself at home? · If your doctor prescribed antibiotics, take them as directed. Do not stop taking them just because you feel better. You need to take the full course of antibiotics. · Gargle with warm salt water.  This helps reduce swelling and relieve discomfort. Gargle once an hour with 1 teaspoon of salt mixed in 8 fluid ounces of warm water. · Take an over-the-counter pain medicine, such as acetaminophen (Tylenol), ibuprofen (Advil, Motrin), or naproxen (Aleve). Be safe with medicines. Read and follow all instructions on the label. No one younger than 20 should take aspirin. It has been linked to Reye syndrome, a serious illness. · Be careful when taking over-the-counter cold or flu medicines and Tylenol at the same time. Many of these medicines have acetaminophen, which is Tylenol. Read the labels to make sure that you are not taking more than the recommended dose. Too much acetaminophen (Tylenol) can be harmful. · Try an over-the-counter throat spray to relieve throat pain. · Drink plenty of fluids. Fluids may help soothe an irritated throat. Drink warm or cool liquids (whichever feels better). These include tea, soup, and juice. · Do not smoke, and avoid secondhand smoke. Smoking can make tonsillitis worse. If you need help quitting, talk to your doctor about stop-smoking programs and medicines. These can increase your chances of quitting for good. · Use a vaporizer or humidifier to add moisture to your bedroom. Follow the directions for cleaning the machine. When should you call for help? Call your doctor now or seek immediate medical care if: 
  · Your pain gets worse on one side of your throat.  
  · You have a new or higher fever.  
  · You notice changes in your voice.  
  · You have trouble opening your mouth.  
  · You have any trouble breathing.  
  · You have much more trouble swallowing.  
  · You have a fever with a stiff neck or a severe headache.  
  · You are sensitive to light or feel very sleepy or confused.  
 Watch closely for changes in your health, and be sure to contact your doctor if: 
  · You do not get better after 2 days. Where can you learn more? Go to http://padmaja-tiffany.info/. Enter C224 in the search box to learn more about \"Tonsillitis: Care Instructions. \" Current as of: March 28, 2018 Content Version: 11.8 © 8400-2686 [employer]. Care instructions adapted under license by [employer] (which disclaims liability or warranty for this information). If you have questions about a medical condition or this instruction, always ask your healthcare professional. [place] [address] any warranty or liability for your use of this information.

## 2018-10-17 NOTE — PROGRESS NOTES
Chief Complaint Patient presents with  Sore Throat Pt c/o sore throatt, headache and low grade feer since yesterday. Pt has taken tylenol with some relief.

## 2018-10-17 NOTE — PROGRESS NOTES
Subjective:  
Vida Villa is a 50 y.o. female who complains of sneezing, headache and chills for 1 day, gradually worsening since that time. Symptoms started suddenly last night with chills and sweats. Denies any congestion, drainage, cough, V/D. Does have mild nausea. Denies any ear pain. Taking fluids OK but no food today. Just sips of water today. Taking tylenol. Pt was treated for R AOM on  with Augmentin. Symptoms resolved but had excruciating ear pain w/ flying shortly thereafter. Denies any ear pain today. 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 No flu shot yet. Her daughter accompanies her today. She denies a history of shortness of breath and wheezing. Evaluation to date: none. Treatment to date: OTC products. Patient does not smoke cigarettes. Relevant PMH:  
 
Allergies Allergen Reactions  Tree Nut Anaphylaxis Not true allergy. Peanut sensitivity via allergy testing Past Medical History:  
Diagnosis Date  Diabetes ([facility] [address] 2017 Type 1 diabetes  Menopause Past Surgical History:  
Procedure Laterality Date  HX  SECTION Bilateral   
 IMPLANT BREAST SILICONE/EQ Bilateral   
 2008 Review of Systems Pertinent items are noted in HPI. Objective:  
 
Visit Vitals /77 Pulse (!) 110 Temp 99.5 °F (37.5 °C) (Oral) Resp 16 Ht 5' 5\" (1.651 m) Wt 129 lb (58.5 kg) SpO2 96% BMI 21.47 kg/m² General:  alert, cooperative, appears acutely ill but not toxic Eyes: negative Ears: Right TM: erythematous and bulging. Left TM: normal  
Sinuses: Normal paranasal sinuses without tenderness Mouth:  Oropharynx with moderate erythema Neck: supple, symmetrical, trachea midline and mild bilateral anterior cervical adenopathy. Heart: S1 and S2 normal, no murmurs noted. Lungs: clear to auscultation bilaterally Abdomen: soft, non-tender. Bowel sounds normal. No masses,  no organomegaly Results for orders placed or performed in visit on 10/17/18 UPPER RESPIRATORY CULTURE Result Value Ref Range Upper Respiratory Culture Routine respiratory richie Moderate growth SPECIMEN STATUS REPORT Result Value Ref Range SPECIMEN STATUS REPORT COMMENT   
AMB POC RAPID INFLUENZA TEST Result Value Ref Range VALID INTERNAL CONTROL POC Yes QuickVue Influenza test Negative Negative AMB POC RAPID STREP A Result Value Ref Range VALID INTERNAL CONTROL POC Yes Group A Strep Ag Negative Negative Assessment/Plan: ICD-10-CM ICD-9-CM 1. Acute tonsillitis, unspecified etiology J03.90 463 AMB POC RAPID STREP A AEROBIC BACTERIAL CULTURE 2. Acute right otitis media H66.91 382.9 3. Flu-like symptoms R68.89 780.99 AMB POC RAPID INFLUENZA TEST Orders Placed This Encounter  UPPER RESPIRATORY CULTURE  
 AEROBIC BACTERIAL CULTURE  
 SPECIMEN STATUS REPORT  
 AMB POC RAPID INFLUENZA TEST  AMB POC RAPID STREP A  
 cefdinir (OMNICEF) 300 mg capsule Precautions given. Antibiotics as prescribed. F/u here or with PCP prn. Kimberly Sic, NP This note will not be viewable in [address]

## 2018-10-21 LAB
BACTERIA SPEC RESP CULT: NORMAL
SPECIMEN STATUS REPORT, ROLRST: NORMAL

## 2018-10-21 NOTE — PROGRESS NOTES
Please inform pt strep culture shows routine respiratory richie, no strep. How is she feeling? I recommend she continue the antibiotics prescribed for her tonsillitis and ear infection. Thanks.

## 2018-10-22 NOTE — PROGRESS NOTES
Called and spoke with patient, verified name and date of birth, advised that strep culture shows routine respiratory, no strep, pt advised that symptoms have improved.  I advised her to finish all antibiotics that was given for her tonsillitis and ear infection, she verbalized understanding of all information, appreciative of phone call and had no further questions at this time

## 2018-11-12 DIAGNOSIS — E10.9 TYPE 1 DIABETES MELLITUS WITHOUT COMPLICATION (HCC): ICD-10-CM

## 2018-11-12 RX ORDER — INSULIN GLARGINE 100 [IU]/ML
INJECTION, SOLUTION SUBCUTANEOUS
Qty: 15 ML | Refills: 3 | Status: SHIPPED | OUTPATIENT
Start: 2018-11-12 | End: 2020-02-20 | Stop reason: SDUPTHER

## 2018-11-12 RX ORDER — PEN NEEDLE, DIABETIC 32GX 5/32"
NEEDLE, DISPOSABLE MISCELLANEOUS
Qty: 200 PEN NEEDLE | Refills: 11 | Status: SHIPPED | OUTPATIENT
Start: 2018-11-12 | End: 2019-08-22 | Stop reason: SDUPTHER

## 2018-11-12 RX ORDER — LANCETS
EACH MISCELLANEOUS
Qty: 200 EACH | Refills: 11 | Status: SHIPPED | OUTPATIENT
Start: 2018-11-12 | End: 2020-03-02 | Stop reason: SDUPTHER

## 2018-11-13 RX ORDER — INSULIN ASPART 100 [IU]/ML
INJECTION, SOLUTION INTRAVENOUS; SUBCUTANEOUS
Qty: 15 ML | Refills: 3 | Status: SHIPPED | OUTPATIENT
Start: 2018-11-13 | End: 2019-06-12 | Stop reason: SDUPTHER

## 2018-11-14 RX ORDER — LANCETS 33 GAUGE
EACH MISCELLANEOUS
Qty: 150 LANCET | Refills: 11 | Status: SHIPPED | OUTPATIENT
Start: 2018-11-14 | End: 2020-01-30 | Stop reason: SDUPTHER

## 2018-11-14 NOTE — TELEPHONE ENCOUNTER
A review was sent to [facility] for the wrong lancets. My machine requires the One Touch Delica. I will return the OT ultra soft lancets. Thanks!    Huan Gonzalez

## 2018-11-15 RX ORDER — LANCETS 33 GAUGE
EACH MISCELLANEOUS
Qty: 150 LANCET | Refills: 11 | Status: SHIPPED | OUTPATIENT
Start: 2018-11-15 | End: 2020-02-20 | Stop reason: SDUPTHER

## 2019-01-16 ENCOUNTER — TELEPHONE (OUTPATIENT)
Dept: ENDOCRINOLOGY | Age: 54
End: 2019-01-16

## 2019-01-16 DIAGNOSIS — E10.9 TYPE 1 DIABETES MELLITUS WITHOUT COMPLICATION (HCC): Primary | ICD-10-CM

## 2019-01-16 NOTE — TELEPHONE ENCOUNTER
Spoke with pt regarding her request about starting an insulin pump. I gave her the [facility] for [facility] and I places a referral for DM education and pump training.

## 2019-01-16 NOTE — TELEPHONE ENCOUNTER
Pt called she wants to know who she needs to talk to discuss about an insulin pump. She can be reach @ [phone].

## 2019-01-30 ENCOUNTER — TELEPHONE (OUTPATIENT)
Dept: DIABETES SERVICES | Age: 54
End: 2019-01-30

## 2019-01-31 NOTE — TELEPHONE ENCOUNTER
Spoke with patient. She states that she wants insulin pump initiation/training and she does not think that a registered dietician would know about the pumps. Advised patient to call her insurance again and explain that she desires pump initiation/ training and ask if the [facility] is covered. Patient expressed understanding.

## 2019-02-21 ENCOUNTER — OFFICE VISIT (OUTPATIENT)
Dept: ENDOCRINOLOGY | Age: 54
End: 2019-02-21

## 2019-02-21 VITALS
HEART RATE: 74 BPM | SYSTOLIC BLOOD PRESSURE: 108 MMHG | DIASTOLIC BLOOD PRESSURE: 80 MMHG | WEIGHT: 128.8 LBS | HEIGHT: 65 IN | BODY MASS INDEX: 21.46 KG/M2

## 2019-02-21 DIAGNOSIS — E10.9 TYPE 1 DIABETES MELLITUS WITHOUT COMPLICATION (HCC): Primary | ICD-10-CM

## 2019-02-21 LAB — HBA1C MFR BLD HPLC: 5.2 %

## 2019-02-21 RX ORDER — BLOOD-GLUCOSE METER
EACH MISCELLANEOUS
Qty: 1 EACH | Refills: 0 | Status: SHIPPED | OUTPATIENT
Start: 2019-02-21

## 2019-02-21 NOTE — PROGRESS NOTES
Chief Complaint Patient presents with  Diabetes  
  pcp and pharmacy verified. Eye exam: due Records since last visit reviewed History of Present Illness: Pita Gavin is a 48 y.o. female here for follow up of diabetes. Pt notes that over [address] [place] day weekend (2017)  she \"came down with a Virus and sinus infection\" She was put on Abx and she noted polyuria, polydipsia and blurred vision. She had also lost 10 pounds in a short period of time. She saw her eye doctor who noted swelling in her cornia (will request these records). The swelling improved but did not normalize, her eye doctor recommended she get labs drawn and her PCP cecilia labs, her BG was 300 and her PCP started her on Glipizide. She continued to have symptoms so she checked her BG on a glucometer said \"high\" so she went to the ED and her BG >600. Her A1C was 13.1%. AB testing showed positive [place] and CRP. At our initial visit in August 2017 I tested her for autoimmune DM Her insulin, proinsulin and c-peptide were low normal and one of her 4 antibodies were positive. (ZN-8). This fits with Auto-immune DM Type 1/NII. Her A1C today was 5.2%. Pt notes that in the fall she had been ill and this negatively impacted her work-out regimen. She notes that he has been adding back some fruit into her diet. She notes that she does have some FBGs in the 110-115 since our last visit, which she feels is due to the addition of fruit. She notes that she not been getting low BGs any longer. She will occasionally take her insulin for a meal, then get too busy to eat. She is still taking Basaglar 7 units daily (she tried the decrease to 5 units caused her BGs to be too high) and Novolog 1:15 carb ratio. Pt notes she does not eat three meals, but tends to \"graze\" during the day. She eats every 2 hours and will cover her carbs as needed. She will have dinner around 5-6PM. Exercise consists of treadmill/cardio for 30 minutes most mornings. No history of vascular disease. No history of neuropathy, or nephropathy. Last eye exam was January 2018, no retinopathy. She notes her vision has improved. Current Outpatient Medications Medication Sig  Blood-Glucose Meter (ACCU-CHEK INDIO PLUS METER) misc Check sugars 4 times per day  glucose blood VI test strips (ACCU-CHEK INDIO PLUS TEST STRP) strip Check sugars 4 times per day  insulin glargine (BASAGLAR KWIKPEN U-100 INSULIN) 100 unit/mL (3 mL) inpn INJECT 5 UNITS SUBCUTANEOUSLY EVERY DAY  lancets (ONE TOUCH DELICA) 33 gauge misc CHECK BLOOD SUGAR 5 TIMES PER DAY  lancets (ONE TOUCH DELICA) 33 gauge misc Check blood sugar 5 times per day.  NOVOLOG FLEXPEN U-100 INSULIN 100 unit/mL inpn Inject 2 units up to 3 times daily with higher carb meal.  Max 50 units per day--replaces humalog (Patient taking differently: Inject 2-10 units up to 3 times daily with higher carb meal.  Max 50 units per day--replaces humalog)  insulin glargine (BASAGLAR KWIKPEN U-100 INSULIN) 100 unit/mL (3 mL) inpn 6 units daily (Patient taking differently: 7 units daily)  Lancets (ONETOUCH ULTRASOFT LANCETS) misc Check blood sugar up to 4 times per day.  HYUN PEN NEEDLE 32 gauge x 5/32\" ndle 4 shots daily  acyclovir (ZOVIRAX) 200 mg capsule TAKE ONE CAPSULE BY MOUTH 5 TIMES A DAY  lancets (BD ULTRA FINE LANCETS) 33 gauge misc Check blood sugar 5 times per day  multivitamin (ONE A DAY) tablet Take 1 Tab by mouth daily. No current facility-administered medications for this visit. Allergies Allergen Reactions  Tree Nut Anaphylaxis Not true allergy. Peanut sensitivity via allergy testing Review of Systems: - Eyes: no blurry vision or double vision - Cardiovascular: no chest pain - Respiratory: no shortness of breath - Musculoskeletal: no myalgias - Neurological: no numbness/tingling in extremities Physical Examination: 
Blood pressure 108/80, pulse 74, height 5' 5\" (1.651 m), weight 128 lb 12.8 oz (58.4 kg). - General: pleasant, no distress, good eye contact  
- Neck: no carotid bruits - Cardiovascular: regular, normal rate, nl s1 and s2, no m/r/g, 2+ DP pulses - Respiratory: clear bilaterally - Integumentary: no edema, no foot ulcers,  
- Psychiatric: normal mood and affect Diabetic foot exam:  
 
Left Foot: 
 Visual Exam: normal  
 Pulse DP: 2+ (normal) Filament test: normal sensation Vibratory sensation: normal 
   
Right Foot: 
 Visual Exam: normal  
 Pulse DP: 2+ (normal) Filament test: normal sensation Vibratory sensation: normal 
 
 
 
Data Reviewed:  
Her A1C today was 5.2%. Assessment/Plan:  
1) DM > Her A1C today was 5.2%. Pt to continue the Basaglar to 7 units daily and continue Novolog 1:15 carb ratio. We discussed insulin pump therapy, but she wants to think about it and wants to wait till the summer. Pt to check her BGs AC/HS (4 times per day) and mail her BG logs to me in 6 weeks. BP at goal on no HTN agents. Pt voices understanding and agreement with the plan. RTC 3 months Follow-up Disposition: 
Return in about 3 months (around 5/21/2019). Copy sent to: 
Dr. Carlos Cabrera

## 2019-02-22 LAB
ALBUMIN/CREAT UR: 7.8 MG/G CREAT (ref 0–30)
CREAT UR-MCNC: 106.1 MG/DL
MICROALBUMIN UR-MCNC: 8.3 UG/ML

## 2019-05-17 ENCOUNTER — HOSPITAL ENCOUNTER (OUTPATIENT)
Dept: MAMMOGRAPHY | Age: 54
Discharge: HOME | End: 2019-05-17
Attending: OBSTETRICS & GYNECOLOGY
Payer: COMMERCIAL

## 2019-05-17 DIAGNOSIS — Z12.39 SCREENING BREAST EXAMINATION: ICD-10-CM

## 2019-05-17 PROCEDURE — 77067 SCR MAMMO BI INCL CAD: CPT

## 2019-05-28 ENCOUNTER — OFFICE VISIT (OUTPATIENT)
Dept: ENDOCRINOLOGY | Age: 54
End: 2019-05-28

## 2019-05-28 VITALS
BODY MASS INDEX: 22.09 KG/M2 | WEIGHT: 132.6 LBS | SYSTOLIC BLOOD PRESSURE: 107 MMHG | HEIGHT: 65 IN | DIASTOLIC BLOOD PRESSURE: 73 MMHG | HEART RATE: 67 BPM

## 2019-05-28 DIAGNOSIS — E10.9 TYPE 1 DIABETES MELLITUS WITHOUT COMPLICATION (HCC): Primary | ICD-10-CM

## 2019-05-28 LAB — HBA1C MFR BLD HPLC: 5.4 %

## 2019-05-28 NOTE — PROGRESS NOTES
Chief Complaint   Patient presents with    Diabetes     pcp and pharmacy verified. Release signed for eye exam   Records since last visit reviewed  History of Present Illness: Jne Diaz is a 48 y.o. female here for follow up of diabetes. Pt notes that over [address] [facility] [place] day weekend (2017)  she \"came down with a Virus and sinus infection\" She was put on Abx and she noted polyuria, polydipsia and blurred vision. She had also lost 10 pounds in a short period of time. She saw her eye doctor who noted swelling in her cornia (will request these records). The swelling improved but did not normalize, her eye doctor recommended she get labs drawn and her PCP cecilia labs, her BG was 300 and her PCP started her on Glipizide. She continued to have symptoms so she checked her BG on a glucometer said \"high\" so she went to the ED and her BG >600. Her A1C was 13.1%. AB testing showed positive [place] and CRP. At our initial visit in August 2017 I tested her for autoimmune DM   Her insulin, proinsulin and c-peptide were low normal and one of her 4 antibodies were positive. (ZN-8). This fits with Auto-immune DM Type 1/NII. At our last visit in February 2019 her A1C was 5.2% on Basaglar 7 units daily (she tried the decrease to 5 units caused her BGs to be too high) and Novolog 1:15 carb ratio. We discussed insulin pump therapy, but pt wanted to think about it for a while. Her A1C today was 5.4%. She denies any issus of injuries, illnesses or hospitalizations. She is still taking Basaglar 7 units daily and Novolog 1:15 carb ratio. Pt notes she does not eat three meals, but tends to \"graze\" during the day. She eats every 2 hours and will cover her carbs as needed. She will have dinner around 5-6PM.    Exercise consists of treadmill/cardio for 30 minutes most mornings. No history of vascular disease. No history of neuropathy, or nephropathy. Last eye exam was April 2019, no retinopathy.  Will request these records. Pt has a BCC that needs to be removed from her forehead. She has had them before and removed but \"that was before I was a type 1 DM. \"    Current Outpatient Medications   Medication Sig    Blood-Glucose Meter (ACCU-CHEK INDIO PLUS METER) misc Check sugars 4 times per day    glucose blood VI test strips (ACCU-CHEK INDIO PLUS TEST STRP) strip Check sugars 4 times per day    insulin glargine (BASAGLAR KWIKPEN U-100 INSULIN) 100 unit/mL (3 mL) inpn INJECT 5 UNITS SUBCUTANEOUSLY EVERY DAY    lancets (ONE TOUCH DELICA) 33 gauge misc CHECK BLOOD SUGAR 5 TIMES PER DAY    NOVOLOG FLEXPEN U-100 INSULIN 100 unit/mL inpn Inject 2 units up to 3 times daily with higher carb meal.  Max 50 units per day--replaces humalog (Patient taking differently: Inject 2-10 units up to 3 times daily with higher carb meal.  Max 50 units per day--replaces humalog)    insulin glargine (BASAGLAR KWIKPEN U-100 INSULIN) 100 unit/mL (3 mL) inpn 6 units daily (Patient taking differently: 7 units daily)    Lancets (ONETOUCH ULTRASOFT LANCETS) misc Check blood sugar up to 4 times per day.  HYUN PEN NEEDLE 32 gauge x 5/32\" ndle 4 shots daily    acyclovir (ZOVIRAX) 200 mg capsule TAKE ONE CAPSULE BY MOUTH 5 TIMES A DAY    lancets (BD ULTRA FINE LANCETS) 33 gauge misc Check blood sugar 5 times per day    multivitamin (ONE A DAY) tablet Take 1 Tab by mouth daily.  lancets (ONE TOUCH DELICA) 33 gauge misc Check blood sugar 5 times per day. No current facility-administered medications for this visit. Allergies   Allergen Reactions    Tree Nut Anaphylaxis     Not true allergy.  Peanut sensitivity via allergy testing     Review of Systems:  - Eyes: no blurry vision or double vision  - Cardiovascular: no chest pain  - Respiratory: no shortness of breath  - Musculoskeletal: no myalgias  - Neurological: no numbness/tingling in extremities    Physical Examination:  Blood pressure 107/73, pulse 67, height 5' 5\" (1.651 m), weight 132 lb 9.6 oz (60.1 kg). - General: pleasant, no distress, good eye contact   - Neck: no carotid bruits  - Cardiovascular: regular, normal rate, nl s1 and s2, no m/r/g, 2+ DP pulses   - Respiratory: clear bilaterally  - Integumentary: no edema, no foot ulcers,   - Psychiatric: normal mood and affect    Diabetic foot exam:     Left Foot:   Visual Exam: normal    Pulse DP: 2+ (normal)   Filament test: normal sensation    Vibratory sensation: normal      Right Foot:   Visual Exam: normal    Pulse DP: 2+ (normal)   Filament test: normal sensation    Vibratory sensation: normal        Data Reviewed:   Her A1C today was 5.4%. Assessment/Plan:   1) DM > Her A1C today was 5.4%. Pt to continue the Basaglar to 7 units daily and continue Novolog 1:15 carb ratio. We discussed insulin pump therapy, but she would prefer to continue her current regimen. She is controlling her BGs very well with this regimen and I agree with her. Pt to check her BGs AC/HS (4 times per day) and mail her BG logs to me in 6 weeks. BP at goal on no HTN agents. Pt voices understanding and agreement with the plan. RTC 3 months       Follow-up and Dispositions    · Return in about 3 months (around 8/28/2019).          Copy sent to:  Dr. Sheba Stein

## 2019-07-09 LAB
ALBUMIN SERPL-MCNC: 4.2 G/DL (ref 3.5–5.5)
ALBUMIN/GLOB SERPL: 1.8 {RATIO} (ref 1.2–2.2)
ALP SERPL-CCNC: 62 IU/L (ref 39–117)
ALT SERPL-CCNC: 19 IU/L (ref 0–32)
AST SERPL-CCNC: 20 IU/L (ref 0–40)
BILIRUB SERPL-MCNC: 0.5 MG/DL (ref 0–1.2)
BUN SERPL-MCNC: 7 MG/DL (ref 6–24)
BUN/CREAT SERPL: 8 (ref 9–23)
CALCIUM SERPL-MCNC: 9.1 MG/DL (ref 8.7–10.2)
CHLORIDE SERPL-SCNC: 101 MMOL/L (ref 96–106)
CHOLEST SERPL-MCNC: 198 MG/DL (ref 100–199)
CO2 SERPL-SCNC: 24 MMOL/L (ref 20–29)
CREAT SERPL-MCNC: 0.87 MG/DL (ref 0.57–1)
EST. AVERAGE GLUCOSE BLD GHB EST-MCNC: 126 MG/DL
GLOBULIN SER CALC-MCNC: 2.4 G/DL (ref 1.5–4.5)
GLUCOSE SERPL-MCNC: 128 MG/DL (ref 65–99)
HBA1C MFR BLD: 6 % (ref 4.8–5.6)
HDLC SERPL-MCNC: 100 MG/DL
INTERPRETATION, 910389: NORMAL
LDLC SERPL CALC-MCNC: 87 MG/DL (ref 0–99)
POTASSIUM SERPL-SCNC: 4.4 MMOL/L (ref 3.5–5.2)
PROT SERPL-MCNC: 6.6 G/DL (ref 6–8.5)
SODIUM SERPL-SCNC: 139 MMOL/L (ref 134–144)
TRIGL SERPL-MCNC: 53 MG/DL (ref 0–149)
VLDLC SERPL CALC-MCNC: 11 MG/DL (ref 5–40)

## 2019-08-22 ENCOUNTER — OFFICE VISIT (OUTPATIENT)
Dept: ENDOCRINOLOGY | Age: 54
End: 2019-08-22

## 2019-08-22 VITALS
HEART RATE: 71 BPM | HEIGHT: 65 IN | WEIGHT: 129 LBS | DIASTOLIC BLOOD PRESSURE: 76 MMHG | SYSTOLIC BLOOD PRESSURE: 115 MMHG | BODY MASS INDEX: 21.49 KG/M2

## 2019-08-22 DIAGNOSIS — E10.9 TYPE 1 DIABETES MELLITUS WITHOUT COMPLICATION (HCC): Primary | ICD-10-CM

## 2019-08-22 RX ORDER — PEN NEEDLE, DIABETIC 32GX 5/32"
NEEDLE, DISPOSABLE MISCELLANEOUS
Qty: 200 PEN NEEDLE | Refills: 11 | Status: SHIPPED | OUTPATIENT
Start: 2019-08-22 | End: 2020-03-12 | Stop reason: SDUPTHER

## 2019-08-22 RX ORDER — INSULIN GLARGINE 100 [IU]/ML
INJECTION, SOLUTION SUBCUTANEOUS
Qty: 15 ML | Refills: 3 | Status: SHIPPED | OUTPATIENT
Start: 2019-08-22 | End: 2019-12-17 | Stop reason: SDUPTHER

## 2019-08-22 RX ORDER — INSULIN ASPART 100 [IU]/ML
INJECTION, SOLUTION INTRAVENOUS; SUBCUTANEOUS
Qty: 15 ML | Refills: 6 | Status: SHIPPED | OUTPATIENT
Start: 2019-08-22 | End: 2020-02-20 | Stop reason: SDUPTHER

## 2019-08-22 NOTE — PROGRESS NOTES
Chief Complaint   Patient presents with    Diabetes     pcp and pharmacy verified   Records since last visit reviewed  History of Present Illness: Valarie Pallas is a 48 y.o. female here for follow up of diabetes. Pt notes that over [address] [facility] [place] day weekend (2017)  she \"came down with a Virus and sinus infection\" She was put on Abx and she noted polyuria, polydipsia and blurred vision. She had also lost 10 pounds in a short period of time. She saw her eye doctor who noted swelling in her cornia (will request these records). The swelling improved but did not normalize, her eye doctor recommended she get labs drawn and her PCP cecilia labs, her BG was 300 and her PCP started her on Glipizide. She continued to have symptoms so she checked her BG on a glucometer said \"high\" so she went to the ED and her BG >600. Her A1C was 13.1%. AB testing showed positive [place] and CRP. At our initial visit in August 2017 I tested her for autoimmune DM   Her insulin, proinsulin and c-peptide were low normal and one of her 4 antibodies were positive. (ZN-8). This fits with Auto-immune DM Type 1/NII. Her A1C in July was 6.0%. She denies any issus of injuries, illnesses or hospitalizations. Pt notes that she has had a lot of stress at work and and that contributed to higher BGs over the last 3-4 months. This also lead to her not working out as much. Over the summer her stress has been better and she has been back into her work out routine. She is still taking Basaglar 7 units daily and Novolog 1:15 carb ratio. Pt notes she does not eat three meals, but tends to \"graze\" during the day. She eats every 2 hours and will cover her carbs as needed. She will have dinner around 5-6PM.       No history of vascular disease. No history of neuropathy, or nephropathy. Last eye exam was April 2019, no retinopathy. She had a BCC removed on 7/1/19. It required Moh's and \"they had to go a little deeper.  Alie Byrd got it all\".    Current Outpatient Medications   Medication Sig    glucose blood VI test strips (ACCU-CHEK INDIO PLUS TEST STRP) strip Accu-Chek Indio Plus test strips    NOVOLOG FLEXPEN U-100 INSULIN 100 unit/mL (3 mL) inpn Inject 2-10 units up to 3 times daily with higher carb meal.    Blood-Glucose Meter (ACCU-CHEK INDIO PLUS METER) misc Check sugars 4 times per day    glucose blood VI test strips (ACCU-CHEK INDIO PLUS TEST STRP) strip Check sugars 4 times per day    insulin glargine (BASAGLAR KWIKPEN U-100 INSULIN) 100 unit/mL (3 mL) inpn INJECT 5 UNITS SUBCUTANEOUSLY EVERY DAY    lancets (ONE TOUCH DELICA) 33 gauge misc CHECK BLOOD SUGAR 5 TIMES PER DAY    lancets (ONE TOUCH DELICA) 33 gauge misc Check blood sugar 5 times per day.  insulin glargine (BASAGLAR KWIKPEN U-100 INSULIN) 100 unit/mL (3 mL) inpn 6 units daily    Lancets (ONETOUCH ULTRASOFT LANCETS) misc Check blood sugar up to 4 times per day.  HYUN PEN NEEDLE 32 gauge x 5/32\" ndle 4 shots daily    acyclovir (ZOVIRAX) 200 mg capsule TAKE ONE CAPSULE BY MOUTH 5 TIMES A DAY    lancets (BD ULTRA FINE LANCETS) 33 gauge misc Check blood sugar 5 times per day    multivitamin (ONE A DAY) tablet Take 1 Tab by mouth daily. No current facility-administered medications for this visit. Allergies   Allergen Reactions    Tree Nut Anaphylaxis     Not true allergy. Peanut sensitivity via allergy testing     Review of Systems:  - Eyes: no blurry vision or double vision  - Cardiovascular: no chest pain  - Respiratory: no shortness of breath  - Musculoskeletal: no myalgias  - Neurological: no numbness/tingling in extremities    Physical Examination:  Blood pressure 115/76, pulse 71, height 5' 5\" (1.651 m), weight 129 lb (58.5 kg).   - General: pleasant, no distress, good eye contact   - Neck: no carotid bruits  - Cardiovascular: regular, normal rate, nl s1 and s2, no m/r/g, 2+ DP pulses   - Respiratory: clear bilaterally  - Integumentary: no edema, no foot ulcers,   - Psychiatric: normal mood and affect    Diabetic foot exam:     Left Foot:   Visual Exam: normal    Pulse DP: 2+ (normal)   Filament test: normal sensation    Vibratory sensation: normal      Right Foot:   Visual Exam: normal    Pulse DP: 2+ (normal)   Filament test: normal sensation    Vibratory sensation: normal        Data Reviewed:   Component      Latest Ref Rng & Units 7/8/2019 7/8/2019 7/8/2019           7:54 AM  7:54 AM  7:54 AM   Glucose      65 - 99 mg/dL   128 (H)   BUN      6 - 24 mg/dL   7   Creatinine      0.57 - 1.00 mg/dL   0.87   GFR est non-AA      >59 mL/min/1.73   77   GFR est AA      >59 mL/min/1.73   89   BUN/Creatinine ratio      9 - 23   8 (L)   Sodium      134 - 144 mmol/L   139   Potassium      3.5 - 5.2 mmol/L   4.4   Chloride      96 - 106 mmol/L   101   CO2      20 - 29 mmol/L   24   Calcium      8.7 - 10.2 mg/dL   9.1   Protein, total      6.0 - 8.5 g/dL   6.6   Albumin      3.5 - 5.5 g/dL   4.2   GLOBULIN, TOTAL      1.5 - 4.5 g/dL   2.4   A-G Ratio      1.2 - 2.2   1.8   Bilirubin, total      0.0 - 1.2 mg/dL   0.5   Alk. phosphatase      39 - 117 IU/L   62   AST      0 - 40 IU/L   20   ALT (SGPT)      0 - 32 IU/L   19   Cholesterol, total      100 - 199 mg/dL  198    Triglyceride      0 - 149 mg/dL  53    HDL Cholesterol      >39 mg/dL  100    VLDL, calculated      5 - 40 mg/dL  11    LDL, calculated      0 - 99 mg/dL  87    Hemoglobin A1c, (calculated)      4.8 - 5.6 % 6.0 (H)     Estimated average glucose      mg/dL 126         Assessment/Plan:   1) DM > Her A1C in July 2019 was 6.0%. Pt to continue the Basaglar to 7 units daily and continue Novolog 1:15 carb ratio. We discussed insulin pump therapy, but she would prefer to continue her current regimen. She is controlling her BGs very well with this regimen and I agree with her. Pt to check her BGs AC/HS (4 times per day) and mail her BG logs to me in 6 weeks. BP at goal on no HTN agents.     Pt voices understanding and agreement with the plan. RTC 6 months       Follow-up and Dispositions    · Return in about 3 months (around 11/22/2019).          Copy sent to:  Dr. Niranjan Carty

## 2019-08-28 DIAGNOSIS — E10.9 TYPE 1 DIABETES MELLITUS WITHOUT COMPLICATION (HCC): ICD-10-CM

## 2019-12-17 ENCOUNTER — OFFICE VISIT (OUTPATIENT)
Dept: PRIMARY CARE CLINIC | Age: 54
End: 2019-12-17

## 2019-12-17 VITALS
RESPIRATION RATE: 17 BRPM | HEIGHT: 65 IN | WEIGHT: 135.2 LBS | OXYGEN SATURATION: 99 % | DIASTOLIC BLOOD PRESSURE: 87 MMHG | BODY MASS INDEX: 22.53 KG/M2 | SYSTOLIC BLOOD PRESSURE: 136 MMHG | HEART RATE: 58 BPM | TEMPERATURE: 96.8 F

## 2019-12-17 DIAGNOSIS — J02.9 SORE THROAT: Primary | ICD-10-CM

## 2019-12-17 DIAGNOSIS — J06.9 VIRAL URI: ICD-10-CM

## 2019-12-17 LAB
S PYO AG THROAT QL: NEGATIVE
VALID INTERNAL CONTROL?: YES

## 2019-12-17 RX ORDER — LEVOCETIRIZINE DIHYDROCHLORIDE 5 MG/1
5 TABLET, FILM COATED ORAL DAILY
Qty: 30 TAB | Refills: 0 | Status: SHIPPED | OUTPATIENT
Start: 2019-12-17 | End: 2020-01-16

## 2019-12-17 RX ORDER — BENZONATATE 200 MG/1
200 CAPSULE ORAL
Qty: 21 CAP | Refills: 0 | Status: SHIPPED | OUTPATIENT
Start: 2019-12-17 | End: 2019-12-24

## 2019-12-17 NOTE — PROGRESS NOTES
Tree Lyles is a 48 y.o. female    Room:5    Chief Complaint   Patient presents with    Sore Throat     Pt States \" not sure whats going on, deep cough, tughtness in chest is getting worse get out of breathe easily, this mornng throat is sore but is not the main problem, type 1 diabetic and whatever is going on is making numbers crazy, started on sunday,has taken allergra daily and alot of fluids\". Visit Vitals  /87 (BP 1 Location: Left arm, BP Patient Position: Sitting)   Pulse (!) 58   Temp 96.8 °F (36 °C) (Oral)   Resp 17   Ht 5' 5\" (1.651 m)   Wt 135 lb 3.2 oz (61.3 kg)   SpO2 99%   BMI 22.50 kg/m²       Pain Scale: 0 - No pain/10    1. Have you been to the ER, urgent care clinic since your last visit? Hospitalized since your last visit? No    2. Have you seen or consulted any other health care providers outside of the [address] [place] [address] since your last visit? Include any pap smears or colon screening.  No

## 2019-12-17 NOTE — PATIENT INSTRUCTIONS
Viral Respiratory Infection: Care Instructions  Your Care Instructions    Viruses are very small organisms. They grow in number after they enter your body. There are many types that cause different illnesses, such as colds and the mumps. The symptoms of a viral respiratory infection often start quickly. They include a fever, sore throat, and runny nose. You may also just not feel well. Or you may not want to eat much. Most viral respiratory infections are not serious. They usually get better with time and self-care. Antibiotics are not used to treat a viral infection. That's because antibiotics will not help cure a viral illness. In some cases, antiviral medicine can help your body fight a serious viral infection. Follow-up care is a key part of your treatment and safety. Be sure to make and go to all appointments, and call your doctor if you are having problems. It's also a good idea to know your test results and keep a list of the medicines you take. How can you care for yourself at home? · Rest as much as possible until you feel better. · Be safe with medicines. Take your medicine exactly as prescribed. Call your doctor if you think you are having a problem with your medicine. You will get more details on the specific medicine your doctor prescribes. · Take an over-the-counter pain medicine, such as acetaminophen (Tylenol), ibuprofen (Advil, Motrin), or naproxen (Aleve), as needed for pain and fever. Read and follow all instructions on the label. Do not give aspirin to anyone younger than 20. It has been linked to Reye syndrome, a serious illness. · Drink plenty of fluids, enough so that your urine is light yellow or clear like water. Hot fluids, such as tea or soup, may help relieve congestion in your nose and throat. If you have kidney, heart, or liver disease and have to limit fluids, talk with your doctor before you increase the amount of fluids you drink.   · Try to clear mucus from your lungs by breathing deeply and coughing. · Gargle with warm salt water once an hour. This can help reduce swelling and throat pain. Use 1 teaspoon of salt mixed in 1 cup of warm water. · Do not smoke or allow others to smoke around you. If you need help quitting, talk to your doctor about stop-smoking programs and medicines. These can increase your chances of quitting for good. To avoid spreading the virus  · Cough or sneeze into a tissue. Then throw the tissue away. · If you don't have a tissue, use your hand to cover your cough or sneeze. Then clean your hand. You can also cough into your sleeve. · Wash your hands often. Use soap and warm water. Wash for 15 to 20 seconds each time. · If you don't have soap and water near you, you can clean your hands with alcohol wipes or gel. When should you call for help? Call your doctor now or seek immediate medical care if:    · You have a new or higher fever.     · Your fever lasts more than 48 hours.     · You have trouble breathing.     · You have a fever with a stiff neck or a severe headache.     · You are sensitive to light.     · You feel very sleepy or confused.    Watch closely for changes in your health, and be sure to contact your doctor if:    · You do not get better as expected. Where can you learn more? Go to http://padmaja-tiffany.info/. Enter K111 in the search box to learn more about \"Viral Respiratory Infection: Care Instructions. \"  Current as of: June 9, 2019  Content Version: 12.2  © 8678-8149 [employer]. Care instructions adapted under license by [employer] (which disclaims liability or warranty for this information). If you have questions about a medical condition or this instruction, always ask your healthcare professional. [address] any warranty or liability for your use of this information.

## 2019-12-18 NOTE — PROGRESS NOTES
Subjective:   Oris Friday is a 48 y.o. female who complains of congestion and sore throat for 2 days, stable since that time. She denies a history of shortness of breath and wheezing. Evaluation to date: none. Treatment to date: OTC products. Patient does not smoke cigarettes. Relevant PMH: No pertinent additional PMH. Patient Active Problem List   Diagnosis Code    Acute serous otitis media H65.00    Type 1 diabetes mellitus without complication ([facility] [address] R65.1     Patient Active Problem List    Diagnosis Date Noted    Type 1 diabetes mellitus without complication ([facility] [address] 55/69/0777    Acute serous otitis media 10/07/2015     Current Outpatient Medications   Medication Sig Dispense Refill    levocetirizine (XYZAL) 5 mg tablet Take 1 Tab by mouth daily for 30 days. 30 Tab 0    benzonatate (TESSALON) 200 mg capsule Take 1 Cap by mouth three (3) times daily as needed for Cough for up to 7 days. 21 Cap 0    NOVOLOG FLEXPEN U-100 INSULIN 100 unit/mL (3 mL) inpn Inject 2-10 units up to 3 times daily with higher carb meal. 15 mL 6    HYUN PEN NEEDLE 32 gauge x 5/32\" ndle 4 shots daily 200 Pen Needle 11    glucose blood VI test strips (ACCU-CHEK INDIO PLUS TEST STRP) strip Accu-Chek Indio Plus test strips      Blood-Glucose Meter (ACCU-CHEK INDIO PLUS METER) misc Check sugars 4 times per day 1 Each 0    glucose blood VI test strips (ACCU-CHEK INDIO PLUS TEST STRP) strip Check sugars 4 times per day 400 Strip 3    lancets (ONE TOUCH DELICA) 33 gauge misc CHECK BLOOD SUGAR 5 TIMES PER  Lancet 11    lancets (ONE TOUCH DELICA) 33 gauge misc Check blood sugar 5 times per day. 150 Lancet 11    insulin glargine (BASAGLAR KWIKPEN U-100 INSULIN) 100 unit/mL (3 mL) inpn 6 units daily 15 mL 3    Lancets (ONETOUCH ULTRASOFT LANCETS) misc Check blood sugar up to 4 times per day.  200 Each 11    acyclovir (ZOVIRAX) 200 mg capsule TAKE ONE CAPSULE BY MOUTH 5 TIMES A DAY      lancets (BD ULTRA FINE LANCETS) 33 gauge misc Check blood sugar 5 times per day 200 Lancet 3    multivitamin (ONE A DAY) tablet Take 1 Tab by mouth daily. Allergies   Allergen Reactions    Tree Nut Anaphylaxis     Not true allergy. Peanut sensitivity via allergy testing     Past Medical History:   Diagnosis Date    Diabetes ([address] 2017    Type 1 diabetes    Menopause      Past Surgical History:   Procedure Laterality Date    HX  SECTION Bilateral     IMPLANT BREAST SILICONE/EQ Bilateral          Family History   Problem Relation Age of Onset    Hypertension Mother     Stroke Mother     Heart Disease Mother         COPD    Lung Disease Mother         COPD    No Known Problems Father     No Known Problems Brother     Cancer Maternal Aunt         Lung, Yimi and Colon    Breast Cancer Maternal Aunt     Cancer Maternal Grandmother         Pancreatic    Thyroid Disease Brother     Breast Cancer Maternal Aunt     Diabetes Neg Hx      Social History     Tobacco Use    Smoking status: Never Smoker    Smokeless tobacco: Never Used   Substance Use Topics    Alcohol use: Yes     Alcohol/week: 0.0 standard drinks     Comment: social        Review of Systems  Pertinent items are noted in HPI. Objective:     Visit Vitals  /87 (BP 1 Location: Left arm, BP Patient Position: Sitting)   Pulse (!) 58   Temp 96.8 °F (36 °C) (Oral)   Resp 17   Ht 5' 5\" (1.651 m)   Wt 135 lb 3.2 oz (61.3 kg)   SpO2 99%   BMI 22.50 kg/m²     General:  alert, cooperative, no distress   Eyes: conjunctivae/corneas clear. PERRL, EOM's intact. Fundi benign   Ears: normal TM's and external ear canals AU   Sinuses: Normal paranasal sinuses without tenderness   Mouth:  Lips, mucosa, and tongue normal. Teeth and gums normal   Neck: supple, symmetrical, trachea midline and no adenopathy. Heart: S1 and S2 normal, no murmurs noted. Lungs: clear to auscultation bilaterally   Abdomen: soft, non-tender.  Bowel sounds normal. No masses,  no organomegaly        Assessment/Plan:   viral upper respiratory illness  Suggested symptomatic OTC remedies. RTC prn. Discussed diagnosis and treatment of viral URIs. Discussed the importance of avoiding unnecessary antibiotic therapy. ICD-10-CM ICD-9-CM    1. Sore throat J02.9 462 AMB POC RAPID STREP A   2. Viral URI J06.9 465.9 levocetirizine (XYZAL) 5 mg tablet      benzonatate (TESSALON) 200 mg capsule   .

## 2019-12-26 ENCOUNTER — CLINICAL SUPPORT (OUTPATIENT)
Dept: PRIMARY CARE CLINIC | Age: 54
End: 2019-12-26

## 2019-12-26 DIAGNOSIS — Z23 ENCOUNTER FOR IMMUNIZATION: Primary | ICD-10-CM

## 2020-01-30 ENCOUNTER — OFFICE VISIT (OUTPATIENT)
Dept: PRIMARY CARE CLINIC | Age: 55
End: 2020-01-30

## 2020-01-30 VITALS
BODY MASS INDEX: 23.26 KG/M2 | OXYGEN SATURATION: 96 % | RESPIRATION RATE: 18 BRPM | HEART RATE: 76 BPM | SYSTOLIC BLOOD PRESSURE: 109 MMHG | HEIGHT: 65 IN | WEIGHT: 139.6 LBS | TEMPERATURE: 98.5 F | DIASTOLIC BLOOD PRESSURE: 76 MMHG

## 2020-01-30 DIAGNOSIS — M54.2 NECK DISCOMFORT: ICD-10-CM

## 2020-01-30 DIAGNOSIS — B34.9 VIRAL ILLNESS: Primary | ICD-10-CM

## 2020-01-30 DIAGNOSIS — R68.89 FLU-LIKE SYMPTOMS: ICD-10-CM

## 2020-01-30 LAB
FLUAV+FLUBV AG NOSE QL IA.RAPID: NEGATIVE POS/NEG
FLUAV+FLUBV AG NOSE QL IA.RAPID: NEGATIVE POS/NEG
S PYO AG THROAT QL: NEGATIVE
VALID INTERNAL CONTROL?: YES
VALID INTERNAL CONTROL?: YES

## 2020-01-30 NOTE — PROGRESS NOTES
Kaye Shetty is a 48 y.o. female    Room:4    Chief Complaint   Patient presents with    Sore Throat     Pt States\" woke up at 3am and felt achy, not really sore throat but more swollen feeling, low grade fever and has taken ibuprofen, left ear pain \". Visit Vitals  /76 (BP 1 Location: Left arm, BP Patient Position: Sitting)   Pulse 76   Temp 98.5 °F (36.9 °C) (Oral)   Resp 18   Ht 5' 5\" (1.651 m)   Wt 139 lb 9.6 oz (63.3 kg)   SpO2 96%   BMI 23.23 kg/m²       Pain Scale: /10    1. Have you been to the ER, urgent care clinic since your last visit? Hospitalized since your last visit? No    2. Have you seen or consulted any other health care providers outside of the [address] [place] [address] since your last visit? Include any pap smears or colon screening.  No

## 2020-01-30 NOTE — PROGRESS NOTES
Subjective:   Kaye Shetty is a 48 y.o. female who complains of fever, pressure/discomfort to left neck for 1 day, stable since that time. Started and woke her at 3am.  Not a sore throat per se. Denies any congestion, cough, or nasal discharge. Denies any N/V. She denies a history of shortness of breath and wheezing. She just returned from a [address] on 2020 and works in [employer]. Evaluation to date: none. Treatment to date: OTC products. Patient does not smoke cigarettes. Relevant PMH:   Past Medical History:   Diagnosis Date    Diabetes ([address] 2017    Type 1 diabetes    Menopause      Past Surgical History:   Procedure Laterality Date    HX  SECTION Bilateral     IMPLANT BREAST SILICONE/EQ Bilateral          Allergies   Allergen Reactions    Tree Nut Anaphylaxis     Not true allergy. Peanut sensitivity via allergy testing         Review of Systems  Pertinent items are noted in HPI. Objective:     Visit Vitals  /76 (BP 1 Location: Left arm, BP Patient Position: Sitting)   Pulse 76   Temp 98.5 °F (36.9 °C) (Oral)   Resp 18   Ht 5' 5\" (1.651 m)   Wt 139 lb 9.6 oz (63.3 kg)   SpO2 96%   BMI 23.23 kg/m²     General:  alert, cooperative, no distress   Eyes: negative   Ears: normal TM's and external ear canals AU   Sinuses: Normal paranasal sinuses without tenderness   Mouth:  Lips, mucosa, and tongue normal. Teeth and gums normal and normal findings: oropharynx pink & moist without lesions or evidence of thrush   Neck: supple, symmetrical, trachea midline and no adenopathy. No thyroid enlargement. Heart: S1 and S2 normal, no murmurs noted. Lungs: clear to auscultation bilaterally          Results for orders placed or performed in visit on 19   AMB POC RAPID STREP A   Result Value Ref Range    VALID INTERNAL CONTROL POC Yes     Group A Strep Ag Negative Negative       Assessment/Plan:       ICD-10-CM ICD-9-CM    1. Viral illness B34.9 079.99    2. Flu-like symptoms R68.89 780.99 AMB POC WAGNER INFLUENZA A/B TEST   3. Neck discomfort M54.2 723.1 AMB POC RAPID STREP A      AMB POC WAGNER INFLUENZA A/B TEST       Suggested symptomatic OTC remedies. RTC prn. Jabier Hillman NP  This note will not be viewable in [address]

## 2020-01-30 NOTE — PATIENT INSTRUCTIONS

## 2020-02-20 ENCOUNTER — OFFICE VISIT (OUTPATIENT)
Dept: ENDOCRINOLOGY | Age: 55
End: 2020-02-20

## 2020-02-20 VITALS
DIASTOLIC BLOOD PRESSURE: 79 MMHG | HEIGHT: 65 IN | SYSTOLIC BLOOD PRESSURE: 115 MMHG | BODY MASS INDEX: 22.79 KG/M2 | HEART RATE: 63 BPM | WEIGHT: 136.8 LBS

## 2020-02-20 DIAGNOSIS — E10.9 TYPE 1 DIABETES MELLITUS WITHOUT COMPLICATION (HCC): Primary | ICD-10-CM

## 2020-02-20 LAB — HBA1C MFR BLD HPLC: 6.3 %

## 2020-02-20 RX ORDER — LANCETS 33 GAUGE
EACH MISCELLANEOUS
Qty: 200 LANCET | Refills: 11 | Status: SHIPPED | OUTPATIENT
Start: 2020-02-20 | End: 2020-08-20

## 2020-02-20 RX ORDER — INSULIN GLARGINE 100 [IU]/ML
INJECTION, SOLUTION SUBCUTANEOUS
Qty: 15 ML | Refills: 3 | Status: SHIPPED | OUTPATIENT
Start: 2020-02-20 | End: 2020-08-20 | Stop reason: SDUPTHER

## 2020-02-20 RX ORDER — INSULIN ASPART 100 [IU]/ML
INJECTION, SOLUTION INTRAVENOUS; SUBCUTANEOUS
Qty: 15 ML | Refills: 6 | Status: SHIPPED | OUTPATIENT
Start: 2020-02-20 | End: 2020-08-20 | Stop reason: SDUPTHER

## 2020-02-20 NOTE — PROGRESS NOTES
Chief Complaint   Patient presents with    Diabetes     pcp and pharmacy verified   Records since last visit reviewed  History of Present Illness: Jeremy Posey is a 48 y.o. female here for follow up of diabetes. Pt notes that over [facility] [place] day weekend (2017)  she \"came down with a Virus and sinus infection\" She was put on Abx and she noted polyuria, polydipsia and blurred vision. She had also lost 10 pounds in a short period of time. She saw her eye doctor who noted swelling in her cornia (will request these records). The swelling improved but did not normalize, her eye doctor recommended she get labs drawn and her PCP cecilia labs, her BG was 300 and her PCP started her on Glipizide. She continued to have symptoms so she checked her BG on a glucometer said \"high\" so she went to the ED and her BG >600. Her A1C was 13.1%. AB testing showed positive [place] and CRP. At our initial visit in August 2017 I tested her for autoimmune DM   Her insulin, proinsulin and c-peptide were low normal and one of her 4 antibodies were positive. (ZN-8). This fits with Auto-immune DM Type 1/NII. Her A1C today was 6.3%. \"My diabetes has not been the best, I have had some nights going to bed in the 150's and waking up with 250's, I have not been under 130 in the morning in two months. \" She note that in December she had a Viral infection she had negative strep x2 and negative flu. She notes that she is remaining physically active, though she is eating more fruit in her diet. Pt brought her glucometer with her today. (see scanned documents)  No low BGs. She is training a new , she is working from [employer] he notes her work has been very high stress. She is still taking Basaglar 7 units daily and Novolog 1:15 carb ratio. Pt notes she does not eat three meals, but tends to \"graze\" during the day. She eats every 2 hours and will cover her carbs as needed.   She will have dinner around 5-6PM.       No history of vascular disease. No history of neuropathy, or nephropathy. Last eye exam was April 2019, no retinopathy. She had a BCC removed on 7/1/19. It required Moh's and \"they had to go a little deeper. Luiz Larson got it all\". She had follow up in January 2020 and \"there were no concerning spots or anything. \"    Current Outpatient Medications   Medication Sig    NOVOLOG FLEXPEN U-100 INSULIN 100 unit/mL (3 mL) inpn Inject 2-10 units up to 3 times daily with higher carb meal.    HYUN PEN NEEDLE 32 gauge x 5/32\" ndle 4 shots daily    glucose blood VI test strips (ACCU-CHEK DANIELA PLUS TEST STRP) strip Accu-Chek Daniela Plus test strips    Blood-Glucose Meter (ACCU-CHEK DANIELA PLUS METER) misc Check sugars 4 times per day    glucose blood VI test strips (ACCU-CHEK DANIELA PLUS TEST STRP) strip Check sugars 4 times per day    lancets (ONE TOUCH DELICA) 33 gauge misc CHECK BLOOD SUGAR 5 TIMES PER DAY    insulin glargine (BASAGLAR KWIKPEN U-100 INSULIN) 100 unit/mL (3 mL) inpn 6 units daily (Patient taking differently: 7  units daily)    Lancets (ONETOUCH ULTRASOFT LANCETS) misc Check blood sugar up to 4 times per day.  acyclovir (ZOVIRAX) 200 mg capsule TAKE ONE CAPSULE BY MOUTH 5 TIMES A DAY    lancets (BD ULTRA FINE LANCETS) 33 gauge misc Check blood sugar 5 times per day    multivitamin (ONE A DAY) tablet Take 1 Tab by mouth daily. No current facility-administered medications for this visit. Allergies   Allergen Reactions    Tree Nut Anaphylaxis     Not true allergy. Peanut sensitivity via allergy testing     Review of Systems:  - Eyes: no blurry vision or double vision  - Cardiovascular: no chest pain  - Respiratory: no shortness of breath  - Musculoskeletal: no myalgias  - Neurological: no numbness/tingling in extremities    Physical Examination:  Blood pressure 115/79, pulse 63, height 5' 5\" (1.651 m), weight 136 lb 12.8 oz (62.1 kg).   - General: pleasant, no distress, good eye contact - Neck: no carotid bruits  - Cardiovascular: regular, normal rate, nl s1 and s2, no m/r/g, 2+ DP pulses   - Respiratory: clear bilaterally  - Integumentary: no edema, no foot ulcers,   - Psychiatric: normal mood and affect    Diabetic foot exam:     Left Foot:   Visual Exam: normal    Pulse DP: 2+ (normal)   Filament test: normal sensation    Vibratory sensation: normal      Right Foot:   Visual Exam: normal    Pulse DP: 2+ (normal)   Filament test: normal sensation    Vibratory sensation: normal        Data Reviewed:   Component      Latest Ref Rng & Units 1/30/2020 1/30/2020 12/17/2019           7:26 PM  7:25 PM  8:28 AM   VALID INTERNAL CONTROL POC       Yes Yes Yes   Influenza A Ag POC      Negative Pos/Neg Negative     Influenza B Ag POC      Negative Pos/Neg Negative     Group A Strep Ag      Negative  Negative Negative       Her A1C today was 6.3%. Assessment/Plan:   1) DM > Her A1C today was 6.3%. Since her FBGs have been higher, will increase her Basaglar to 8 units daily and continue Novolog 1:15 carb ratio. She mentioned that she was having night sweats from menopause. I recommended she check her BGs in the middle of the night if she wakes with night sweats, to make sure she is not having low BGs overnight. We discussed insulin pump therapy, but she would prefer to continue her current regimen. She is controlling her BGs very well with this regimen and I agree with her. Pt to check her BGs AC/HS (4 times per day) and mail her BG logs to me in 6 weeks. BP at goal on no HTN agents. Pt voices understanding and agreement with the plan.       RTC 6 months           Copy sent to:  Dr. Hang Talley

## 2020-02-21 LAB
ALBUMIN/CREAT UR: 9 MG/G CREAT (ref 0–29)
CREAT UR-MCNC: 88.4 MG/DL
MICROALBUMIN UR-MCNC: 7.7 UG/ML

## 2020-03-02 ENCOUNTER — OFFICE VISIT (OUTPATIENT)
Dept: PRIMARY CARE CLINIC | Age: 55
End: 2020-03-02

## 2020-03-02 VITALS
HEIGHT: 65 IN | WEIGHT: 136.6 LBS | RESPIRATION RATE: 24 BRPM | BODY MASS INDEX: 22.76 KG/M2 | DIASTOLIC BLOOD PRESSURE: 85 MMHG | HEART RATE: 64 BPM | TEMPERATURE: 98.2 F | SYSTOLIC BLOOD PRESSURE: 128 MMHG | OXYGEN SATURATION: 98 %

## 2020-03-02 DIAGNOSIS — S69.92XA INJURY OF FINGER OF LEFT HAND, INITIAL ENCOUNTER: Primary | ICD-10-CM

## 2020-03-02 NOTE — PROGRESS NOTES
Gillian Lawson is a 48 y.o. female    Room:1    Chief Complaint   Patient presents with    Hand Injury     Pt States \" im concerned i may have broken or fracture my left index finger, a piece of furniture approximatkey 20 pound in weight dropped on my finger while trying to mobve it, cant bend finger, has taken ibuprofen\". Visit Vitals  /85 (BP 1 Location: Right arm, BP Patient Position: Sitting)   Pulse 64   Temp 98.2 °F (36.8 °C) (Oral)   Resp 24   Ht 5' 5\" (1.651 m)   Wt 136 lb 9.6 oz (62 kg)   SpO2 98%   BMI 22.73 kg/m²       Pain Scale: 7/10    1. Have you been to the ER, urgent care clinic since your last visit? Hospitalized since your last visit? No    2. Have you seen or consulted any other health care providers outside of the [address] [place] [address] since your last visit? Include any pap smears or colon screening.  No

## 2020-03-02 NOTE — PROGRESS NOTES
HISTORY OF PRESENT ILLNESS  Jose Cosme is a 48 y.o. female. Hand Injury    The history is provided by the patient. This is a new problem. The problem occurs constantly. The pain is present in the left fingers. The pain is severe. Associated symptoms include limited range of motion. Pertinent negatives include no numbness, no tingling, no itching, no back pain and no neck pain. Review of Systems   Constitutional: Negative for chills, fever and weight loss. Respiratory: Negative for cough and shortness of breath. Cardiovascular: Negative for chest pain and palpitations. Musculoskeletal: Positive for joint pain. Negative for back pain, falls, myalgias and neck pain. Skin: Negative for itching and rash. Neurological: Negative for dizziness, tingling, tremors, sensory change, focal weakness, weakness, numbness and headaches. Psychiatric/Behavioral: Negative for depression, hallucinations, substance abuse and suicidal ideas. The patient is not nervous/anxious and does not have insomnia. Physical Exam  Constitutional:       General: She is not in acute distress. Appearance: She is not diaphoretic. Eyes:      General: No scleral icterus. Right eye: No discharge. Left eye: No discharge. Conjunctiva/sclera: Conjunctivae normal.      Pupils: Pupils are equal, round, and reactive to light. Neck:      Musculoskeletal: Normal range of motion and neck supple. Thyroid: No thyromegaly. Vascular: No JVD. Trachea: No tracheal deviation. Cardiovascular:      Rate and Rhythm: Regular rhythm. Heart sounds: No murmur. No friction rub. No gallop. Pulmonary:      Effort: Pulmonary effort is normal. No respiratory distress. Breath sounds: Normal breath sounds. No stridor. No wheezing or rales. Chest:      Chest wall: No tenderness. Abdominal:      General: Bowel sounds are normal. There is no distension. Palpations: Abdomen is soft. There is no mass. Tenderness: There is no abdominal tenderness. There is no guarding or rebound. Musculoskeletal:         General: Swelling and tenderness present. Lymphadenopathy:      Cervical: No cervical adenopathy. Skin:     General: Skin is warm. Coloration: Skin is not pale. Findings: No erythema or rash. Neurological:      Mental Status: She is alert and oriented to person, place, and time. Cranial Nerves: No cranial nerve deficit. Motor: No abnormal muscle tone. Coordination: Coordination normal.      Deep Tendon Reflexes: Reflexes are normal and symmetric. Reflexes normal.   Psychiatric:         Behavior: Behavior normal.         Thought Content: Thought content normal.         Judgment: Judgment normal.         ASSESSMENT and PLAN  Encounter Diagnoses   Name Primary?  Injury of finger of left hand, initial encounter Yes   I have discussed the diagnosis with the patient and the intended plan as seen in the above orders. The patient has received an after-visit summary and questions were answered concerning future plans. I have discussed medication side effects and warnings with the patient as well. Follow-up Disposition:  Advised ER if symptoms worsen or fail to improve.      Keep splint for  3 days   RTC if not resolved  Called and told her Xray finding

## 2020-03-12 RX ORDER — PEN NEEDLE, DIABETIC 32GX 5/32"
NEEDLE, DISPOSABLE MISCELLANEOUS
Qty: 200 PEN NEEDLE | Refills: 11 | Status: SHIPPED | OUTPATIENT
Start: 2020-03-12 | End: 2020-08-20 | Stop reason: SDUPTHER

## 2020-08-12 LAB
ALBUMIN SERPL-MCNC: 4.4 G/DL (ref 3.8–4.9)
ALBUMIN/GLOB SERPL: 2.1 {RATIO} (ref 1.2–2.2)
ALP SERPL-CCNC: 78 IU/L (ref 39–117)
ALT SERPL-CCNC: 10 IU/L (ref 0–32)
AST SERPL-CCNC: 14 IU/L (ref 0–40)
BILIRUB SERPL-MCNC: 0.4 MG/DL (ref 0–1.2)
BUN SERPL-MCNC: 7 MG/DL (ref 6–24)
BUN/CREAT SERPL: 8 (ref 9–23)
CALCIUM SERPL-MCNC: 9.5 MG/DL (ref 8.7–10.2)
CHLORIDE SERPL-SCNC: 104 MMOL/L (ref 96–106)
CHOLEST SERPL-MCNC: 174 MG/DL (ref 100–199)
CO2 SERPL-SCNC: 25 MMOL/L (ref 20–29)
CREAT SERPL-MCNC: 0.84 MG/DL (ref 0.57–1)
EST. AVERAGE GLUCOSE BLD GHB EST-MCNC: 131 MG/DL
GLOBULIN SER CALC-MCNC: 2.1 G/DL (ref 1.5–4.5)
GLUCOSE SERPL-MCNC: 146 MG/DL (ref 65–99)
HBA1C MFR BLD: 6.2 % (ref 4.8–5.6)
HDLC SERPL-MCNC: 85 MG/DL
INTERPRETATION, 910389: NORMAL
LDLC SERPL CALC-MCNC: 78 MG/DL (ref 0–99)
POTASSIUM SERPL-SCNC: 5 MMOL/L (ref 3.5–5.2)
PROT SERPL-MCNC: 6.5 G/DL (ref 6–8.5)
SODIUM SERPL-SCNC: 142 MMOL/L (ref 134–144)
TRIGL SERPL-MCNC: 54 MG/DL (ref 0–149)
VLDLC SERPL CALC-MCNC: 11 MG/DL (ref 5–40)

## 2020-08-20 ENCOUNTER — VIRTUAL VISIT (OUTPATIENT)
Dept: ENDOCRINOLOGY | Age: 55
End: 2020-08-20
Payer: COMMERCIAL

## 2020-08-20 DIAGNOSIS — E10.9 TYPE 1 DIABETES MELLITUS WITHOUT COMPLICATION (HCC): Primary | ICD-10-CM

## 2020-08-20 DIAGNOSIS — E10.9 TYPE 1 DIABETES MELLITUS WITHOUT COMPLICATION (HCC): ICD-10-CM

## 2020-08-20 PROCEDURE — 99213 OFFICE O/P EST LOW 20 MIN: CPT | Performed by: INTERNAL MEDICINE

## 2020-08-20 RX ORDER — INSULIN ASPART 100 [IU]/ML
INJECTION, SOLUTION INTRAVENOUS; SUBCUTANEOUS
Qty: 15 ML | Refills: 6 | Status: SHIPPED | OUTPATIENT
Start: 2020-08-20 | End: 2020-12-14 | Stop reason: SDUPTHER

## 2020-08-20 RX ORDER — PEN NEEDLE, DIABETIC 32GX 5/32"
NEEDLE, DISPOSABLE MISCELLANEOUS
Qty: 400 PEN NEEDLE | Refills: 3 | Status: SHIPPED | OUTPATIENT
Start: 2020-08-20 | End: 2021-07-01 | Stop reason: SDUPTHER

## 2020-08-20 RX ORDER — BLOOD SUGAR DIAGNOSTIC
STRIP MISCELLANEOUS
Qty: 400 STRIP | Refills: 3 | Status: SHIPPED | OUTPATIENT
Start: 2020-08-20 | End: 2021-07-01 | Stop reason: SDUPTHER

## 2020-08-20 RX ORDER — INSULIN GLARGINE 100 [IU]/ML
INJECTION, SOLUTION SUBCUTANEOUS
Qty: 15 ML | Refills: 3 | Status: SHIPPED | OUTPATIENT
Start: 2020-08-20 | End: 2020-10-19 | Stop reason: SDUPTHER

## 2020-08-20 RX ORDER — LANCETS 33 GAUGE
EACH MISCELLANEOUS
Qty: 400 LANCET | Refills: 3 | Status: SHIPPED | OUTPATIENT
Start: 2020-08-20

## 2020-08-20 NOTE — PROGRESS NOTES
Chief Complaint   Patient presents with    Diabetes     Doxy: [phone]    Other     Pharmacy: [facility]   Records since last visit reviewed          **THIS IS A VIRTUAL VISIT VIA A VIDEO ENCOUNTER. PATIENT AGREED TO HAVE THEIR CARE DELIVERED OVER VIDEO IN PLACE OF THEIR REGULARLY SCHEDULED OFFICE VISIT**      History of Present Illness: Melanie Jurado is a 48 y.o. female here for follow up of diabetes. Pt notes that over [address] [facility] day weekend (2017)  she \"came down with a Virus and sinus infection\" She was put on Abx and she noted polyuria, polydipsia and blurred vision. She had also lost 10 pounds in a short period of time. She saw her eye doctor who noted swelling in her cornia (will request these records). The swelling improved but did not normalize, her eye doctor recommended she get labs drawn and her PCP cecilia labs, her BG was 300 and her PCP started her on Glipizide. She continued to have symptoms so she checked her BG on a glucometer said \"high\" so she went to the ED and her BG >600. Her A1C was 13.1%. AB testing showed positive [place] and CRP. At our initial visit in August 2017 I tested her for autoimmune DM   Her insulin, proinsulin and c-peptide were low normal and one of her 4 antibodies were positive. (ZN-8). This fits with Auto-immune DM Type 1/NII. Her A1C last week was 6.2%. She is still taking Basaglar 8 units daily and Novolog 1:15 carb ratio. \"I am an  and our [facility] is the only that will be bringing kids back so this is going to be very stressful. \"I miss the children, but parents have so much gregorio that we are going to be able to keep the kids 3 feet apart and keep their masks on is hard. \"    She denies any illnesses, injuries or hospitalizations    She is testing her BGs 4-6 times per day. She notes that \"I don't know how much I am going to eat at the meal so I don't bolus till after I have eaten. \"  \"I am a slow eater so I have to be careful about when I give my insulin. I try to limit my carb intake to make it safer and better. \"    Pt notes she does not eat three meals, but tends to \"graze\" during the day. She eats every 2 hours and will cover her carbs as needed. She will have dinner around 5-6PM.       No history of vascular disease. No history of neuropathy, or nephropathy. Last eye exam was April 2019, no retinopathy. She had a BCC removed on 7/1/19. It required Moh's and \"they had to go a little deeper. Isma Singletary got it all\". She had follow up in January 2020 and \"there were no concerning spots or anything. \"  She has an appointment with Dermatology in September. \"I have a spot on my nose and I am pretty sure I will need Moh's for this\". Current Outpatient Medications   Medication Sig    glucose blood VI test strips (Accu-Chek Indio Plus test strp) strip CHECK SUGARS 4 TIMES PER DAY    Gauri Pen Needle 32 gauge x 5/32\" ndle 4 shots daily    IBUPROFEN PO Take  by mouth.  insulin glargine (BASAGLAR KWIKPEN U-100 INSULIN) 100 unit/mL (3 mL) inpn 8 units every day    NOVOLOG FLEXPEN U-100 INSULIN 100 unit/mL (3 mL) inpn Inject 2-10 units up to 3 times daily with higher carb meal.    Blood-Glucose Meter (ACCU-CHEK INDIO PLUS METER) misc Check sugars 4 times per day    acyclovir (ZOVIRAX) 200 mg capsule TAKE ONE CAPSULE BY MOUTH 5 TIMES A DAY    lancets (BD ULTRA FINE LANCETS) 33 gauge misc Check blood sugar 5 times per day    multivitamin (ONE A DAY) tablet Take 1 Tab by mouth daily.  lancets (ONE TOUCH DELICA) 35 gauge misc CHECK BLOOD SUGAR 5 TIMES PER DAY     No current facility-administered medications for this visit. Allergies   Allergen Reactions    Tree Nut Anaphylaxis     Not true allergy.  Peanut sensitivity via allergy testing     Review of Systems:  - Eyes: no blurry vision or double vision  - Cardiovascular: no chest pain  - Respiratory: no shortness of breath  - Musculoskeletal: no myalgias  - Neurological: no numbness/tingling in extremities    Physical Examination:  There were no vitals taken for this visit. - GENERAL: NCAT, Appears well nourished   - EYES: EOMI, non-icteric, no proptosis   - Ear/Nose/Throat: NCAT, no visible inflammation or masses   - CARDIOVASCULAR: no cyanosis, no visible JVD   - RESPIRATORY: respiratory effort normal without any distress or labored breathing   - MUSCULOSKELETAL: Normal ROM of neck and upper extremities observed   - SKIN: No rash on face   - NEUROLOGIC:  No facial asymmetry (Cranial nerve 7 motor function), No gaze palsy   - PSYCHIATRIC: Normal affect, Normal insight and judgement         Data Reviewed:   Component      Latest Ref Rng & Units 8/11/2020 8/11/2020 8/11/2020           8:33 AM  8:33 AM  8:33 AM   Glucose      65 - 99 mg/dL 146 (H)     BUN      6 - 24 mg/dL 7     Creatinine      0.57 - 1.00 mg/dL 0.84     GFR est non-AA      >59 mL/min/1.73 80     GFR est AA      >59 mL/min/1.73 92     BUN/Creatinine ratio      9 - 23 8 (L)     Sodium      134 - 144 mmol/L 142     Potassium      3.5 - 5.2 mmol/L 5.0     Chloride      96 - 106 mmol/L 104     CO2      20 - 29 mmol/L 25     Calcium      8.7 - 10.2 mg/dL 9.5     Protein, total      6.0 - 8.5 g/dL 6.5     Albumin      3.8 - 4.9 g/dL 4.4     GLOBULIN, TOTAL      1.5 - 4.5 g/dL 2.1     A-G Ratio      1.2 - 2.2 2.1     Bilirubin, total      0.0 - 1.2 mg/dL 0.4     Alk. phosphatase      39 - 117 IU/L 78     AST      0 - 40 IU/L 14     ALT      0 - 32 IU/L 10     Cholesterol, total      100 - 199 mg/dL  174    Triglyceride      0 - 149 mg/dL  54    HDL Cholesterol      >39 mg/dL  85    VLDL, calculated      5 - 40 mg/dL  11    LDL, calculated      0 - 99 mg/dL  78    Hemoglobin A1c, (calculated)      4.8 - 5.6 %   6.2 (H)   Estimated average glucose      mg/dL   131       Assessment/Plan:   1) DM > Her A1C last week was 6.2%.  Pt encouraged to keep up the good work and continue the [address] 8 daily and continue Novolog 1:15 carb ratio. We discussed insulin pump therapy, but she would prefer to continue her current regimen. She is controlling her BGs very well with this regimen and I agree with her. Pt to check her BGs AC/HS (4 times per day) and mail her BG logs to me in 6 weeks. BP has been at goal on no HTN agents and her lipid panel has been at goal on no anti-lipid agents. Pt voices understanding and agreement with the plan.       RTC 4 months           Copy sent to:  Dr. Ralph Chino

## 2020-08-20 NOTE — PROGRESS NOTES
Lab Results   Component Value Date/Time    Hemoglobin A1c 6.2 (H) 08/11/2020 08:33 AM    Hemoglobin A1c 6.0 (H) 07/08/2019 07:54 AM    Hemoglobin A1c 5.3 05/10/2018 09:01 AM     Lab Results   Component Value Date/Time    Sodium 142 08/11/2020 08:33 AM    Potassium 5.0 08/11/2020 08:33 AM    Chloride 104 08/11/2020 08:33 AM    CO2 25 08/11/2020 08:33 AM    Anion gap 10 07/24/2017 12:42 AM    Glucose 146 (H) 08/11/2020 08:33 AM    BUN 7 08/11/2020 08:33 AM    Creatinine 0.84 08/11/2020 08:33 AM    BUN/Creatinine ratio 8 (L) 08/11/2020 08:33 AM    GFR est AA 92 08/11/2020 08:33 AM    GFR est non-AA 80 08/11/2020 08:33 AM    Calcium 9.5 08/11/2020 08:33 AM    Bilirubin, total 0.4 08/11/2020 08:33 AM    Alk.  phosphatase 78 08/11/2020 08:33 AM    Protein, total 6.5 08/11/2020 08:33 AM    Albumin 4.4 08/11/2020 08:33 AM    Globulin 3.1 07/24/2017 12:42 AM    A-G Ratio 2.1 08/11/2020 08:33 AM    ALT (SGPT) 10 08/11/2020 08:33 AM    AST (SGOT) 14 08/11/2020 08:33 AM     Lab Results   Component Value Date/Time    Cholesterol, total 174 08/11/2020 08:33 AM    HDL Cholesterol 85 08/11/2020 08:33 AM    LDL, calculated 78 08/11/2020 08:33 AM    VLDL, calculated 11 08/11/2020 08:33 AM    Triglyceride 54 08/11/2020 08:33 AM

## 2020-09-01 ENCOUNTER — HOSPITAL ENCOUNTER (OUTPATIENT)
Dept: MAMMOGRAPHY | Age: 55
Discharge: HOME | End: 2020-09-01
Attending: OBSTETRICS & GYNECOLOGY
Payer: COMMERCIAL

## 2020-09-01 DIAGNOSIS — Z12.31 SCREENING MAMMOGRAM FOR HIGH-RISK PATIENT: ICD-10-CM

## 2020-09-01 PROCEDURE — 77067 SCR MAMMO BI INCL CAD: CPT

## 2020-09-08 ENCOUNTER — HOSPITAL ENCOUNTER (OUTPATIENT)
Dept: MAMMOGRAPHY | Age: 55
Discharge: HOME | End: 2020-09-08
Attending: OBSTETRICS & GYNECOLOGY
Payer: COMMERCIAL

## 2020-09-08 DIAGNOSIS — R92.8 ABNORMAL MAMMOGRAM: ICD-10-CM

## 2020-09-08 PROCEDURE — 77065 DX MAMMO INCL CAD UNI: CPT

## 2020-09-16 ENCOUNTER — HOSPITAL ENCOUNTER (OUTPATIENT)
Dept: MRI IMAGING | Age: 55
Discharge: HOME | End: 2020-09-16
Attending: OBSTETRICS & GYNECOLOGY
Payer: COMMERCIAL

## 2020-09-16 DIAGNOSIS — R92.8 ABNORMAL MAMMOGRAM: ICD-10-CM

## 2020-09-16 PROCEDURE — 77049 MRI BREAST C-+ W/CAD BI: CPT

## 2020-09-16 PROCEDURE — 74011000258 HC RX REV CODE- 258

## 2020-09-16 PROCEDURE — 74011250636 HC RX REV CODE- 250/636

## 2020-09-16 PROCEDURE — A9585 GADOBUTROL INJECTION: HCPCS

## 2020-09-16 RX ORDER — SODIUM CHLORIDE 0.9 % (FLUSH) 0.9 %
10 SYRINGE (ML) INJECTION
Status: COMPLETED | OUTPATIENT
Start: 2020-09-16 | End: 2020-09-16

## 2020-09-16 RX ADMIN — SODIUM CHLORIDE 100 ML: 900 INJECTION, SOLUTION INTRAVENOUS at 08:30

## 2020-09-16 RX ADMIN — GADOBUTROL 6 ML: 604.72 INJECTION INTRAVENOUS at 08:30

## 2020-09-16 RX ADMIN — Medication 10 ML: at 08:31

## 2020-09-17 ENCOUNTER — TELEPHONE (OUTPATIENT)
Dept: MRI IMAGING | Age: 55
End: 2020-09-17

## 2020-09-17 NOTE — TELEPHONE ENCOUNTER
Called Ms. Dana to discuss MRI results and recommendations and to discuss appointment times. She is being set up for breast US and possible biopsy next Thursday, 9/24 at 8:30am at [address] [place] [address], and I made her an appointment with Dr. Rico Bean at [facility] in [place] on 9/30 at [facility]. She was appreciative.

## 2020-09-17 NOTE — TELEPHONE ENCOUNTER
Called Dr. Allan Oliveira office requesting order for 2nd look breast US and breast biopsy, and referral to breast surgeon. Orders will be faxed.

## 2020-09-25 ENCOUNTER — HOSPITAL ENCOUNTER (OUTPATIENT)
Dept: MAMMOGRAPHY | Age: 55
Discharge: HOME | End: 2020-09-25
Attending: OBSTETRICS & GYNECOLOGY
Payer: COMMERCIAL

## 2020-09-25 DIAGNOSIS — N63.10 MASS OF BREAST, RIGHT: ICD-10-CM

## 2020-09-25 PROCEDURE — 76642 ULTRASOUND BREAST LIMITED: CPT

## 2020-09-25 PROCEDURE — 88360 TUMOR IMMUNOHISTOCHEM/MANUAL: CPT

## 2020-09-25 PROCEDURE — 88305 TISSUE EXAM BY PATHOLOGIST: CPT

## 2020-09-25 PROCEDURE — 19084 BX BREAST ADD LESION US IMAG: CPT

## 2020-09-25 PROCEDURE — 77030003503 HC NDL BIOP TISS BD -B

## 2020-09-25 PROCEDURE — 77065 DX MAMMO INCL CAD UNI: CPT

## 2020-09-25 PROCEDURE — 74011000250 HC RX REV CODE- 250: Performed by: RADIOLOGY

## 2020-09-25 PROCEDURE — 19083 BX BREAST 1ST LESION US IMAG: CPT

## 2020-09-25 PROCEDURE — 88341 IMHCHEM/IMCYTCHM EA ADD ANTB: CPT

## 2020-09-25 PROCEDURE — 88342 IMHCHEM/IMCYTCHM 1ST ANTB: CPT

## 2020-09-25 RX ORDER — LIDOCAINE HYDROCHLORIDE AND EPINEPHRINE 10; 10 MG/ML; UG/ML
10 INJECTION, SOLUTION INFILTRATION; PERINEURAL ONCE
Status: COMPLETED | OUTPATIENT
Start: 2020-09-25 | End: 2020-09-25

## 2020-09-25 RX ORDER — LIDOCAINE HYDROCHLORIDE 10 MG/ML
5 INJECTION INFILTRATION; PERINEURAL
Status: COMPLETED | OUTPATIENT
Start: 2020-09-25 | End: 2020-09-25

## 2020-09-25 RX ADMIN — LIDOCAINE HYDROCHLORIDE,EPINEPHRINE BITARTRATE 100 MG: 10; .01 INJECTION, SOLUTION INFILTRATION; PERINEURAL at 12:25

## 2020-09-25 RX ADMIN — LIDOCAINE HYDROCHLORIDE 5 ML: 10 INJECTION, SOLUTION INFILTRATION; PERINEURAL at 12:35

## 2020-09-25 RX ADMIN — LIDOCAINE HYDROCHLORIDE 5 ML: 10 INJECTION, SOLUTION INFILTRATION; PERINEURAL at 12:25

## 2020-09-25 RX ADMIN — LIDOCAINE HYDROCHLORIDE,EPINEPHRINE BITARTRATE 100 MG: 10; .01 INJECTION, SOLUTION INFILTRATION; PERINEURAL at 12:35

## 2020-09-25 NOTE — PROGRESS NOTES
Patient tolerated right breast biopsy x 2 sites well with scant bleeding. Biopsy sites were bandaged in the usual fashion and discharge instructions were reviewed with the patient. She was provided with a written copy as well. Advised patient that results should be available by Tuesday and that she will receive a phone call in the afternoon. Encouraged her to call with any questions or concerns.

## 2020-09-30 ENCOUNTER — OFFICE VISIT (OUTPATIENT)
Dept: SURGERY | Age: 55
End: 2020-09-30
Payer: COMMERCIAL

## 2020-09-30 VITALS
BODY MASS INDEX: 22.66 KG/M2 | WEIGHT: 136 LBS | TEMPERATURE: 96.9 F | DIASTOLIC BLOOD PRESSURE: 88 MMHG | HEART RATE: 72 BPM | SYSTOLIC BLOOD PRESSURE: 134 MMHG | HEIGHT: 65 IN

## 2020-09-30 DIAGNOSIS — D05.11 DUCTAL CARCINOMA IN SITU (DCIS) OF RIGHT BREAST: Primary | ICD-10-CM

## 2020-09-30 PROCEDURE — 99205 OFFICE O/P NEW HI 60 MIN: CPT | Performed by: SURGERY

## 2020-09-30 NOTE — PATIENT INSTRUCTIONS

## 2020-09-30 NOTE — PROGRESS NOTES
HISTORY OF PRESENT ILLNESS  Elida Farrar is a 48 y.o. female. HPI NEW Patient presents for consultation at the request of [facility] for newly diagnosed RIGHT breast cancer. She had an abnormal mammogram which led to a breast MRI and subsequent biopsy. No pain at biopsy site. The patient has breast implants. 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 Breast, right 10:00, needle biopsy: At least ductal carcinoma in situ (DCIS), favor grade 2-3, Microcalcifications identified, Background chronic mastitis. Family history-  Paternal GM had breast cancer, diagnosed late 63's. Maternal aunt, 52's  Maternal aunt, 52's. Breast imaging-  [facility] Results (most recent):  Results from Hospital Encounter encounter on 09/25/20   [facility] POST BX IMAGING RT INCL CAD    Narrative STUDY:  ULTRASOUND-GUIDED CORE NEEDLE BIOPSY OF THE RIGHT BREAST x2    INDICATION: 65-year-old female with abnormal mammogram and MRI demonstrating  segmental calcifications and nonmass enhancement, respectively, throughout the  upper outer right breast. She presents for 2-site ultrasound-guided biopsy of 2  distinct areas of ductal abnormalities. Of note, given the large submuscular  implants and small native breast size, MRI guided biopsy is not possible, and  stereotactic biopsy would be, at best, highly challenging. PROCEDURE:    The risks and benefits of the procedure were explained to the patient, questions  were answered, and informed consent was obtained. Site A: Right breast at 12:00  The ductal abnormality at 12 o'clock in the right breast was localized with  ultrasound. The breast was prepped in the usual sterile manner. Following the  administration of a local anesthetic and dermatotomy, and using ultrasound  guidance,  an 18 gauge Temno core needle was advanced to the lesion, and 3 cores  were obtained.  Pre and post-fire images confirmed accurate needle trajectory. A metallic clip was placed at the biopsy site. Post-biopsy digital mammogram  confirms the presence of the clip at the intended biopsy site. The patient  tolerated the procedure well without complication. Site B: Right breast at 10:00  The ductal abnormality at 10 o'clock in the right breast was localized with  ultrasound. The breast was prepped in the usual sterile manner. Following the  administration of a local anesthetic and dermatotomy, and using ultrasound  guidance,  an 18 gauge Temno core needle was advanced to the lesion, and 3 cores  were obtained. Pre and post-fire images confirmed accurate needle trajectory. A metallic clip was placed at the biopsy site. Post-biopsy digital mammogram  confirms the presence of the clip at the intended biopsy site. The patient  tolerated the procedure well without complication. Of note, postprocedure clip films are technically limited, given the large  implant and small breast size; of note, calcifications have never been well  imaged on CC views, but only on ML and MLO views. Final pathology is pending. Impression IMPRESSION:     Successful ultrasound-guided biopsy x2, of 2 representative areas in the upper  outer right breast, yielding cores submitted for histologic analysis. A clip  was placed at each biopsy site. Post-biopsy digital mammogram confirms the  presence of each clip at the intended biopsy site. Further recommendations will  be based on final pathology results.     2 representative sites were biopsied, in an effort to confirm highly suspected  extensive high-grade DCIS throughout the upper outer quadrant of the right  breast.   Site A: Right breast at 12:00  Site B: Right breast at 10:00     Past Medical History:   Diagnosis Date    Diabetes ([facility] [address] 2017    Type 1 diabetes    Menopause      Past Surgical History:   Procedure Laterality Date    HX  SECTION Bilateral     IMPLANT BREAST SILICONE/EQ Bilateral     2008     Social History     Socioeconomic History    Marital status:      Spouse name: Not on file    Number of children: Not on file    Years of education: Not on file    Highest education level: Not on file   Occupational History    Not on file   Social Needs    Financial resource strain: Not on file    Food insecurity     Worry: Not on file     Inability: Not on file    Transportation needs     Medical: Not on file     Non-medical: Not on file   Tobacco Use    Smoking status: Never Smoker    Smokeless tobacco: Never Used   Substance and Sexual Activity    Alcohol use: Yes     Alcohol/week: 1.0 standard drinks     Types: 1 Glasses of wine per week     Comment: social    Drug use: No    Sexual activity: Never   Lifestyle    Physical activity     Days per week: Not on file     Minutes per session: Not on file    Stress: Not on file   Relationships    Social connections     Talks on phone: Not on file     Gets together: Not on file     Attends Denominational service: Not on file     Active member of club or organization: Not on file     Attends meetings of clubs or organizations: Not on file     Relationship status: Not on file    Intimate partner violence     Fear of current or ex partner: Not on file     Emotionally abused: Not on file     Physically abused: Not on file     Forced sexual activity: Not on file   Other Topics Concern    Not on file   Social History Narrative    Not on file     OB History    No obstetric history on file. Obstetric Comments   Menarche:  15. LMP: 2017. # of Children:  2. Age at Delivery of First Child:  36.   Hysterectomy/oophorectomy:  NO/NO. Breast Bx:  No prior. Hx of Breast Feeding:  yes. BCP:  yes.  Hormone therapy:  no.                  Current Outpatient Medications:     insulin glargine (Basaglar KwikPen U-100 Insulin) 100 unit/mL (3 mL) inpn, 8 units every day, Disp: 15 mL, Rfl: 3   lancets (BD Ultra Fine Lancets) 33 gauge misc, Check blood sugar 4 times per day, Disp: 400 Lancet, Rfl: 3    glucose blood VI test strips (Accu-Chek Indio Plus test strp) strip, CHECK SUGARS 4 TIMES PER DAY, Disp: 400 Strip, Rfl: 3    NovoLOG Flexpen U-100 Insulin 100 unit/mL (3 mL) inpn, Inject 2-10 units up to 3 times daily with higher carb meal., Disp: 15 mL, Rfl: 6    Gauri Pen Needle 32 gauge x 5/32\" ndle, 4 shots daily, Disp: 400 Pen Needle, Rfl: 3    IBUPROFEN PO, Take  by mouth., Disp: , Rfl:     Blood-Glucose Meter (ACCU-CHEK INDIO PLUS METER) misc, Check sugars 4 times per day, Disp: 1 Each, Rfl: 0    acyclovir (ZOVIRAX) 200 mg capsule, TAKE ONE CAPSULE BY MOUTH 5 TIMES A DAY, Disp: , Rfl:     multivitamin (ONE A DAY) tablet, Take 1 Tab by mouth daily. , Disp: , Rfl:   Allergies   Allergen Reactions    Tree Nut Anaphylaxis     Not true allergy. Peanut sensitivity via allergy testing     Review of Systems   Constitutional: Negative. HENT: Negative. Eyes: Negative. Respiratory: Negative. Cardiovascular: Negative. Gastrointestinal: Negative. Genitourinary: Negative. Musculoskeletal: Negative. Skin: Negative. Neurological: Negative. Endo/Heme/Allergies: Negative. Psychiatric/Behavioral: Negative. All other systems reviewed and are negative. Physical Exam  Vitals signs and nursing note reviewed. Constitutional:       Appearance: She is well-developed. HENT:      Head: Normocephalic. Neck:      Musculoskeletal: Neck supple. Thyroid: No thyromegaly. Cardiovascular:      Rate and Rhythm: Normal rate and regular rhythm. Heart sounds: Normal heart sounds. Pulmonary:      Effort: Pulmonary effort is normal.      Breath sounds: Normal breath sounds. Chest:      Breasts: Breasts are symmetrical.         Right: No inverted nipple, mass, nipple discharge, skin change or tenderness.          Left: No inverted nipple, mass, nipple discharge, skin change or tenderness. Comments: Bilateral implants intact   Abdominal:      Palpations: Abdomen is soft. Musculoskeletal: Normal range of motion. Lymphadenopathy:      Cervical: No cervical adenopathy. Upper Body:      Right upper body: No supraclavicular, axillary or pectoral adenopathy. Left upper body: No supraclavicular, axillary or pectoral adenopathy. Skin:     General: Skin is warm and dry. Neurological:      Mental Status: She is alert and oriented to person, place, and time. ASSESSMENT and PLAN    ICD-10-CM ICD-9-CM    1. Ductal carcinoma in situ (DCIS) of right breast  D05.11 233.0      49 yo female with right breast multicentric DCIS Er/Pr negative, Her 2 anna negative. Grade 2-3. Stage 0  She is here with her . I have reviewed the imaging and pathology with her and she was given copies of these reports. 90 minutes were spent face-to-face with the patient during this encounter and 90% of that time was spent on counseling and coordination of care. 1. Discussed lumpectomy and radiation vs mastectomy. Discussed reconstruction. 2. Discussed sentinel lymph node biopsy. 3. Discussed external beam radiation. 4. Discussed hormone therapy. 5. Discussed the possibility of chemotherapy. Patient will need a skin and nipple sparing mastectomy, reconstruction. Will refer to plastics. She may consider bilateral.   Will schedule once sees plastics.

## 2020-09-30 NOTE — LETTER
9/30/20 Patient: Milly Dinero YOB: 1965 Date of Visit: 9/30/2020 Areli Cueto MD 
[address] [place] [address] [address] [address] VIA Facsimile: [phone] Dear Areli Cueto MD, Thank you for referring Ms. Ugo Carpenter to [address] MAIN OFFICE [address] [facility] for evaluation. My notes for this consultation are attached. If you have questions, please do not hesitate to call me. I look forward to following your patient along with you. Sincerely, My Shaw MD

## 2020-10-05 ENCOUNTER — DOCUMENTATION ONLY (OUTPATIENT)
Dept: SURGERY | Age: 55
End: 2020-10-05

## 2020-10-05 DIAGNOSIS — D05.91 CARCINOMA IN SITU OF RIGHT BREAST, UNSPECIFIED TYPE: Primary | ICD-10-CM

## 2020-10-05 NOTE — PROGRESS NOTES
Called to get appointment with Dr Saira Sheth. Will call me back with this information. Date & time.

## 2020-10-15 ENCOUNTER — TRANSCRIBE ORDER (OUTPATIENT)
Dept: SCHEDULING | Age: 55
End: 2020-10-15

## 2020-10-15 DIAGNOSIS — D05.11 DUCTAL CARCINOMA IN SITU OF RIGHT BREAST: Primary | ICD-10-CM

## 2020-10-19 ENCOUNTER — HOSPITAL ENCOUNTER (OUTPATIENT)
Dept: PREADMISSION TESTING | Age: 55
Discharge: HOME | End: 2020-10-19
Payer: COMMERCIAL

## 2020-10-19 ENCOUNTER — OFFICE VISIT (OUTPATIENT)
Dept: SURGERY | Age: 55
End: 2020-10-19
Payer: COMMERCIAL

## 2020-10-19 VITALS
HEART RATE: 61 BPM | TEMPERATURE: 97.7 F | WEIGHT: 138.45 LBS | HEIGHT: 65 IN | BODY MASS INDEX: 23.07 KG/M2 | SYSTOLIC BLOOD PRESSURE: 145 MMHG | DIASTOLIC BLOOD PRESSURE: 88 MMHG

## 2020-10-19 VITALS
SYSTOLIC BLOOD PRESSURE: 119 MMHG | HEIGHT: 65 IN | BODY MASS INDEX: 22.99 KG/M2 | TEMPERATURE: 97.8 F | HEART RATE: 67 BPM | DIASTOLIC BLOOD PRESSURE: 73 MMHG | WEIGHT: 138 LBS

## 2020-10-19 DIAGNOSIS — D05.11 DUCTAL CARCINOMA IN SITU (DCIS) OF RIGHT BREAST: Primary | ICD-10-CM

## 2020-10-19 LAB
ANION GAP SERPL CALC-SCNC: 6 MMOL/L (ref 5–15)
ATRIAL RATE: 60 BPM
BASOPHILS # BLD: 0 K/UL (ref 0–0.1)
BASOPHILS NFR BLD: 0 % (ref 0–1)
BUN SERPL-MCNC: 6 MG/DL (ref 6–20)
BUN/CREAT SERPL: 8 (ref 12–20)
CALCIUM SERPL-MCNC: 9.1 MG/DL (ref 8.5–10.1)
CALCULATED P AXIS, ECG09: 15 DEGREES
CALCULATED R AXIS, ECG10: -32 DEGREES
CALCULATED T AXIS, ECG11: 41 DEGREES
CHLORIDE SERPL-SCNC: 103 MMOL/L (ref 97–108)
CO2 SERPL-SCNC: 29 MMOL/L (ref 21–32)
CREAT SERPL-MCNC: 0.76 MG/DL (ref 0.55–1.02)
DIAGNOSIS, 93000: NORMAL
DIFFERENTIAL METHOD BLD: ABNORMAL
EOSINOPHIL # BLD: 0.1 K/UL (ref 0–0.4)
EOSINOPHIL NFR BLD: 2 % (ref 0–7)
ERYTHROCYTE [DISTWIDTH] IN BLOOD BY AUTOMATED COUNT: 11.2 % (ref 11.5–14.5)
GLUCOSE SERPL-MCNC: 124 MG/DL (ref 65–100)
HCT VFR BLD AUTO: 35.9 % (ref 35–47)
HGB BLD-MCNC: 12.2 G/DL (ref 11.5–16)
IMM GRANULOCYTES # BLD AUTO: 0 K/UL (ref 0–0.04)
IMM GRANULOCYTES NFR BLD AUTO: 0 % (ref 0–0.5)
LYMPHOCYTES # BLD: 1.2 K/UL (ref 0.8–3.5)
LYMPHOCYTES NFR BLD: 25 % (ref 12–49)
MCH RBC QN AUTO: 34.1 PG (ref 26–34)
MCHC RBC AUTO-ENTMCNC: 34 G/DL (ref 30–36.5)
MCV RBC AUTO: 100.3 FL (ref 80–99)
MONOCYTES # BLD: 0.5 K/UL (ref 0–1)
MONOCYTES NFR BLD: 9 % (ref 5–13)
NEUTS SEG # BLD: 3.2 K/UL (ref 1.8–8)
NEUTS SEG NFR BLD: 64 % (ref 32–75)
NRBC # BLD: 0 K/UL (ref 0–0.01)
NRBC BLD-RTO: 0 PER 100 WBC
P-R INTERVAL, ECG05: 134 MS
PLATELET # BLD AUTO: 211 K/UL (ref 150–400)
PMV BLD AUTO: 10.8 FL (ref 8.9–12.9)
POTASSIUM SERPL-SCNC: 4.3 MMOL/L (ref 3.5–5.1)
Q-T INTERVAL, ECG07: 424 MS
QRS DURATION, ECG06: 98 MS
QTC CALCULATION (BEZET), ECG08: 424 MS
RBC # BLD AUTO: 3.58 M/UL (ref 3.8–5.2)
SODIUM SERPL-SCNC: 138 MMOL/L (ref 136–145)
VENTRICULAR RATE, ECG03: 60 BPM
WBC # BLD AUTO: 5 K/UL (ref 3.6–11)

## 2020-10-19 PROCEDURE — 80048 BASIC METABOLIC PNL TOTAL CA: CPT

## 2020-10-19 PROCEDURE — 85025 COMPLETE CBC W/AUTO DIFF WBC: CPT

## 2020-10-19 PROCEDURE — 99215 OFFICE O/P EST HI 40 MIN: CPT | Performed by: SURGERY

## 2020-10-19 PROCEDURE — 93005 ELECTROCARDIOGRAM TRACING: CPT

## 2020-10-19 RX ORDER — INSULIN GLARGINE 100 [IU]/ML
INJECTION, SOLUTION SUBCUTANEOUS
Qty: 15 ML | Refills: 3 | Status: SHIPPED | OUTPATIENT
Start: 2020-10-19 | End: 2020-11-10

## 2020-10-19 RX ORDER — FEXOFENADINE HCL AND PSEUDOEPHEDRINE HCI 60; 120 MG/1; MG/1
1 TABLET, EXTENDED RELEASE ORAL EVERY 12 HOURS
COMMUNITY
End: 2021-01-06 | Stop reason: ALTCHOICE

## 2020-10-19 NOTE — PERIOP NOTES
Patient given surgical site infection information FAQs handout and hand hygiene tips sheet. Pre-operative instructions reviewed and patient verbalizes understanding of instructions. Patient has been given the opportunity to ask additional questions. PATIENT GIVEN 2 BOTTLES OF CHG SOAP TO USE DAY BEFORE SURGERY AND DOS. INSTRUCTIONS PROVIDED. Pre-Operative Instructions    DO NOT EAT OR DRINK ANYTHING AFTER MIDNIGHT THE NIGHT BEFORE SURGERY. PT WILL BE SCHEDULED FOR COVID TESTING.

## 2020-10-19 NOTE — H&P (VIEW-ONLY)
HISTORY OF PRESENT ILLNESS Deneise Apgar is a 48 y.o. female. HPI ESTABLISHED patient here today to discuss her surgery for 10/27/20 for BILATERAL nipple sparing mastectomies. She is accompanied by her  due to having more questions regarding the procedure. She has seen Dr. Marisa Essex for plastic surgery. FINAL PATHOLOGIC DIAGNOSIS 1. Breast, right 12:00, needle biopsy:  
Ductal carcinoma in situ (DCIS), favor grade 2-3 See comment 2. Breast, right 10:00, needle biopsy: At least ductal carcinoma in situ (DCIS), favor grade 2-3 Microcalcifications identified Background chronic mastitis See comment Comment Histopathologic evaluation of this case is limited by the small volume of tumor cells. Specimens 1 and 2 contain small foci of DCIS as well as detached fragments of atypical epithelial cells. P63 stains on both specimens show residual myoepithelial cells, supporting the diagnosis of DCIS. Definitive invasive carcinoma is not seen. Please correlate with clinical and radiographic findings. Review of Systems All other systems reviewed and are negative. Physical Exam 
Vitals signs and nursing note reviewed. Chest:  
   Breasts: Breasts are symmetrical.  
      Right: No inverted nipple, mass, nipple discharge, skin change or tenderness. Left: No inverted nipple, mass, nipple discharge, skin change or tenderness. Lymphadenopathy:  
   Cervical: No cervical adenopathy. ASSESSMENT and PLAN 
  ICD-10-CM ICD-9-CM 1. Ductal carcinoma in situ (DCIS) of right breast  D05.11 233.0   
 
49 yo female with right breast DCIS multicentric here to discuss surgery Planned surgery is bilateral skin and nipple sparing mastectomies, right sln biopsy Reconstruction by Dr. Benjamin Diaz. Discussed my part of procedure. Will do back of nipple biopsies and if involved will need to remove nipple. Discussed recovery of 4-6 weeks Discussed restrictions 4-6 weeks Overnight hospital stay for obs. Her surgery is scheduled. 60  minutes were spent face-to-face with the patient during this encounter and 90% of that time was spent on counseling and coordination of care.

## 2020-10-19 NOTE — PROGRESS NOTES
HISTORY OF PRESENT ILLNESS  Maren Wilson is a 48 y.o. female. HPI ESTABLISHED patient here today to discuss her surgery for 10/27/20 for BILATERAL nipple sparing mastectomies. She is accompanied by her  due to having more questions regarding the procedure. She has seen Dr. Enma Daniels for plastic surgery. FINAL PATHOLOGIC DIAGNOSIS   1. Breast, right 12:00, needle biopsy:   Ductal carcinoma in situ (DCIS), favor grade 2-3   See comment   2. Breast, right 10:00, needle biopsy: At least ductal carcinoma in situ (DCIS), favor grade 2-3   Microcalcifications identified   Background chronic mastitis   See comment   Comment   Histopathologic evaluation of this case is limited by the small volume of tumor cells. Specimens 1 and 2 contain small foci of DCIS as well as detached fragments of atypical epithelial cells. P63 stains on both specimens show residual myoepithelial cells, supporting the diagnosis of DCIS. Definitive invasive carcinoma is not seen. Please correlate with clinical and radiographic findings. Review of Systems   All other systems reviewed and are negative. Physical Exam  Vitals signs and nursing note reviewed. Chest:      Breasts: Breasts are symmetrical.         Right: No inverted nipple, mass, nipple discharge, skin change or tenderness. Left: No inverted nipple, mass, nipple discharge, skin change or tenderness. Lymphadenopathy:      Cervical: No cervical adenopathy. ASSESSMENT and PLAN    ICD-10-CM ICD-9-CM    1. Ductal carcinoma in situ (DCIS) of right breast  D05.11 233.0      49 yo female with right breast DCIS multicentric here to discuss surgery  Planned surgery is bilateral skin and nipple sparing mastectomies, right sln biopsy  Reconstruction by Dr. Ivan Pace. Discussed my part of procedure. Will do back of nipple biopsies and if involved will need to remove nipple.   Discussed recovery of 4-6 weeks  Discussed restrictions 4-6 weeks  Overnight hospital stay for obs. Her surgery is scheduled. 60  minutes were spent face-to-face with the patient during this encounter and 90% of that time was spent on counseling and coordination of care.

## 2020-10-19 NOTE — PATIENT INSTRUCTIONS

## 2020-10-19 NOTE — LETTER
10/20/20 Patient: Hola Cabezas YOB: 1965 Date of Visit: 10/19/2020 Michelle Sahu MD 
[address] [place] [address] [address] [address] VIA Facsimile: [phone] Jeramie Oneil MD 
[address] [place] [address] [address] [facility] [address] VIA In Basket Dear MD Jeramie Bocanegra MD, Thank you for referring Ms. Luisana Perez to [address] [place] [address] for evaluation. My notes for this consultation are attached. If you have questions, please do not hesitate to call me. I look forward to following your patient along with you. Sincerely, Megha Helm MD

## 2020-10-23 ENCOUNTER — HOSPITAL ENCOUNTER (OUTPATIENT)
Dept: PREADMISSION TESTING | Age: 55
Discharge: HOME | End: 2020-10-23
Payer: COMMERCIAL

## 2020-10-23 ENCOUNTER — TRANSCRIBE ORDER (OUTPATIENT)
Dept: REGISTRATION | Age: 55
End: 2020-10-23

## 2020-10-23 DIAGNOSIS — Z01.812 PRE-PROCEDURE LAB EXAM: Primary | ICD-10-CM

## 2020-10-23 DIAGNOSIS — Z01.812 PRE-PROCEDURE LAB EXAM: ICD-10-CM

## 2020-10-23 PROCEDURE — 87635 SARS-COV-2 COVID-19 AMP PRB: CPT

## 2020-10-24 LAB — SARS-COV-2, COV2NT: NOT DETECTED

## 2020-10-27 ENCOUNTER — ANESTHESIA (OUTPATIENT)
Dept: MEDSURG UNIT | Age: 55
End: 2020-10-27
Payer: COMMERCIAL

## 2020-10-27 ENCOUNTER — APPOINTMENT (OUTPATIENT)
Dept: NUCLEAR MEDICINE | Age: 55
End: 2020-10-27
Attending: SURGERY
Payer: COMMERCIAL

## 2020-10-27 ENCOUNTER — HOSPITAL ENCOUNTER (OUTPATIENT)
Age: 55
Setting detail: OBSERVATION
Discharge: HOME | End: 2020-10-28
Attending: SURGERY | Admitting: PLASTIC SURGERY
Payer: COMMERCIAL

## 2020-10-27 ENCOUNTER — ANESTHESIA EVENT (OUTPATIENT)
Dept: MEDSURG UNIT | Age: 55
End: 2020-10-27
Payer: COMMERCIAL

## 2020-10-27 DIAGNOSIS — Z90.13 S/P MASTECTOMY, BILATERAL: Primary | ICD-10-CM

## 2020-10-27 DIAGNOSIS — D05.11 DUCTAL CARCINOMA IN SITU OF RIGHT BREAST: ICD-10-CM

## 2020-10-27 LAB
GLUCOSE BLD STRIP.AUTO-MCNC: 115 MG/DL (ref 65–100)
GLUCOSE BLD STRIP.AUTO-MCNC: 134 MG/DL (ref 65–100)
GLUCOSE BLD STRIP.AUTO-MCNC: 136 MG/DL (ref 65–100)
GLUCOSE BLD STRIP.AUTO-MCNC: 150 MG/DL (ref 65–100)
SERVICE CMNT-IMP: ABNORMAL

## 2020-10-27 PROCEDURE — 74011250636 HC RX REV CODE- 250/636: Performed by: NURSE ANESTHETIST, CERTIFIED REGISTERED

## 2020-10-27 PROCEDURE — 77030040361 HC SLV COMPR DVT MDII -B: Performed by: SURGERY

## 2020-10-27 PROCEDURE — 77030019702 HC WRP THER MENM -C: Performed by: SURGERY

## 2020-10-27 PROCEDURE — 74011250636 HC RX REV CODE- 250/636: Performed by: ANESTHESIOLOGY

## 2020-10-27 PROCEDURE — 82962 GLUCOSE BLOOD TEST: CPT

## 2020-10-27 PROCEDURE — 99218 HC RM OBSERVATION: CPT

## 2020-10-27 PROCEDURE — 2709999900 HC NON-CHARGEABLE SUPPLY: Performed by: SURGERY

## 2020-10-27 PROCEDURE — 77030041528 HC RETCTR RADLUX LGHTD MEDT -C: Performed by: SURGERY

## 2020-10-27 PROCEDURE — 77030002986 HC SUT PROL J&J -A: Performed by: SURGERY

## 2020-10-27 PROCEDURE — 77030015926 HC DEV TISS SEAL J&J -E: Performed by: SURGERY

## 2020-10-27 PROCEDURE — 77030011267 HC ELECTRD BLD COVD -A: Performed by: SURGERY

## 2020-10-27 PROCEDURE — 77030026438 HC STYL ET INTUB CARD -A: Performed by: ANESTHESIOLOGY

## 2020-10-27 PROCEDURE — 78195 LYMPH SYSTEM IMAGING: CPT

## 2020-10-27 PROCEDURE — 77030011266 HC ELECTRD BLD INSL COVD -A: Performed by: SURGERY

## 2020-10-27 PROCEDURE — 77030034626 HC LIGASURE SM JAW SEAL OPN SURG COVD -E: Performed by: SURGERY

## 2020-10-27 PROCEDURE — 74011250637 HC RX REV CODE- 250/637: Performed by: PLASTIC SURGERY

## 2020-10-27 PROCEDURE — C9290 INJ, BUPIVACAINE LIPOSOME: HCPCS | Performed by: SURGERY

## 2020-10-27 PROCEDURE — 77030026227 HC FUNL KELLR 2 PCH ALGN -C: Performed by: SURGERY

## 2020-10-27 PROCEDURE — 77030002966 HC SUT PDS J&J -A: Performed by: SURGERY

## 2020-10-27 PROCEDURE — 19303 MAST SIMPLE COMPLETE: CPT | Performed by: SURGERY

## 2020-10-27 PROCEDURE — 88331 PATH CONSLTJ SURG 1 BLK 1SPC: CPT

## 2020-10-27 PROCEDURE — 77030002996 HC SUT SLK J&J -A: Performed by: SURGERY

## 2020-10-27 PROCEDURE — 77030011825 HC SUPP SURG PSTOP S2SG -B: Performed by: SURGERY

## 2020-10-27 PROCEDURE — 88342 IMHCHEM/IMCYTCHM 1ST ANTB: CPT

## 2020-10-27 PROCEDURE — 77030040506 HC DRN WND MDII -A: Performed by: SURGERY

## 2020-10-27 PROCEDURE — 74011250636 HC RX REV CODE- 250/636: Performed by: PLASTIC SURGERY

## 2020-10-27 PROCEDURE — 74011250637 HC RX REV CODE- 250/637: Performed by: NURSE ANESTHETIST, CERTIFIED REGISTERED

## 2020-10-27 PROCEDURE — 77030008684 HC TU ET CUF COVD -B: Performed by: ANESTHESIOLOGY

## 2020-10-27 PROCEDURE — 77030013674 HC FIL EXPND TISS ALGN -A: Performed by: SURGERY

## 2020-10-27 PROCEDURE — 88307 TISSUE EXAM BY PATHOLOGIST: CPT

## 2020-10-27 PROCEDURE — 74011250636 HC RX REV CODE- 250/636: Performed by: SURGERY

## 2020-10-27 PROCEDURE — 74011000258 HC RX REV CODE- 258: Performed by: PLASTIC SURGERY

## 2020-10-27 PROCEDURE — 74011000250 HC RX REV CODE- 250: Performed by: NURSE ANESTHETIST, CERTIFIED REGISTERED

## 2020-10-27 PROCEDURE — 77030041680 HC PNCL ELECSURG SMK EVAC CNMD -B: Performed by: SURGERY

## 2020-10-27 PROCEDURE — 77030010507 HC ADH SKN DERMBND J&J -B: Performed by: SURGERY

## 2020-10-27 PROCEDURE — 74011000272 HC RX REV CODE- 272: Performed by: SURGERY

## 2020-10-27 PROCEDURE — 76210000035 HC AMBSU PH I REC 1 TO 1.5 HR: Performed by: SURGERY

## 2020-10-27 PROCEDURE — 38525 BIOPSY/REMOVAL LYMPH NODES: CPT | Performed by: SURGERY

## 2020-10-27 PROCEDURE — 77030018836 HC SOL IRR NACL ICUM -A: Performed by: SURGERY

## 2020-10-27 PROCEDURE — 76060000069 HC AMB SURG ANES 4.5 TO 5 HR: Performed by: SURGERY

## 2020-10-27 PROCEDURE — 77030020263 HC SOL INJ SOD CL0.9% LFCR 1000ML: Performed by: SURGERY

## 2020-10-27 PROCEDURE — 77030002916 HC SUT ETHLN J&J -A: Performed by: SURGERY

## 2020-10-27 PROCEDURE — 88360 TUMOR IMMUNOHISTOCHEM/MANUAL: CPT

## 2020-10-27 PROCEDURE — 77030040504 HC DRN WND MDII -B: Performed by: SURGERY

## 2020-10-27 PROCEDURE — 77030040830 HC CATH URETH FOL MDII -A: Performed by: SURGERY

## 2020-10-27 PROCEDURE — 74011000250 HC RX REV CODE- 250: Performed by: SURGERY

## 2020-10-27 PROCEDURE — 77030008462 HC STPLR SKN PROX J&J -A: Performed by: SURGERY

## 2020-10-27 PROCEDURE — 88305 TISSUE EXAM BY PATHOLOGIST: CPT

## 2020-10-27 PROCEDURE — 77030002933 HC SUT MCRYL J&J -A: Performed by: SURGERY

## 2020-10-27 PROCEDURE — 77030037277 HC PROCTR BRST IMPL DISP ADEP -B: Performed by: SURGERY

## 2020-10-27 PROCEDURE — C1789 PROSTHESIS, BREAST, IMP: HCPCS | Performed by: SURGERY

## 2020-10-27 PROCEDURE — 76030000009 HC AMB SURG OR TIME 4.5 TO 5: Performed by: SURGERY

## 2020-10-27 DEVICE — IMPLANTABLE DEVICE: Type: IMPLANTABLE DEVICE | Site: BREAST | Status: FUNCTIONAL

## 2020-10-27 RX ORDER — HYDROMORPHONE HYDROCHLORIDE 1 MG/ML
0.2 INJECTION, SOLUTION INTRAMUSCULAR; INTRAVENOUS; SUBCUTANEOUS
Status: DISCONTINUED | OUTPATIENT
Start: 2020-10-27 | End: 2020-10-27 | Stop reason: HOSPADM

## 2020-10-27 RX ORDER — MORPHINE SULFATE 10 MG/ML
2 INJECTION, SOLUTION INTRAMUSCULAR; INTRAVENOUS
Status: DISCONTINUED | OUTPATIENT
Start: 2020-10-27 | End: 2020-10-27 | Stop reason: HOSPADM

## 2020-10-27 RX ORDER — ROCURONIUM BROMIDE 10 MG/ML
INJECTION, SOLUTION INTRAVENOUS AS NEEDED
Status: DISCONTINUED | OUTPATIENT
Start: 2020-10-27 | End: 2020-10-27 | Stop reason: HOSPADM

## 2020-10-27 RX ORDER — DIPHENHYDRAMINE HYDROCHLORIDE 50 MG/ML
12.5 INJECTION, SOLUTION INTRAMUSCULAR; INTRAVENOUS AS NEEDED
Status: DISCONTINUED | OUTPATIENT
Start: 2020-10-27 | End: 2020-10-27 | Stop reason: HOSPADM

## 2020-10-27 RX ORDER — ONDANSETRON 2 MG/ML
INJECTION INTRAMUSCULAR; INTRAVENOUS AS NEEDED
Status: DISCONTINUED | OUTPATIENT
Start: 2020-10-27 | End: 2020-10-27 | Stop reason: HOSPADM

## 2020-10-27 RX ORDER — HYDROMORPHONE HYDROCHLORIDE 2 MG/1
2-4 TABLET ORAL
Status: DISCONTINUED | OUTPATIENT
Start: 2020-10-27 | End: 2020-10-28 | Stop reason: HOSPADM

## 2020-10-27 RX ORDER — PHENYLEPHRINE HCL IN 0.9% NACL 0.4MG/10ML
SYRINGE (ML) INTRAVENOUS AS NEEDED
Status: DISCONTINUED | OUTPATIENT
Start: 2020-10-27 | End: 2020-10-27 | Stop reason: HOSPADM

## 2020-10-27 RX ORDER — BUPIVACAINE HYDROCHLORIDE 2.5 MG/ML
INJECTION, SOLUTION EPIDURAL; INFILTRATION; INTRACAUDAL AS NEEDED
Status: DISCONTINUED | OUTPATIENT
Start: 2020-10-27 | End: 2020-10-27 | Stop reason: HOSPADM

## 2020-10-27 RX ORDER — FENTANYL CITRATE 50 UG/ML
INJECTION, SOLUTION INTRAMUSCULAR; INTRAVENOUS AS NEEDED
Status: DISCONTINUED | OUTPATIENT
Start: 2020-10-27 | End: 2020-10-27 | Stop reason: HOSPADM

## 2020-10-27 RX ORDER — FENTANYL CITRATE 50 UG/ML
25 INJECTION, SOLUTION INTRAMUSCULAR; INTRAVENOUS
Status: DISCONTINUED | OUTPATIENT
Start: 2020-10-27 | End: 2020-10-27 | Stop reason: HOSPADM

## 2020-10-27 RX ORDER — CEFAZOLIN SODIUM 1 G/3ML
INJECTION, POWDER, FOR SOLUTION INTRAMUSCULAR; INTRAVENOUS AS NEEDED
Status: DISCONTINUED | OUTPATIENT
Start: 2020-10-27 | End: 2020-10-27 | Stop reason: HOSPADM

## 2020-10-27 RX ORDER — GLYCOPYRROLATE 0.2 MG/ML
INJECTION INTRAMUSCULAR; INTRAVENOUS AS NEEDED
Status: DISCONTINUED | OUTPATIENT
Start: 2020-10-27 | End: 2020-10-27 | Stop reason: HOSPADM

## 2020-10-27 RX ORDER — MIDAZOLAM HYDROCHLORIDE 1 MG/ML
1 INJECTION, SOLUTION INTRAMUSCULAR; INTRAVENOUS AS NEEDED
Status: DISCONTINUED | OUTPATIENT
Start: 2020-10-27 | End: 2020-10-27 | Stop reason: HOSPADM

## 2020-10-27 RX ORDER — ONDANSETRON 2 MG/ML
4 INJECTION INTRAMUSCULAR; INTRAVENOUS AS NEEDED
Status: DISCONTINUED | OUTPATIENT
Start: 2020-10-27 | End: 2020-10-27 | Stop reason: HOSPADM

## 2020-10-27 RX ORDER — SCOLOPAMINE TRANSDERMAL SYSTEM 1 MG/1
PATCH, EXTENDED RELEASE TRANSDERMAL AS NEEDED
Status: DISCONTINUED | OUTPATIENT
Start: 2020-10-27 | End: 2020-10-27 | Stop reason: HOSPADM

## 2020-10-27 RX ORDER — OXYCODONE AND ACETAMINOPHEN 5; 325 MG/1; MG/1
1 TABLET ORAL AS NEEDED
Status: DISCONTINUED | OUTPATIENT
Start: 2020-10-27 | End: 2020-10-27 | Stop reason: HOSPADM

## 2020-10-27 RX ORDER — SODIUM CHLORIDE, SODIUM LACTATE, POTASSIUM CHLORIDE, CALCIUM CHLORIDE 600; 310; 30; 20 MG/100ML; MG/100ML; MG/100ML; MG/100ML
75 INJECTION, SOLUTION INTRAVENOUS CONTINUOUS
Status: DISCONTINUED | OUTPATIENT
Start: 2020-10-27 | End: 2020-10-27 | Stop reason: HOSPADM

## 2020-10-27 RX ORDER — MAGNESIUM SULFATE 100 %
4 CRYSTALS MISCELLANEOUS AS NEEDED
Status: DISCONTINUED | OUTPATIENT
Start: 2020-10-27 | End: 2020-10-28 | Stop reason: HOSPADM

## 2020-10-27 RX ORDER — PROPOFOL 10 MG/ML
INJECTION, EMULSION INTRAVENOUS AS NEEDED
Status: DISCONTINUED | OUTPATIENT
Start: 2020-10-27 | End: 2020-10-27 | Stop reason: HOSPADM

## 2020-10-27 RX ORDER — SODIUM CHLORIDE 0.9 % (FLUSH) 0.9 %
5-40 SYRINGE (ML) INJECTION AS NEEDED
Status: DISCONTINUED | OUTPATIENT
Start: 2020-10-27 | End: 2020-10-27 | Stop reason: HOSPADM

## 2020-10-27 RX ORDER — SODIUM CHLORIDE 0.9 % (FLUSH) 0.9 %
5-40 SYRINGE (ML) INJECTION EVERY 8 HOURS
Status: DISCONTINUED | OUTPATIENT
Start: 2020-10-27 | End: 2020-10-27 | Stop reason: HOSPADM

## 2020-10-27 RX ORDER — LIDOCAINE HYDROCHLORIDE 20 MG/ML
INJECTION, SOLUTION EPIDURAL; INFILTRATION; INTRACAUDAL; PERINEURAL AS NEEDED
Status: DISCONTINUED | OUTPATIENT
Start: 2020-10-27 | End: 2020-10-27 | Stop reason: HOSPADM

## 2020-10-27 RX ORDER — FENTANYL CITRATE 50 UG/ML
50 INJECTION, SOLUTION INTRAMUSCULAR; INTRAVENOUS AS NEEDED
Status: DISCONTINUED | OUTPATIENT
Start: 2020-10-27 | End: 2020-10-27 | Stop reason: HOSPADM

## 2020-10-27 RX ORDER — DEXTROSE MONOHYDRATE 100 MG/ML
0-250 INJECTION, SOLUTION INTRAVENOUS AS NEEDED
Status: DISCONTINUED | OUTPATIENT
Start: 2020-10-27 | End: 2020-10-28 | Stop reason: HOSPADM

## 2020-10-27 RX ORDER — DEXAMETHASONE SODIUM PHOSPHATE 4 MG/ML
INJECTION, SOLUTION INTRA-ARTICULAR; INTRALESIONAL; INTRAMUSCULAR; INTRAVENOUS; SOFT TISSUE AS NEEDED
Status: DISCONTINUED | OUTPATIENT
Start: 2020-10-27 | End: 2020-10-27 | Stop reason: HOSPADM

## 2020-10-27 RX ORDER — ONDANSETRON 2 MG/ML
4 INJECTION INTRAMUSCULAR; INTRAVENOUS
Status: DISCONTINUED | OUTPATIENT
Start: 2020-10-27 | End: 2020-10-28 | Stop reason: HOSPADM

## 2020-10-27 RX ORDER — MIDAZOLAM HYDROCHLORIDE 1 MG/ML
INJECTION, SOLUTION INTRAMUSCULAR; INTRAVENOUS AS NEEDED
Status: DISCONTINUED | OUTPATIENT
Start: 2020-10-27 | End: 2020-10-27 | Stop reason: HOSPADM

## 2020-10-27 RX ORDER — HYDROMORPHONE HYDROCHLORIDE 2 MG/ML
INJECTION, SOLUTION INTRAMUSCULAR; INTRAVENOUS; SUBCUTANEOUS AS NEEDED
Status: DISCONTINUED | OUTPATIENT
Start: 2020-10-27 | End: 2020-10-27 | Stop reason: HOSPADM

## 2020-10-27 RX ORDER — SODIUM CHLORIDE 9 MG/ML
50 INJECTION, SOLUTION INTRAVENOUS CONTINUOUS
Status: DISCONTINUED | OUTPATIENT
Start: 2020-10-27 | End: 2020-10-27 | Stop reason: HOSPADM

## 2020-10-27 RX ORDER — LIDOCAINE HYDROCHLORIDE 10 MG/ML
0.1 INJECTION, SOLUTION EPIDURAL; INFILTRATION; INTRACAUDAL; PERINEURAL AS NEEDED
Status: DISCONTINUED | OUTPATIENT
Start: 2020-10-27 | End: 2020-10-27 | Stop reason: HOSPADM

## 2020-10-27 RX ORDER — MIDAZOLAM HYDROCHLORIDE 1 MG/ML
0.5 INJECTION, SOLUTION INTRAMUSCULAR; INTRAVENOUS
Status: DISCONTINUED | OUTPATIENT
Start: 2020-10-27 | End: 2020-10-27 | Stop reason: HOSPADM

## 2020-10-27 RX ADMIN — DEXAMETHASONE SODIUM PHOSPHATE 4 MG: 4 INJECTION, SOLUTION INTRAMUSCULAR; INTRAVENOUS at 09:07

## 2020-10-27 RX ADMIN — FENTANYL CITRATE 50 MCG: 50 INJECTION, SOLUTION INTRAMUSCULAR; INTRAVENOUS at 08:59

## 2020-10-27 RX ADMIN — HYDROMORPHONE HYDROCHLORIDE 0.5 MG: 2 INJECTION, SOLUTION INTRAMUSCULAR; INTRAVENOUS; SUBCUTANEOUS at 10:00

## 2020-10-27 RX ADMIN — FENTANYL CITRATE 50 MCG: 50 INJECTION, SOLUTION INTRAMUSCULAR; INTRAVENOUS at 09:28

## 2020-10-27 RX ADMIN — GLYCOPYRROLATE 0.2 MG: 0.2 INJECTION, SOLUTION INTRAMUSCULAR; INTRAVENOUS at 12:25

## 2020-10-27 RX ADMIN — ROCURONIUM BROMIDE 40 MG: 10 SOLUTION INTRAVENOUS at 08:59

## 2020-10-27 RX ADMIN — FENTANYL CITRATE 50 MCG: 50 INJECTION, SOLUTION INTRAMUSCULAR; INTRAVENOUS at 09:46

## 2020-10-27 RX ADMIN — SODIUM CHLORIDE, POTASSIUM CHLORIDE, SODIUM LACTATE AND CALCIUM CHLORIDE: 600; 310; 30; 20 INJECTION, SOLUTION INTRAVENOUS at 11:17

## 2020-10-27 RX ADMIN — SCOPALAMINE 1 PATCH: 1 PATCH, EXTENDED RELEASE TRANSDERMAL at 08:52

## 2020-10-27 RX ADMIN — FENTANYL CITRATE 50 MCG: 50 INJECTION, SOLUTION INTRAMUSCULAR; INTRAVENOUS at 10:17

## 2020-10-27 RX ADMIN — Medication 40 MCG: at 12:03

## 2020-10-27 RX ADMIN — CEFAZOLIN SODIUM 1 G: 1 INJECTION, POWDER, FOR SOLUTION INTRAMUSCULAR; INTRAVENOUS at 21:33

## 2020-10-27 RX ADMIN — CEFAZOLIN 2 G: 330 INJECTION, POWDER, FOR SOLUTION INTRAMUSCULAR; INTRAVENOUS at 09:06

## 2020-10-27 RX ADMIN — HYDROMORPHONE HYDROCHLORIDE 2 MG: 2 TABLET ORAL at 18:32

## 2020-10-27 RX ADMIN — LIDOCAINE HYDROCHLORIDE 60 MG: 20 INJECTION, SOLUTION EPIDURAL; INFILTRATION; INTRACAUDAL; PERINEURAL at 08:59

## 2020-10-27 RX ADMIN — SODIUM CHLORIDE, POTASSIUM CHLORIDE, SODIUM LACTATE AND CALCIUM CHLORIDE: 600; 310; 30; 20 INJECTION, SOLUTION INTRAVENOUS at 08:33

## 2020-10-27 RX ADMIN — HYDROMORPHONE HYDROCHLORIDE 0.5 MG: 2 INJECTION, SOLUTION INTRAMUSCULAR; INTRAVENOUS; SUBCUTANEOUS at 09:32

## 2020-10-27 RX ADMIN — SODIUM CHLORIDE, POTASSIUM CHLORIDE, SODIUM LACTATE AND CALCIUM CHLORIDE: 600; 310; 30; 20 INJECTION, SOLUTION INTRAVENOUS at 13:28

## 2020-10-27 RX ADMIN — ROCURONIUM BROMIDE 10 MG: 10 SOLUTION INTRAVENOUS at 09:33

## 2020-10-27 RX ADMIN — CEFAZOLIN 2 G: 330 INJECTION, POWDER, FOR SOLUTION INTRAMUSCULAR; INTRAVENOUS at 13:02

## 2020-10-27 RX ADMIN — PROPOFOL 150 MG: 10 INJECTION, EMULSION INTRAVENOUS at 08:59

## 2020-10-27 RX ADMIN — FENTANYL CITRATE 25 MCG: 50 INJECTION, SOLUTION INTRAMUSCULAR; INTRAVENOUS at 14:15

## 2020-10-27 RX ADMIN — MIDAZOLAM 2 MG: 1 INJECTION INTRAMUSCULAR; INTRAVENOUS at 08:52

## 2020-10-27 RX ADMIN — FENTANYL CITRATE 25 MCG: 50 INJECTION, SOLUTION INTRAMUSCULAR; INTRAVENOUS at 14:40

## 2020-10-27 RX ADMIN — ONDANSETRON HYDROCHLORIDE 4 MG: 2 INJECTION, SOLUTION INTRAMUSCULAR; INTRAVENOUS at 12:49

## 2020-10-27 RX ADMIN — HYDROMORPHONE HYDROCHLORIDE 0.5 MG: 2 INJECTION, SOLUTION INTRAMUSCULAR; INTRAVENOUS; SUBCUTANEOUS at 11:49

## 2020-10-27 RX ADMIN — ROCURONIUM BROMIDE 20 MG: 10 SOLUTION INTRAVENOUS at 09:58

## 2020-10-27 RX ADMIN — FENTANYL CITRATE 25 MCG: 50 INJECTION, SOLUTION INTRAMUSCULAR; INTRAVENOUS at 13:45

## 2020-10-27 NOTE — ANESTHESIA POSTPROCEDURE EVALUATION
Post-Anesthesia Evaluation and Assessment    Patient: Zay Briceño MRN: [id number]  SSN: [id number]    YOB: 1965  Age: 48 y.o. Sex: female      I have evaluated the patient and they are stable and ready for discharge from the PACU. Cardiovascular Function/Vital Signs  Visit Vitals  /77   Pulse 85   Temp 36.8 °C (98.3 °F)   Resp 12   Wt 62.6 kg (138 lb)   SpO2 95%   BMI 22.96 kg/m²       Patient is status post General anesthesia for Procedure(s):  BILATERAL SKIN & NIPPLE SPARING MASTECTOMIES  SENTINEL NODE BIOPSY  BILATERAL BREAST RECONSTRUCTION WITH GEL IMPLANTS. Nausea/Vomiting: None    Postoperative hydration reviewed and adequate. Pain:      Managed    Neurological Status:   Neuro (WDL): Within Defined Limits (10/27/20 0819)   At baseline    Mental Status, Level of Consciousness: Alert and  oriented to person, place, and time    Pulmonary Status:   O2 Device: Room air (10/27/20 1336)   Adequate oxygenation and airway patent    Complications related to anesthesia: None    Post-anesthesia assessment completed. No concerns    Signed By: Jennifer Matos MD     October 27, 2020              Procedure(s):  BILATERAL SKIN & NIPPLE SPARING MASTECTOMIES  SENTINEL NODE BIOPSY  BILATERAL BREAST RECONSTRUCTION WITH GEL IMPLANTS. general    <BSHSIANPOST>    INITIAL Post-op Vital signs:   Vitals Value Taken Time   /77 10/27/2020  1:45 PM   Temp 36.8 °C (98.3 °F) 10/27/2020  1:36 PM   Pulse 92 10/27/2020  1:49 PM   Resp 11 10/27/2020  1:49 PM   SpO2 96 % 10/27/2020  1:49 PM   Vitals shown include unvalidated device data.

## 2020-10-27 NOTE — INTERVAL H&P NOTE
Update History & Physical 
 
The Patient's History and Physical of 10/19/2020 Bilateral skin and nipple sparing mastectomies, right sln biopsy and reconstruction was reviewed with the patient and I examined the patient. There was no change. The surgical site was confirmed by the patient and me. Plan:  The risk, benefits, expected outcome, and alternative to the recommended procedure have been discussed with the patient. Patient understands and wants to proceed with the procedure.  
 
Electronically signed by Tawanna Monahan MD on 10/27/2020 at 8:06 AM

## 2020-10-27 NOTE — BRIEF OP NOTE
Brief Postoperative Note    Patient: Loretta Aviles  YOB: 1965  MRN: [id number]    Date of Procedure: 10/27/2020     Pre-Op Diagnosis: RIGHT BREAST dcis multicentric    Post-Op Diagnosis: Same as preoperative diagnosis. Procedure(s):  BILATERAL SKIN & NIPPLE SPARING MASTECTOMIES AND RIGHT BREAST SENTINEL NODE BIOPSY  SENTINEL NODE BIOPSY  BILATERAL BREAST RECONSTRUCTION WITH GEL IMPLANTS AND ALLODERM AND POSSIBLE PLACEMENT OF BILATERAL BREAST TISSUE EXPANDERS WITH ALLODERM  BREAST TISSUE EXPANDER INSERTION/EXCHANGE    Surgeon(s):   MD Ingris Pacheco MD    Surgical Assistant: None    Anesthesia: General     Estimated Blood Loss (mL): less than 902     Complications: None    Specimens:   ID Type Source Tests Collected by Time Destination   1 : BACK OF LEFT NIPPLE Frozen Section Breast  Destiney Kline MD 10/27/2020 5557 Pathology   2 : LEFT PROPHYLACTIC MASTECTOMY Fresh Breast  Destiney Kline MD 10/27/2020 4104 Pathology   3 : RIGHT AXILLARY SENTINEL NODE #1 Frozen Section Lymph Node  Detsiney Kline MD 10/27/2020 1011 Pathology   4 : RIGHT AXILLARY SENTINEL NODE #2 Frozen Section Lymph Node  Destiney Kline MD 10/27/2020 1012 Pathology   5 : BACK OF RIGHT NIPPLE Frozen Section Breast  Destiney Kline MD 10/27/2020 1012 Pathology   6 : RIGHT MASTECTOMY Fresh Breast  Destiney Kline MD 10/27/2020 1040 Pathology   7 : ANTERIOR MARGIN RIGHT MASTECTOMY Fresh Breast  Destiney Kline MD 10/27/2020 1041 Pathology        Implants: * No implants in log *    Drains: * No LDAs found *    Findings: 2 sln negative on frozen, back of nipples negative    Electronically Signed by Claudia Thomas MD on 10/27/2020 at 11:08 AM  Dictated stat

## 2020-10-27 NOTE — PERIOP NOTES
TRANSFER - OUT REPORT:    Verbal report given to CHIKI JORDAN RN  on Ricardo Mauro being transferred to [facility] (unit) for routine post - op       Report consisted of patient's Situation, Background, Assessment and   Recommendations(SBAR). Time Pre op antibiotic given: 2 g IV Ancef @ 9:06 AM, 2 g IV Ancef @ 13:02   Anesthesia Stop time: 7164  Fernandes Present on Transfer to floor:YES  Order for Fernandes on Chart:YES  Discharge Prescriptions with Chart:NO  Tylenol given: NO   Exparel given: YES @ 12:15    Information from the following report(s) SBAR, Kardex, OR Summary, Procedure Summary, Intake/Output, MAR, Alarm Parameters  and Procedure Verification was reviewed with the receiving nurse. Opportunity for questions and clarification was provided. Is the patient on 02? NO    Is the patient on a monitor? NO    Is the nurse transporting with the patient?  YES

## 2020-10-27 NOTE — OP NOTES
[facility] [place] [facility]  OPERATIVE REPORT    Name:  Mayela Lemus  MR#:  [id number]  :  1965  ACCOUNT #:  [de-identified]  DATE OF SERVICE:  10/27/2020    PREOPERATIVE DIAGNOSIS:  Right breast ductal carcinoma in situ, multicentric. POSTOPERATIVE DIAGNOSIS:  Right breast ductal carcinoma in situ, multicentric. PROCEDURE PERFORMED:  Bilateral skin and nipple-sparing mastectomy, right deep axillary sentinel lymph node biopsy, back of bilateral nipple biopsies. SURGEON:  Hanny Quach MD    ASSISTANT:  Kristen Ventura    ANESTHESIA:  General.    COMPLICATIONS:  None. SPECIMENS REMOVED:  1.  Back of left nipple. 2.  Left prophylactic mastectomy. 3.  Right axillary sentinel node #1.  4.  Right axillary sentinel node #2.  5.  Back of right nipple. 6.  Right mastectomy. 7.  Anterior margin. IMPLANTS:  None. ESTIMATED BLOOD LOSS:  100 mL. DRAINS:  None. FINDINGS:  Two sentinel lymph nodes negative on frozen, back of nipple negative. INDICATIONS FOR PROCEDURE:  This is a 57-year-old female who had multicentric right breast DCIS. She needs a mastectomy and reconstruction as well as sentinel lymph node biopsy, and she opted for prophylactic mastectomy on the left breast.    PROCEDURE IN DETAIL:  The patient initially went to Nuclear Medicine where technetium-99 was injected to the right. She tolerated this well. She went to the preoperative holding area where surgical site was marked by surgeon and informed consent was obtained. She was taken to the operating room, laid in supine position where general endotracheal anesthesia was induced. A Fernandes catheter was placed without complication. Bilateral breasts were prepped and draped in the usual fashion. A time-out was performed. Attention was turned to the left breast where a transverse lateral incision was made with a 10-blade. Bovie cautery was used to dissect behind the left nipple.   The back of left nipple was excised with 15-blade and sent for frozen section and found to be negative. Bovie cautery was used to dissect superior medial, inferior and lateral skin flaps. The breast was removed from the chest wall in a medial-to-lateral fashion incorporating the pectoral fascia. This was excised with Bovie cautery. The implant was submuscular and left intact for this portion. The capsular implant was left intact. The tissue was removed and marked short superior, long stitch lateral and sent for permanent pathology. Next, attention was turned to the right axilla where an inferior axillary hairline incision was made with 10-blade. Bovie cautery was used to dissect through the axillary fascia. Two sentinel nodes were identified using Neoprobe guidance and excised with LigaSure device. The count on the first node was 1230 and the second node was 132. These were excised with LigaSure and sent for frozen section and found to be negative. The background count was less than 10% of the hottest node. Next, 10-blade was used to make a transverse lateral incision. Bovie cautery was used to dissect behind the right nipple. The back of right nipple was sharply excised and sent for frozen section and found to be negative. Next, superior, medial, inferior and lateral skin flaps were dissected free from the skin. The breast was removed in toto from the chest wall in a medial-to-lateral fashion incorporating the pectoral fascia. The capsule was left intact. A short stitch superior, long stitch lateral were placed on the breast tissue and sent for permanent pathology. An additional anterior margin was sent sharply using curved Chavis scissors and this was sent for permanent pathology. Hemostasis was obtained with Bovie cautery. The axillary incision had 10 mL of 0.25% Marcaine injected into the axillary tissue. The axillary fascia was closed with interrupted 3-0 Vicryl and the skin with 4-0 subcuticular Monocryl.   Moist laps were packed in the cavity in preparation for Dr. Saira Sheth' reconstruction part of the procedure. All sponge, needle and instrument counts were correct. The patient went to Recovery in stable condition. The patient was stable during this time.       MD JAUN [id number]  D:  10/27/2020 11:14  T:  10/27/2020 11:52  JOB #:  [id number]

## 2020-10-27 NOTE — PROGRESS NOTES
TRANSFER - IN REPORT:    Verbal report received from [facility]) on David Vogel  being received from [facility] (unit) for routine post - op      Report consisted of patients Situation, Background, Assessment and   Recommendations(SBAR). Information from the following report(s) SBAR, Intake/Output, MAR, Accordion and Recent Results was reviewed with the receiving nurse. Opportunity for questions and clarification was provided. Assessment completed upon patients arrival to unit and care assumed.

## 2020-10-27 NOTE — ANESTHESIA PREPROCEDURE EVALUATION
Relevant Problems   No relevant active problems       Anesthetic History   No history of anesthetic complications            Review of Systems / Medical History  Patient summary reviewed, nursing notes reviewed and pertinent labs reviewed    Pulmonary  Within defined limits                 Neuro/Psych   Within defined limits           Cardiovascular  Within defined limits                     GI/Hepatic/Renal  Within defined limits              Endo/Other    Diabetes    Cancer     Other Findings              Physical Exam    Airway  Mallampati: II  TM Distance: > 6 cm  Neck ROM: normal range of motion   Mouth opening: Normal     Cardiovascular  Regular rate and rhythm,  S1 and S2 normal,  no murmur, click, rub, or gallop             Dental  No notable dental hx       Pulmonary  Breath sounds clear to auscultation               Abdominal  GI exam deferred       Other Findings            Anesthetic Plan    ASA: 2  Anesthesia type: general          Induction: Intravenous  Anesthetic plan and risks discussed with: Patient

## 2020-10-27 NOTE — BRIEF OP NOTE
Brief Postoperative Note    Patient: Monse Nogueira  YOB: 1965  MRN: [id number]    Date of Procedure: 10/27/2020     Pre-Op Diagnosis: RIGHT BREAST CANCER, s/p bilateral skin sparing and nipple sparing mastectomy     Post-Op Diagnosis: same      Procedure(s):  BILATERAL SKIN & NIPPLE SPARING MASTECTOMIES  SENTINEL NODE BIOPSY  BILATERAL BREAST RECONSTRUCTION WITH GEL IMPLANTS    Surgeon(s): MD Payton Simms MD    Surgical Assistant: Kevin Amado    Anesthesia: General     Estimated Blood Loss (mL): minimal    Complications: none    Specimens:   ID Type Source Tests Collected by Time Destination   1 : BACK OF LEFT NIPPLE Frozen Section Breast  Lynn Kaur MD 10/27/2020 2622 Pathology   2 : LEFT PROPHYLACTIC MASTECTOMY Fresh Breast  Lynn Kaur MD 10/27/2020 6845 Pathology   3 : RIGHT AXILLARY SENTINEL NODE #1 Frozen Section Lymph Node  Lynn Kaur MD 10/27/2020 1011 Pathology   4 : RIGHT AXILLARY SENTINEL NODE #2 Frozen Section Lymph Node  Lynn Kaur MD 10/27/2020 1012 Pathology   5 : BACK OF RIGHT NIPPLE Frozen Section Breast  Lynn Kaur MD 10/27/2020 1012 Pathology   6 : RIGHT MASTECTOMY Fresh Breast  Lynn Kaur MD 10/27/2020 1040 Pathology   7 : ANTERIOR MARGIN RIGHT MASTECTOMY Fresh Breast  Lynn Kaur MD 10/27/2020 1041 Pathology        Implants:   Implant Name Type Inv.  Item Serial No.  Lot No. LRB No. Used Action   IMPLANT BRST 445CC P6CM [id number] ROBINA [employer] RND GEL EXTRA - [id number]  IMPLANT BRST 445CC P6CM [id number] [employer]INA [employer]OTH RND GEL EXTRA [id number] [place] [employer] NA Right 1 Implanted   IMPLANT BRST 445CC P6CM [id number] [employer]OTH RND GEL EXTRA - [id number]  IMPLANT BRST 445CC P6CM [id number] ROBINA SMOOTH RND GEL EXTRA [id number] [place] [employer]  Left 1 Implanted       Drains:   Niranjan-Abraham Drain 10/27/20 Right Breast (Active)   Site Assessment Clean, dry, & intact 10/27/20 1234   Dressing Status Clean, dry, & intact 10/27/20 1234   Status Patent;Draining 10/27/20 1234   Drainage Color Sanguinous 10/27/20 1234       Niranjan-Abraham Drain 10/27/20 Left Breast (Active)   Site Assessment Clean, dry, & intact 10/27/20 1234   Dressing Status Clean, dry, & intact 10/27/20 1234   Status Patent;Draining 10/27/20 1234   Drainage Color Sanguinous 10/27/20 1234       Findings: None    Electronically Signed by Marietta Marquis MD on 10/27/2020 at 1:35 PM

## 2020-10-27 NOTE — ROUTINE PROCESS
Patient: Jose Hudson MRN: [id number]  SSN: [id number] YOB: 1965  Age: 48 y.o. Sex: female Patient is status post Procedure(s): BILATERAL SKIN & NIPPLE SPARING MASTECTOMIES 
SENTINEL NODE BIOPSY BILATERAL BREAST RECONSTRUCTION WITH GEL IMPLANTS. Surgeon(s) and Role: 
Panel 1: 
   Karol Mark MD - Primary Panel 2: 
   * Elisabeth Hawkins MD - Primary Local/Dose/Irrigation:  SEE MAR Peripheral IV 10/27/20 Left Arm (Active) Niranjan-Abraham Drain 10/27/20 Right Breast (Active) Site Assessment Clean, dry, & intact 10/27/20 1234 Dressing Status Clean, dry, & intact 10/27/20 1234 Status Patent;Draining 10/27/20 1234 Drainage Color Sanguinous 10/27/20 1234 Niranjan-Abraham Drain 10/27/20 Left Breast (Active) Site Assessment Clean, dry, & intact 10/27/20 1234 Dressing Status Clean, dry, & intact 10/27/20 1234 Status Patent;Draining 10/27/20 1234 Drainage Color Sanguinous 10/27/20 1234 Dressing/Packing:  Wound Axilla Right-Dressing Type: Xeroform;4 x 4;ABD pad;Bra(BACITRACIN OINTMENT) (10/27/20 1253) Splint/Cast:  ] Other:

## 2020-10-28 VITALS
BODY MASS INDEX: 22.96 KG/M2 | OXYGEN SATURATION: 94 % | HEART RATE: 78 BPM | SYSTOLIC BLOOD PRESSURE: 118 MMHG | TEMPERATURE: 99.4 F | DIASTOLIC BLOOD PRESSURE: 78 MMHG | WEIGHT: 138 LBS | RESPIRATION RATE: 16 BRPM

## 2020-10-28 LAB
GLUCOSE BLD STRIP.AUTO-MCNC: 125 MG/DL (ref 65–100)
SERVICE CMNT-IMP: ABNORMAL

## 2020-10-28 PROCEDURE — 99218 HC RM OBSERVATION: CPT

## 2020-10-28 PROCEDURE — 74011250636 HC RX REV CODE- 250/636: Performed by: PLASTIC SURGERY

## 2020-10-28 PROCEDURE — 74011000258 HC RX REV CODE- 258: Performed by: PLASTIC SURGERY

## 2020-10-28 PROCEDURE — 74011250637 HC RX REV CODE- 250/637: Performed by: PLASTIC SURGERY

## 2020-10-28 PROCEDURE — 82962 GLUCOSE BLOOD TEST: CPT

## 2020-10-28 RX ORDER — ACETAMINOPHEN 325 MG/1
650 TABLET ORAL
Status: DISCONTINUED | OUTPATIENT
Start: 2020-10-28 | End: 2020-10-28 | Stop reason: HOSPADM

## 2020-10-28 RX ORDER — CEPHALEXIN 250 MG/1
500 CAPSULE ORAL 3 TIMES DAILY
Qty: 42 CAP | Refills: 0 | Status: SHIPPED | OUTPATIENT
Start: 2020-10-28 | End: 2020-11-04

## 2020-10-28 RX ORDER — HYDROMORPHONE HYDROCHLORIDE 2 MG/1
2-4 TABLET ORAL
Qty: 30 TAB | Refills: 0 | Status: SHIPPED | OUTPATIENT
Start: 2020-10-28 | End: 2020-10-31

## 2020-10-28 RX ADMIN — CEFAZOLIN SODIUM 1 G: 1 INJECTION, POWDER, FOR SOLUTION INTRAMUSCULAR; INTRAVENOUS at 04:38

## 2020-10-28 RX ADMIN — HYDROMORPHONE HYDROCHLORIDE 2 MG: 2 TABLET ORAL at 01:02

## 2020-10-28 RX ADMIN — HYDROMORPHONE HYDROCHLORIDE 2 MG: 2 TABLET ORAL at 09:48

## 2020-10-28 RX ADMIN — ACETAMINOPHEN 650 MG: 325 TABLET ORAL at 08:14

## 2020-10-28 NOTE — PROGRESS NOTES
Bedside and Verbal shift change report given to Madi Shepherd (oncoming nurse) by Cesar Uribe (offgoing nurse). Report included the following information SBAR, Kardex, Intake/Output, MAR, Accordion and Recent Results.

## 2020-10-28 NOTE — PROGRESS NOTES
Doing well this am  Tolerating PO diet    VSS   AF  Pink and viable flaps  MARISEL serosanguinous  No infection or hematoma    POD 1 s/p breast reconstruction with implants  Discharge planning  MARISEL instruction

## 2020-10-30 NOTE — OP NOTES
[facility]  OPERATIVE REPORT    Name:  Teto Roger  MR#:  [id number]  :  1965  ACCOUNT #:  [de-identified]  DATE OF SERVICE:  10/27/2020      PREOPERATIVE DIAGNOSES:  1. Right breast cancer. 2.  Status post skin-sparing and nipple-sparing mastectomy. POSTOPERATIVE DIAGNOSES:  1. Right breast cancer. 2.  Status post skin-sparing and nipple-sparing mastectomy. PROCEDURES PERFORMED:  1.  Bilateral immediate breast reconstruction using breast implants. 2.  Bilateral breast capsulotomy. 3.  Bilateral removal of intact mammary implant. SURGEON:  MD Enmanuel Linn. ANESTHESIA:  General.    ESTIMATED BLOOD LOSS:  Minimal.    IMPLANTS:  On the left, it is a [employer] Inspira breast implant, style SRX, holding 445 mL of gel, reference number SRX-445, SN number is [id number]. On the right, it is the same implant, reference number SRX-445, SN number [id number]. INDICATIONS FOR SURGERY:  The patient is a 61-year-old woman who has a right-sided breast cancer. She is here for skin-sparing and nipple-sparing mastectomy with Dr. Keyanna Martinez. She has opted for breast reconstruction in the immediate setting. She has a history of bilateral breast augmentation by Dr. Nyla Lee through a transaxillary approach. She has opted to have silicone breast implants instead of her previous saline implants. PROCEDURE:  After the patient signed informed consent, she was marked in the preoperative holding area. She was then taken to the operating room, placed on the operating room table in supine position. She was placed under general endotracheal anesthesia without complication. A time-out was performed in order to verify the patient's identity and surgical sites, which were both correct. She was given preoperative antibiotics which consisted of IV Ancef. She underwent the procedure with Dr. Watson Halsted.   For details of her surgery, please see her dictation from this date.  After conclusion of the mastectomy portion of the case, the patient was reprepped and redraped in a sterile surgical fashion using Betadine paint. I began on the left side where I evaluated the breast capsule and the breast implant. This was removed in its entirety without complication. The temporary sizer was utilized in order to assess the size and volume of the breast pocket that remained and it was apparent that a 445 mL sizer would be the best choice. Therefore, an incision was created into the breast capsule. The previous implant was removed. The temporary sizer was placed and the patient was placed in the upright and sitting position to evaluate for size and symmetry. It was apparent that there was some redundant skin present on the left side. This was tailor tacked to close in a much more appropriate fashion. The patient was then placed into the supine position. The temporary staples were removed. The excess skin was excised using a #10 blade. This was done in similar fashion bilaterally. After this point, the operative site was irrigated copiously using gentamicin, Ancef, and vancomycin in the irrigation. Excellent hemostasis was achieved using electrocautery. Exparel was diluted into 0.5% Marcaine plain and approximately 20 mL of this mixture was injected into each breast for postoperative pain management. A #15 round Niranjan-Abraham drain was placed at the base of the operated breast exiting the lateral chest wall. This was secured using an interrupted 3-0 PDS suture. At this point, new gloves were donned. The implants were then prepared on the back table and they were placed into the breast pocket using a Harkins funnel for a no-touch technique. The capsule was then closed over this implant and this was done using an interrupted 3-0 PDS suture in a horizontal mattress type fashion.   The deep dermis was then closed using a buried interrupted 3-0 Monocryl and the skin was closed using running subcuticular 4-0 Monocryl. The patient was then dressed using bacitracin ointment, Xeroform, 4x4s, ABD, and a surgical bra. The patient was extubated and transferred to the PACU in stable condition. There were no complications during the procedure. The patient tolerated the procedure without complication. The instrument, sponge, and needle count was correct at the conclusion of the case. The weight for the left side was 143 g and the weight for the right breast mastectomy was 128 g.       Kevon Doherty MD      [id number]  D:  10/29/2020 14:16  T:  10/29/2020 21:23  JOB #:  [id number]

## 2020-11-02 NOTE — PROGRESS NOTES
Called patient with surg path  2 foci microinvasion  With additional anterior margin, clear margins   T1 N0 opposite breast ok  Sees dr. Mei Parsons Wednesday, me next week  Will refer to med onc

## 2020-11-09 ENCOUNTER — OFFICE VISIT (OUTPATIENT)
Dept: SURGERY | Age: 55
End: 2020-11-09
Payer: COMMERCIAL

## 2020-11-09 VITALS
DIASTOLIC BLOOD PRESSURE: 76 MMHG | HEART RATE: 63 BPM | BODY MASS INDEX: 22.99 KG/M2 | HEIGHT: 65 IN | WEIGHT: 138 LBS | TEMPERATURE: 97.8 F | SYSTOLIC BLOOD PRESSURE: 124 MMHG

## 2020-11-09 DIAGNOSIS — C50.911 DUCTAL CARCINOMA IN SITU (DCIS) OF RIGHT BREAST WITH MICROINVASIVE COMPONENT (HCC): Primary | ICD-10-CM

## 2020-11-09 PROCEDURE — 99024 POSTOP FOLLOW-UP VISIT: CPT | Performed by: SURGERY

## 2020-11-09 NOTE — PROGRESS NOTES
HISTORY OF PRESENT ILLNESS  Kimberly Langford is a 48 y.o. female. HPI  ESTABLISHED patient here for post op follow up, s/p BILATERAL mastectomies. She is doing well. Has some tenderness but states she does not have pain. MARISEL drains removed by plastic surgeon on Wednesday. 10/27/20 - BILATERAL skin and nipple sparing mastectomies with reconstruction, implants (Dr. Shannen Olsen)    RIGHT breast DCIS ER 10%, TX 0%  Genetic testing negative  Review of Systems   All other systems reviewed and are negative. Physical Exam  Vitals signs and nursing note reviewed. Chest:          Comments: Bilateral implants intact  Skin viable  Nipples viable        ASSESSMENT and PLAN    ICD-10-CM ICD-9-CM    1. Ductal carcinoma in situ (DCIS) of right breast with microinvasive component  D05.11 233.0      47 yo female with mostly dcis right breast, with 2 foci microinvasion, node negative  Stage 1  With additional anterior margin the margins negative.   No xrt  Will refer to med onc

## 2020-11-09 NOTE — LETTER
11/10/20 Patient: Hola Cabezas YOB: 1965 Date of Visit: 11/9/2020 Michelle Sahu MD 
[address] [place] [address] [address] [address] VIA Facsimile: [phone] Dear Michelle Sahu MD, Thank you for referring Ms. Luisana Perez to [address] [place] [address] for evaluation. My notes for this consultation are attached. If you have questions, please do not hesitate to call me. I look forward to following your patient along with you. Sincerely, Megha Helm MD

## 2020-11-10 PROBLEM — C50.911 DUCTAL CARCINOMA IN SITU (DCIS) OF RIGHT BREAST WITH MICROINVASIVE COMPONENT (HCC): Status: ACTIVE | Noted: 2020-11-10

## 2020-11-10 RX ORDER — INSULIN GLARGINE 100 [IU]/ML
INJECTION, SOLUTION SUBCUTANEOUS
Qty: 15 ML | Refills: 3 | Status: SHIPPED | OUTPATIENT
Start: 2020-11-10 | End: 2020-12-14 | Stop reason: SDUPTHER

## 2020-11-12 DIAGNOSIS — D05.11 DUCTAL CARCINOMA IN SITU (DCIS) OF RIGHT BREAST: Primary | ICD-10-CM

## 2020-11-19 ENCOUNTER — DOCUMENTATION ONLY (OUTPATIENT)
Dept: ONCOLOGY | Age: 55
End: 2020-11-19

## 2020-11-19 ENCOUNTER — OFFICE VISIT (OUTPATIENT)
Dept: ONCOLOGY | Age: 55
End: 2020-11-19
Payer: COMMERCIAL

## 2020-11-19 VITALS
BODY MASS INDEX: 22.92 KG/M2 | WEIGHT: 137.6 LBS | DIASTOLIC BLOOD PRESSURE: 74 MMHG | HEART RATE: 77 BPM | SYSTOLIC BLOOD PRESSURE: 107 MMHG | HEIGHT: 65 IN | TEMPERATURE: 96.6 F | OXYGEN SATURATION: 96 %

## 2020-11-19 DIAGNOSIS — Z90.13 S/P MASTECTOMY, BILATERAL: ICD-10-CM

## 2020-11-19 DIAGNOSIS — C50.911 MALIGNANT NEOPLASM OF RIGHT BREAST IN FEMALE, ESTROGEN RECEPTOR POSITIVE, UNSPECIFIED SITE OF BREAST (HCC): Primary | ICD-10-CM

## 2020-11-19 DIAGNOSIS — C50.911 DUCTAL CARCINOMA IN SITU (DCIS) OF RIGHT BREAST WITH MICROINVASIVE COMPONENT (HCC): ICD-10-CM

## 2020-11-19 DIAGNOSIS — Z17.0 MALIGNANT NEOPLASM OF RIGHT BREAST IN FEMALE, ESTROGEN RECEPTOR POSITIVE, UNSPECIFIED SITE OF BREAST (HCC): Primary | ICD-10-CM

## 2020-11-19 DIAGNOSIS — E10.9 TYPE 1 DIABETES MELLITUS WITHOUT COMPLICATION (HCC): ICD-10-CM

## 2020-11-19 PROCEDURE — 99204 OFFICE O/P NEW MOD 45 MIN: CPT | Performed by: INTERNAL MEDICINE

## 2020-11-19 NOTE — PROGRESS NOTES
Mr. Kamara is 69 y.o. male    CHIEF COMPLAINT: I am here to discuss my surgery and get my cath removed.     HPI  He is postop prostatectomy.  His GETACHEW drain was removed earlier in the week.  He has no blood in urine.  He had a cystogram today.    The following portions of the patient's history were reviewed and updated as appropriate: allergies, current medications, past family history, past medical history, past social history, past surgical history and problem list.    Review of Systems   Constitutional: Negative for chills and fever.   Gastrointestinal: Negative for abdominal pain, anal bleeding and blood in stool.   Genitourinary: Negative for flank pain and hematuria.         Current Outpatient Prescriptions:   •  atorvastatin (LIPITOR) 10 MG tablet, Take 20 mg by mouth Daily., Disp: , Rfl:   •  HYDROcodone-acetaminophen (NORCO) 5-325 MG per tablet, Take 1 tablet by mouth Every 4 (Four) Hours As Needed for Moderate Pain  for up to 24 doses., Disp: 24 tablet, Rfl: 0  •  levothyroxine (SYNTHROID, LEVOTHROID) 200 MCG tablet, Take 200 mcg by mouth Daily., Disp: , Rfl:   •  lisinopril (PRINIVIL,ZESTRIL) 40 MG tablet, Take 40 mg by mouth Daily., Disp: , Rfl:   •  nabumetone (RELAFEN) 500 MG tablet, Take 500 mg by mouth 2 (Two) Times a Day As Needed for Mild Pain (1-3) (arhritis and knee pain)., Disp: , Rfl:   •  SITagliptin (JANUVIA) 100 MG tablet, Take 100 mg by mouth Daily., Disp: , Rfl:   •  tamsulosin (FLOMAX) 0.4 MG capsule 24 hr capsule, Take 1 capsule by mouth every night., Disp: , Rfl:   No current facility-administered medications for this visit.     Past Medical History:   Diagnosis Date   • Arthritis    • Cancer     thyroid   • Diabetes    • Disease of thyroid gland    • Hypercholesteremia    • Hypertension    • IBS (irritable bowel syndrome)    • Prostate cancer    • Sleep apnea     NON COMPLIANT WITH C PAP       Past Surgical History:   Procedure Laterality Date   • CHOLECYSTECTOMY     • COLONOSCOPY N/A  [facility] [place] [facility] [place] [address] [address], [facility] [address]  W: [phone]  F: [phone]    Reason for Visit:   Diana Cortes is a 48 y.o. female who is seen in consultation at the request of Dr. 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 2 negative nodes    History of Present Illness:   Patient is a 48 y.o. female who was found to have an abnormal mammogram in 10/2020 which led to an MRI, biopsy and now she is s/p R total mastectomy on 10/27/2020. Now comes to discuss additional management    She is recovering well  No pain, fevers, chills, sweats, HA, stays active, no HA, falls, weight changes, other lumps      She is on Insulin for DMI- AIc 6.2  She is a   Lost her  to Melanoma   Comes with partner Regina Ortiz    Past Medical History:   Diagnosis Date    Cancer ([facility] [address] 09/2020    RIGHT BREAST CA    Diabetes ([facility] [address] 07/2017    Type 1 diabetes    Menopause       Past Surgical History:   Procedure Laterality Date    BREAST SURGERY PROCEDURE UNLISTED  2008    BREAST AUGMENTATION    HX APPENDECTOMY      HX BREAST RECONSTRUCTION Bilateral 10/27/2020    BILATERAL BREAST RECONSTRUCTION WITH GEL IMPLANTS performed by Jerry Ambrose MD at [facility]  [id number]    HX COLONOSCOPY      HX MASTECTOMY Bilateral 10/27/2020    BILATERAL SKIN & NIPPLE SPARING MASTECTOMIES performed by Rande Severin, MD at [address] [place] [address] Rd [address]    IMPLANT BREAST SILICONE/EQ Bilateral     2008      Social History     Tobacco Use    Smoking status: Never Smoker    Smokeless tobacco: Never Used   Substance Use Topics    Alcohol use:  Yes     Alcohol/week: 1.0 standard "3/7/2017    Procedure: COLONOSCOPY WITH ANESTHESIA;  Surgeon: Hector Alvarenga MD;  Location: [facility] ENDOSCOPY;  Service:    • CYSTOSCOPY TRANSURETHRAL RESECTION OF PROSTATE     • PROSTATE SURGERY     • PROSTATECTOMY N/A 8/1/2017    Procedure: PROSTATECTOMY LAPAROSCOPIC WITH DAVINCI SI ROBOT ;  Surgeon: Hernesto Hong MD;  Location: [facility] OR;  Service:    • THYROID SURGERY      RADIATION THERAPY AFTER SURGERY       Social History     Social History   • Marital status:      Spouse name: N/A   • Number of children: N/A   • Years of education: N/A     Social History Main Topics   • Smoking status: Never Smoker   • Smokeless tobacco: Never Used   • Alcohol use No   • Drug use: No   • Sexual activity: Defer     Other Topics Concern   • Not on file     Social History Narrative       Family History   Problem Relation Age of Onset   • Family history unknown: Yes   • Prostate cancer Father        Ht 71\" (180.3 cm)  Wt 214 lb (97.1 kg)  BMI 29.85 kg/m2    Physical Exam  Incisions are healing nicely.  No evidence of hernia.    Results for orders placed or performed during the hospital encounter of 08/01/17   Basic Metabolic Panel   Result Value Ref Range    Glucose 215 (H) 70 - 100 mg/dL    BUN 29 (H) 5 - 21 mg/dL    Creatinine 2.42 (H) 0.50 - 1.40 mg/dL    Sodium 136 135 - 145 mmol/L    Potassium 5.5 (H) 3.5 - 5.3 mmol/L    Chloride 101 98 - 110 mmol/L    CO2 24.0 24.0 - 31.0 mmol/L    Calcium 7.6 (L) 8.4 - 10.4 mg/dL    eGFR Non African Amer 27 (L) >60 mL/min/1.73    BUN/Creatinine Ratio 12.0 7.0 - 25.0    Anion Gap 11.0 4.0 - 13.0 mmol/L   Creatinine, Body Fluid   Result Value Ref Range    Creatinine, Fluid 1.7 mg/dL   CBC Auto Differential   Result Value Ref Range    WBC 16.84 (H) 4.80 - 10.80 10*3/mm3    RBC 4.37 (L) 4.80 - 5.90 10*6/mm3    Hemoglobin 12.7 (L) 14.0 - 18.0 g/dL    Hematocrit 37.7 (L) 40.0 - 52.0 %    MCV 86.3 82.0 - 95.0 fL    MCH 29.1 28.0 - 32.0 pg    MCHC 33.7 33.0 - 36.0 g/dL    RDW 14.3 " drinks     Types: 1 Glasses of wine per week     Comment: social      Family History   Problem Relation Age of Onset    Hypertension Mother     Stroke Mother     Heart Disease Mother         COPD    Lung Disease Mother         COPD    Cancer Father         PROSTATE,MELANOMA    No Known Problems Brother     Cancer Maternal Aunt         Lung, Yimi and Colon    Breast Cancer Maternal Aunt     Cancer Maternal Grandmother         Pancreatic    Thyroid Disease Brother     Breast Cancer Maternal Aunt     Diabetes Neg Hx     Anesth Problems Neg Hx      Current Outpatient Medications   Medication Sig    insulin glargine (Basaglar KwikPen U-100 Insulin) 100 unit/mL (3 mL) inpn 15 units every day. Dispense 5 pens.  fexofenadine-pseudoephedrine (Allegra-D 12 Hour)  mg Tb12 Take 1 Tab by mouth every twelve (12) hours.  lancets (FlowCo Ultra Fine Lancets) 33 gauge misc Check blood sugar 4 times per day    glucose blood VI test strips (Accu-Chek Indio Plus test strp) strip CHECK SUGARS 4 TIMES PER DAY    NovoLOG Flexpen U-100 Insulin 100 unit/mL (3 mL) inpn Inject 2-10 units up to 3 times daily with higher carb meal.    Gauri Pen Needle 32 gauge x 5/32\" ndle 4 shots daily    IBUPROFEN PO Take 400 mg by mouth as needed.  Blood-Glucose Meter (ACCU-CHEK INDIO PLUS METER) misc Check sugars 4 times per day    acyclovir (ZOVIRAX) 200 mg capsule as needed.  multivitamin (ONE A DAY) tablet Take 1 Tab by mouth daily. No current facility-administered medications for this visit. Allergies   Allergen Reactions    Tree Nut Anaphylaxis     Not true allergy. Peanut sensitivity via allergy testing        Review of Systems: A complete review of systems was obtained, negative except as described above.     Physical Exam:     Visit Vitals  /74   Pulse 77   Temp (!) 96.6 °F (35.9 °C)   Ht 5' 5\" (1.651 m)   Wt 137 lb 9.6 oz (62.4 kg)   LMP 10/02/2017 (Within Weeks)   SpO2 96%   BMI 22.90 kg/m²     ECOG 12.0 - 15.0 %    RDW-SD 44.7 40.0 - 54.0 fl    MPV 10.5 6.0 - 12.0 fL    Platelets 277 130 - 400 10*3/mm3    Neutrophil % 93.1 (H) 39.0 - 78.0 %    Lymphocyte % 1.2 (L) 15.0 - 45.0 %    Monocyte % 4.9 4.0 - 12.0 %    Eosinophil % 0.1 0.0 - 4.0 %    Basophil % 0.2 0.0 - 2.0 %    Immature Grans % 0.5 0.0 - 5.0 %    Neutrophils, Absolute 15.69 (H) 1.87 - 8.40 10*3/mm3    Lymphocytes, Absolute 0.20 (L) 0.72 - 4.86 10*3/mm3    Monocytes, Absolute 0.83 0.19 - 1.30 10*3/mm3    Eosinophils, Absolute 0.01 0.00 - 0.70 10*3/mm3    Basophils, Absolute 0.03 0.00 - 0.20 10*3/mm3    Immature Grans, Absolute 0.08 (H) 0.00 - 0.03 10*3/mm3   Basic Metabolic Panel   Result Value Ref Range    Glucose 142 (H) 70 - 100 mg/dL    BUN 37 (H) 5 - 21 mg/dL    Creatinine 2.97 (H) 0.50 - 1.40 mg/dL    Sodium 135 135 - 145 mmol/L    Potassium 5.0 3.5 - 5.3 mmol/L    Chloride 102 98 - 110 mmol/L    CO2 24.0 24.0 - 31.0 mmol/L    Calcium 7.2 (L) 8.4 - 10.4 mg/dL    eGFR Non African Amer 21 (L) >60 mL/min/1.73    BUN/Creatinine Ratio 12.5 7.0 - 25.0    Anion Gap 9.0 4.0 - 13.0 mmol/L   CBC (No Diff)   Result Value Ref Range    WBC 14.61 (H) 4.80 - 10.80 10*3/mm3    RBC 3.79 (L) 4.80 - 5.90 10*6/mm3    Hemoglobin 11.0 (L) 14.0 - 18.0 g/dL    Hematocrit 32.5 (L) 40.0 - 52.0 %    MCV 85.8 82.0 - 95.0 fL    MCH 29.0 28.0 - 32.0 pg    MCHC 33.8 33.0 - 36.0 g/dL    RDW 14.5 12.0 - 15.0 %    RDW-SD 45.1 40.0 - 54.0 fl    MPV 10.2 6.0 - 12.0 fL    Platelets 232 130 - 400 10*3/mm3   Vitamin D 1,25 Dihydroxy   Result Value Ref Range    1,25-Dihydroxy, Vitamin D 18.7 (L) 19.9 - 79.3 pg/mL   Creatinine, Urine, Random   Result Value Ref Range    Creatinine, Urine 139.9 mg/dL   Osmolality, Urine   Result Value Ref Range    Osmolality, Urine 411 (L) 601 - 850 mOsm/kg   Sodium, Urine, Random   Result Value Ref Range    Sodium, Urine 36 30 - 90 mmol/L   Urea Nitrogen, Urine   Result Value Ref Range    Urea Nitrogen, Urine 705 mg/dL   CBC (No Diff)   Result Value  PS: 0  General: No distress  Eyes: PERRLA,  HENT: Atraumatic  Neck: Supple  Respiratory: normal respiratory effort  CV:  no peripheral edema  GI: nondistended  MS: Normal gait and station. Digits without clubbing or cyanosis. Skin: No rashes, ecchymoses, or petechiae. Normal temperature, turgor, and texture. Psych: Alert, oriented, appropriate affect, normal judgment/insight    Results:     Lab Results   Component Value Date/Time    WBC 5.0 10/19/2020 11:16 AM    HGB 12.2 10/19/2020 11:16 AM    HCT 35.9 10/19/2020 11:16 AM    PLATELET 546 77/50/5369 11:16 AM    .3 (H) 10/19/2020 11:16 AM    ABS. NEUTROPHILS 3.2 10/19/2020 11:16 AM     Lab Results   Component Value Date/Time    Sodium 138 10/19/2020 11:16 AM    Potassium 4.3 10/19/2020 11:16 AM    Chloride 103 10/19/2020 11:16 AM    CO2 29 10/19/2020 11:16 AM    Glucose 124 (H) 10/19/2020 11:16 AM    BUN 6 10/19/2020 11:16 AM    Creatinine 0.76 10/19/2020 11:16 AM    GFR est AA >60 10/19/2020 11:16 AM    GFR est non-AA >60 10/19/2020 11:16 AM    Calcium 9.1 10/19/2020 11:16 AM    Glucose (POC) 125 (H) 10/28/2020 06:31 AM     Lab Results   Component Value Date/Time    Bilirubin, total 0.4 08/11/2020 08:33 AM    ALT (SGPT) 10 08/11/2020 08:33 AM    Alk. phosphatase 78 08/11/2020 08:33 AM    Protein, total 6.5 08/11/2020 08:33 AM    Albumin 4.4 08/11/2020 08:33 AM    Globulin 3.1 07/24/2017 12:42 AM       Pathology    INVASIVE CARCINOMA OF THE BREAST: Resection   SPECIMEN   Procedure:  Total mastectomy   Specimen Laterality: Right   TUMOR   Histologic Type: Micro-invasive carcinoma   Glandular (Acinar) / Tubular Differentiation: Only microinvasion   present (not graded)   Tumor Size: Microinvasion only (<= 1 mm)   Ductal Carcinoma In Situ (DCIS): Present   Ductal Carcinoma In Situ (DCIS): Positive for extensive   intraductal component (EIC)   Size (Extent) of DCIS: 20 mm (estimate)   Architectural Patterns: Micropapillary   Nuclear Grade: Grade III (high) Necrosis: Present, central (expansive \"comedo\" necrosis)   Lobular Carcinoma In Situ (LCIS): Not identified   Treatment Effect in the Breast: No known presurgical therapy   MARGINS   Invasive Carcinoma Margins: Uninvolved by invasive carcinoma   Distance from Closest Margin (Millimeters): 5 mm (anterior)   DCIS Margins: Positive for DCIS   Positive Margin(s): Anterior   Distance from Other Margins   Posterior Margin (Millimeters): 2 mm   LYMPH NODES   Regional Lymph Nodes: Uninvolved by tumor cells   Total Number of Lymph Nodes Examined: 2   Number of [place] Nodes Examined: 2   PATHOLOGIC STAGE CLASSIFICATION (pTNM, AJCC 8th Edition)   Primary Tumor (pT): pT1mi   Regional Lymph Nodes Modifier: (sn): [place] node(s) evaluated   Regional Lymph Nodes (pN): pN0   SPECIAL STUDIES   Breast Biomarker Testing Performed on Previous Biopsy ([id number]):   Estrogen Receptor (ER) Status: Negative. See comment   Progesterone Receptor (PgR) Status: Negative. See comment   7. Breast, right, anterior margin, reexcision:   No tumor identified      Interpretation   HORMONE RECEPTOR IMMUNOHISTOCHEMISTRY   RESULTS:   Ductal Carcinoma In Situ   ER ME   Interpretation: Positive Interpretation: Negative   Nuclear Staining %: 10 Nuclear Staining %: 0   Intensity: 3 Intensity 0     Records reviewed and summarized above. Pathology report(s) reviewed above. Radiology report(s) reviewed above.       Assessment:   1) R breast IDC and extensive DCIS    S/P Bilateral skin sparing Mastectomy and SLN 10/27/2020  Extensive high grade DCIS, negative margins, microinvasive carcinoma, 2 negative nodes  ER10% ME negative and HER2 note done ( DCIS)- receptors not done on microinvasive component    pT1miN0MX-Stage IA    Limited data suggest there is no prognostic significance of hormone and/or human epidermal growth factor 2 (HER2) receptor expression  Microinvasive breast carcinoma has an excellent prognosis, with a five-year overall survival > 97% Ref Range    WBC 11.34 (H) 4.80 - 10.80 10*3/mm3    RBC 3.14 (L) 4.80 - 5.90 10*6/mm3    Hemoglobin 9.2 (L) 14.0 - 18.0 g/dL    Hematocrit 27.2 (L) 40.0 - 52.0 %    MCV 86.6 82.0 - 95.0 fL    MCH 29.3 28.0 - 32.0 pg    MCHC 33.8 33.0 - 36.0 g/dL    RDW 14.7 12.0 - 15.0 %    RDW-SD 46.4 40.0 - 54.0 fl    MPV 9.8 6.0 - 12.0 fL    Platelets 187 130 - 400 10*3/mm3   Comprehensive Metabolic Panel   Result Value Ref Range    Glucose 118 (H) 70 - 100 mg/dL    BUN 33 (H) 5 - 21 mg/dL    Creatinine 1.90 (H) 0.50 - 1.40 mg/dL    Sodium 136 135 - 145 mmol/L    Potassium 4.5 3.5 - 5.3 mmol/L    Chloride 105 98 - 110 mmol/L    CO2 24.0 24.0 - 31.0 mmol/L    Calcium 6.8 (L) 8.4 - 10.4 mg/dL    Total Protein 5.1 (L) 6.3 - 8.7 g/dL    Albumin 2.80 (L) 3.50 - 5.00 g/dL    ALT (SGPT) 28 0 - 54 U/L    AST (SGOT) 24 7 - 45 U/L    Alkaline Phosphatase 29 24 - 120 U/L    Total Bilirubin 1.3 (H) 0.1 - 1.0 mg/dL    eGFR Non African Amer 36 (L) >60 mL/min/1.73    Globulin 2.3 gm/dL    A/G Ratio 1.2 1.1 - 2.5 g/dL    BUN/Creatinine Ratio 17.4 7.0 - 25.0    Anion Gap 7.0 4.0 - 13.0 mmol/L   Magnesium   Result Value Ref Range    Magnesium 2.4 (H) 1.4 - 2.2 mg/dL   Phosphorus   Result Value Ref Range    Phosphorus 2.9 2.5 - 4.5 mg/dL   Vitamin D 25 Hydroxy   Result Value Ref Range    25 Hydroxy, Vitamin D 20.9 (L) 30.0 - 100.0 ng/ml   Comprehensive Metabolic Panel   Result Value Ref Range    Glucose 113 (H) 70 - 100 mg/dL    BUN 22 (H) 5 - 21 mg/dL    Creatinine 1.14 0.50 - 1.40 mg/dL    Sodium 135 135 - 145 mmol/L    Potassium 4.7 3.5 - 5.3 mmol/L    Chloride 103 98 - 110 mmol/L    CO2 27.0 24.0 - 31.0 mmol/L    Calcium 7.7 (L) 8.4 - 10.4 mg/dL    Total Protein 5.6 (L) 6.3 - 8.7 g/dL    Albumin 3.20 (L) 3.50 - 5.00 g/dL    ALT (SGPT) 27 0 - 54 U/L    AST (SGOT) 24 7 - 45 U/L    Alkaline Phosphatase 32 24 - 120 U/L    Total Bilirubin 1.2 (H) 0.1 - 1.0 mg/dL    eGFR Non African Amer 64 >60 mL/min/1.73    Globulin 2.4 gm/dL    A/G Ratio 1.3 1.1 -  2.5 g/dL    BUN/Creatinine Ratio 19.3 7.0 - 25.0    Anion Gap 5.0 4.0 - 13.0 mmol/L   CBC Auto Differential   Result Value Ref Range    WBC 11.04 (H) 4.80 - 10.80 10*3/mm3    RBC 3.08 (L) 4.80 - 5.90 10*6/mm3    Hemoglobin 9.0 (L) 14.0 - 18.0 g/dL    Hematocrit 26.9 (L) 40.0 - 52.0 %    MCV 87.3 82.0 - 95.0 fL    MCH 29.2 28.0 - 32.0 pg    MCHC 33.5 33.0 - 36.0 g/dL    RDW 14.6 12.0 - 15.0 %    RDW-SD 46.7 40.0 - 54.0 fl    MPV 10.0 6.0 - 12.0 fL    Platelets 208 130 - 400 10*3/mm3    Neutrophil % 86.5 (H) 39.0 - 78.0 %    Lymphocyte % 5.3 (L) 15.0 - 45.0 %    Monocyte % 5.3 4.0 - 12.0 %    Eosinophil % 2.3 0.0 - 4.0 %    Basophil % 0.1 0.0 - 2.0 %    Immature Grans % 0.5 0.0 - 5.0 %    Neutrophils, Absolute 9.56 (H) 1.87 - 8.40 10*3/mm3    Lymphocytes, Absolute 0.58 (L) 0.72 - 4.86 10*3/mm3    Monocytes, Absolute 0.59 0.19 - 1.30 10*3/mm3    Eosinophils, Absolute 0.25 0.00 - 0.70 10*3/mm3    Basophils, Absolute 0.01 0.00 - 0.20 10*3/mm3    Immature Grans, Absolute 0.05 (H) 0.00 - 0.03 10*3/mm3   Creatinine, Body Fluid   Result Value Ref Range    Creatinine, Fluid 3.7 mg/dL   Basic Metabolic Panel   Result Value Ref Range    Glucose 127 (H) 70 - 100 mg/dL    BUN 17 5 - 21 mg/dL    Creatinine 0.99 0.50 - 1.40 mg/dL    Sodium 136 135 - 145 mmol/L    Potassium 4.5 3.5 - 5.3 mmol/L    Chloride 99 98 - 110 mmol/L    CO2 28.0 24.0 - 31.0 mmol/L    Calcium 8.1 (L) 8.4 - 10.4 mg/dL    eGFR Non African Amer 75 >60 mL/min/1.73    BUN/Creatinine Ratio 17.2 7.0 - 25.0    Anion Gap 9.0 4.0 - 13.0 mmol/L   CBC Auto Differential   Result Value Ref Range    WBC 12.18 (H) 4.80 - 10.80 10*3/mm3    RBC 3.36 (L) 4.80 - 5.90 10*6/mm3    Hemoglobin 9.8 (L) 14.0 - 18.0 g/dL    Hematocrit 29.2 (L) 40.0 - 52.0 %    MCV 86.9 82.0 - 95.0 fL    MCH 29.2 28.0 - 32.0 pg    MCHC 33.6 33.0 - 36.0 g/dL    RDW 14.1 12.0 - 15.0 %    RDW-SD 44.3 40.0 - 54.0 fl    MPV 9.8 6.0 - 12.0 fL    Platelets 256 130 - 400 10*3/mm3    Neutrophil % 88.7 (H) 39.0 -  without any adjuvant therapy  With bilateral skin sparing mastectomies adjuvant endocrine therapy for DCIS will also not improve outcomes for weakly ER + disease    Hence we discussed surveillance per NCCN guidelines and she will pursue this with Dr. Julia Fairbanks    2) Type 1 DM  On insulin    3) Psychosocial   Coping well        Plan:     · No adjuvant therapy  · Follow with Dr. Julia Fairbanks and see me prn  · All questions answered    I appreciate the opportunity to participate in Ms. Kameron Cortez's care.     Signed By: Elizabeth Reyes MD      > 50% of this 60 min encounter spent in counseling and care co ordination 78.0 %    Lymphocyte % 3.5 (L) 15.0 - 45.0 %    Monocyte % 5.7 4.0 - 12.0 %    Eosinophil % 1.6 0.0 - 4.0 %    Basophil % 0.1 0.0 - 2.0 %    Immature Grans % 0.4 0.0 - 5.0 %    Neutrophils, Absolute 10.81 (H) 1.87 - 8.40 10*3/mm3    Lymphocytes, Absolute 0.43 (L) 0.72 - 4.86 10*3/mm3    Monocytes, Absolute 0.69 0.19 - 1.30 10*3/mm3    Eosinophils, Absolute 0.19 0.00 - 0.70 10*3/mm3    Basophils, Absolute 0.01 0.00 - 0.20 10*3/mm3    Immature Grans, Absolute 0.05 (H) 0.00 - 0.03 10*3/mm3   CBC (No Diff)   Result Value Ref Range    WBC 11.18 (H) 4.80 - 10.80 10*3/mm3    RBC 3.49 (L) 4.80 - 5.90 10*6/mm3    Hemoglobin 10.1 (L) 14.0 - 18.0 g/dL    Hematocrit 30.6 (L) 40.0 - 52.0 %    MCV 87.7 82.0 - 95.0 fL    MCH 28.9 28.0 - 32.0 pg    MCHC 33.0 33.0 - 36.0 g/dL    RDW 14.2 12.0 - 15.0 %    RDW-SD 44.9 40.0 - 54.0 fl    MPV 9.5 6.0 - 12.0 fL    Platelets 283 130 - 400 10*3/mm3   Comprehensive Metabolic Panel   Result Value Ref Range    Glucose 125 (H) 70 - 100 mg/dL    BUN 18 5 - 21 mg/dL    Creatinine 0.94 0.50 - 1.40 mg/dL    Sodium 138 135 - 145 mmol/L    Potassium 4.6 3.5 - 5.3 mmol/L    Chloride 101 98 - 110 mmol/L    CO2 30.0 24.0 - 31.0 mmol/L    Calcium 7.8 (L) 8.4 - 10.4 mg/dL    Total Protein 5.5 (L) 6.3 - 8.7 g/dL    Albumin 3.10 (L) 3.50 - 5.00 g/dL    ALT (SGPT) 31 0 - 54 U/L    AST (SGOT) 22 7 - 45 U/L    Alkaline Phosphatase 36 24 - 120 U/L    Total Bilirubin 1.5 (H) 0.1 - 1.0 mg/dL    eGFR Non African Amer 80 >60 mL/min/1.73    Globulin 2.4 gm/dL    A/G Ratio 1.3 1.1 - 2.5 g/dL    BUN/Creatinine Ratio 19.1 7.0 - 25.0    Anion Gap 7.0 4.0 - 13.0 mmol/L   CBC Auto Differential   Result Value Ref Range    WBC 12.58 (H) 4.80 - 10.80 10*3/mm3    RBC 3.38 (L) 4.80 - 5.90 10*6/mm3    Hemoglobin 9.9 (L) 14.0 - 18.0 g/dL    Hematocrit 29.9 (L) 40.0 - 52.0 %    MCV 88.5 82.0 - 95.0 fL    MCH 29.3 28.0 - 32.0 pg    MCHC 33.1 33.0 - 36.0 g/dL    RDW 14.2 12.0 - 15.0 %    RDW-SD 45.6 40.0 - 54.0 fl    MPV 9.5 6.0 -  12.0 fL    Platelets 293 130 - 400 10*3/mm3    Neutrophil % 84.4 (H) 39.0 - 78.0 %    Lymphocyte % 4.6 (L) 15.0 - 45.0 %    Monocyte % 7.6 4.0 - 12.0 %    Eosinophil % 2.6 0.0 - 4.0 %    Basophil % 0.2 0.0 - 2.0 %    Immature Grans % 0.6 0.0 - 5.0 %    Neutrophils, Absolute 10.62 (H) 1.87 - 8.40 10*3/mm3    Lymphocytes, Absolute 0.58 (L) 0.72 - 4.86 10*3/mm3    Monocytes, Absolute 0.95 0.19 - 1.30 10*3/mm3    Eosinophils, Absolute 0.33 0.00 - 0.70 10*3/mm3    Basophils, Absolute 0.02 0.00 - 0.20 10*3/mm3    Immature Grans, Absolute 0.08 (H) 0.00 - 0.03 10*3/mm3   Basic Metabolic Panel   Result Value Ref Range    Glucose 118 (H) 70 - 100 mg/dL    BUN 14 5 - 21 mg/dL    Creatinine 0.84 0.50 - 1.40 mg/dL    Sodium 135 135 - 145 mmol/L    Potassium 4.0 3.5 - 5.3 mmol/L    Chloride 99 98 - 110 mmol/L    CO2 32.0 (H) 24.0 - 31.0 mmol/L    Calcium 7.5 (L) 8.4 - 10.4 mg/dL    eGFR Non African Amer 91 >60 mL/min/1.73    BUN/Creatinine Ratio 16.7 7.0 - 25.0    Anion Gap 4.0 4.0 - 13.0 mmol/L   CBC Auto Differential   Result Value Ref Range    WBC 10.59 4.80 - 10.80 10*3/mm3    RBC 3.26 (L) 4.80 - 5.90 10*6/mm3    Hemoglobin 9.6 (L) 14.0 - 18.0 g/dL    Hematocrit 28.6 (L) 40.0 - 52.0 %    MCV 87.7 82.0 - 95.0 fL    MCH 29.4 28.0 - 32.0 pg    MCHC 33.6 33.0 - 36.0 g/dL    RDW 14.2 12.0 - 15.0 %    RDW-SD 44.7 40.0 - 54.0 fl    MPV 9.4 6.0 - 12.0 fL    Platelets 282 130 - 400 10*3/mm3    Neutrophil % 77.8 39.0 - 78.0 %    Lymphocyte % 9.3 (L) 15.0 - 45.0 %    Monocyte % 7.1 4.0 - 12.0 %    Eosinophil % 4.2 (H) 0.0 - 4.0 %    Basophil % 0.5 0.0 - 2.0 %    Immature Grans % 1.1 0.0 - 5.0 %    Neutrophils, Absolute 8.23 1.87 - 8.40 10*3/mm3    Lymphocytes, Absolute 0.99 0.72 - 4.86 10*3/mm3    Monocytes, Absolute 0.75 0.19 - 1.30 10*3/mm3    Eosinophils, Absolute 0.45 0.00 - 0.70 10*3/mm3    Basophils, Absolute 0.05 0.00 - 0.20 10*3/mm3    Immature Grans, Absolute 0.12 (H) 0.00 - 0.03 10*3/mm3   CBC (No Diff)   Result Value Ref  Range    WBC 12.95 (H) 4.80 - 10.80 10*3/mm3    RBC 3.49 (L) 4.80 - 5.90 10*6/mm3    Hemoglobin 10.2 (L) 14.0 - 18.0 g/dL    Hematocrit 30.1 (L) 40.0 - 52.0 %    MCV 86.2 82.0 - 95.0 fL    MCH 29.2 28.0 - 32.0 pg    MCHC 33.9 33.0 - 36.0 g/dL    RDW 14.5 12.0 - 15.0 %    RDW-SD 43.5 40.0 - 54.0 fl    MPV 9.5 6.0 - 12.0 fL    Platelets 327 130 - 400 10*3/mm3   CBC (No Diff)   Result Value Ref Range    WBC 13.77 (H) 4.80 - 10.80 10*3/mm3    RBC 3.70 (L) 4.80 - 5.90 10*6/mm3    Hemoglobin 10.7 (L) 14.0 - 18.0 g/dL    Hematocrit 31.8 (L) 40.0 - 52.0 %    MCV 85.9 82.0 - 95.0 fL    MCH 28.9 28.0 - 32.0 pg    MCHC 33.6 33.0 - 36.0 g/dL    RDW 14.8 12.0 - 15.0 %    RDW-SD 44.3 40.0 - 54.0 fl    MPV 9.6 6.0 - 12.0 fL    Platelets 346 130 - 400 10*3/mm3   Comprehensive Metabolic Panel   Result Value Ref Range    Glucose 120 (H) 70 - 100 mg/dL    BUN 12 5 - 21 mg/dL    Creatinine 0.89 0.50 - 1.40 mg/dL    Sodium 134 (L) 135 - 145 mmol/L    Potassium 4.3 3.5 - 5.3 mmol/L    Chloride 98 98 - 110 mmol/L    CO2 28.0 24.0 - 31.0 mmol/L    Calcium 8.7 8.4 - 10.4 mg/dL    Total Protein 5.5 (L) 6.3 - 8.7 g/dL    Albumin 3.20 (L) 3.50 - 5.00 g/dL    ALT (SGPT) 41 0 - 54 U/L    AST (SGOT) 24 7 - 45 U/L    Alkaline Phosphatase 40 24 - 120 U/L    Total Bilirubin 1.4 (H) 0.1 - 1.0 mg/dL    eGFR Non African Amer 85 >60 mL/min/1.73    Globulin 2.3 gm/dL    A/G Ratio 1.4 1.1 - 2.5 g/dL    BUN/Creatinine Ratio 13.5 7.0 - 25.0    Anion Gap 8.0 4.0 - 13.0 mmol/L   CBC (No Diff)   Result Value Ref Range    WBC 14.27 (H) 4.80 - 10.80 10*3/mm3    RBC 3.66 (L) 4.80 - 5.90 10*6/mm3    Hemoglobin 10.6 (L) 14.0 - 18.0 g/dL    Hematocrit 32.1 (L) 40.0 - 52.0 %    MCV 87.7 82.0 - 95.0 fL    MCH 29.0 28.0 - 32.0 pg    MCHC 33.0 33.0 - 36.0 g/dL    RDW 15.5 (H) 12.0 - 15.0 %    RDW-SD 45.8 40.0 - 54.0 fl    MPV 9.5 6.0 - 12.0 fL    Platelets 344 130 - 400 10*3/mm3   Basic Metabolic Panel   Result Value Ref Range    Glucose 117 (H) 70 - 100 mg/dL    BUN 15  5 - 21 mg/dL    Creatinine 0.99 0.50 - 1.40 mg/dL    Sodium 134 (L) 135 - 145 mmol/L    Potassium 4.2 3.5 - 5.3 mmol/L    Chloride 98 98 - 110 mmol/L    CO2 30.0 24.0 - 31.0 mmol/L    Calcium 8.2 (L) 8.4 - 10.4 mg/dL    eGFR Non African Amer 75 >60 mL/min/1.73    BUN/Creatinine Ratio 15.2 7.0 - 25.0    Anion Gap 6.0 4.0 - 13.0 mmol/L   Magnesium   Result Value Ref Range    Magnesium 2.0 1.4 - 2.2 mg/dL   Phosphorus   Result Value Ref Range    Phosphorus 3.9 2.5 - 4.5 mg/dL   Basic Metabolic Panel   Result Value Ref Range    Glucose 124 (H) 70 - 100 mg/dL    BUN 16 5 - 21 mg/dL    Creatinine 1.06 0.50 - 1.40 mg/dL    Sodium 135 135 - 145 mmol/L    Potassium 3.7 3.5 - 5.3 mmol/L    Chloride 100 98 - 110 mmol/L    CO2 28.0 24.0 - 31.0 mmol/L    Calcium 7.9 (L) 8.4 - 10.4 mg/dL    eGFR Non African Amer 70 >60 mL/min/1.73    BUN/Creatinine Ratio 15.1 7.0 - 25.0    Anion Gap 7.0 4.0 - 13.0 mmol/L   Magnesium   Result Value Ref Range    Magnesium 1.9 1.4 - 2.2 mg/dL   CBC Auto Differential   Result Value Ref Range    WBC 12.91 (H) 4.80 - 10.80 10*3/mm3    RBC 3.45 (L) 4.80 - 5.90 10*6/mm3    Hemoglobin 10.1 (L) 14.0 - 18.0 g/dL    Hematocrit 30.4 (L) 40.0 - 52.0 %    MCV 88.1 82.0 - 95.0 fL    MCH 29.3 28.0 - 32.0 pg    MCHC 33.2 33.0 - 36.0 g/dL    RDW 15.7 (H) 12.0 - 15.0 %    RDW-SD 48.6 40.0 - 54.0 fl    MPV 9.2 6.0 - 12.0 fL    Platelets 322 130 - 400 10*3/mm3    Neutrophil % 75.9 39.0 - 78.0 %    Lymphocyte % 10.9 (L) 15.0 - 45.0 %    Monocyte % 6.4 4.0 - 12.0 %    Eosinophil % 5.0 (H) 0.0 - 4.0 %    Basophil % 0.3 0.0 - 2.0 %    Immature Grans % 1.5 0.0 - 5.0 %    Neutrophils, Absolute 9.81 (H) 1.87 - 8.40 10*3/mm3    Lymphocytes, Absolute 1.41 0.72 - 4.86 10*3/mm3    Monocytes, Absolute 0.82 0.19 - 1.30 10*3/mm3    Eosinophils, Absolute 0.64 0.00 - 0.70 10*3/mm3    Basophils, Absolute 0.04 0.00 - 0.20 10*3/mm3    Immature Grans, Absolute 0.19 (H) 0.00 - 0.03 10*3/mm3   Creatinine, Body Fluid   Result Value Ref  Range    Creatinine, Fluid 6.5 mg/dL   CBC (No Diff)   Result Value Ref Range    WBC 13.05 (H) 4.80 - 10.80 10*3/mm3    RBC 3.58 (L) 4.80 - 5.90 10*6/mm3    Hemoglobin 10.4 (L) 14.0 - 18.0 g/dL    Hematocrit 31.5 (L) 40.0 - 52.0 %    MCV 88.0 82.0 - 95.0 fL    MCH 29.1 28.0 - 32.0 pg    MCHC 33.0 33.0 - 36.0 g/dL    RDW 15.3 (H) 12.0 - 15.0 %    RDW-SD 48.2 40.0 - 54.0 fl    MPV 9.3 6.0 - 12.0 fL    Platelets 317 130 - 400 10*3/mm3   Comprehensive Metabolic Panel   Result Value Ref Range    Glucose 126 (H) 70 - 100 mg/dL    BUN 14 5 - 21 mg/dL    Creatinine 1.19 0.50 - 1.40 mg/dL    Sodium 135 135 - 145 mmol/L    Potassium 3.8 3.5 - 5.3 mmol/L    Chloride 99 98 - 110 mmol/L    CO2 29.0 24.0 - 31.0 mmol/L    Calcium 8.1 (L) 8.4 - 10.4 mg/dL    Total Protein 5.5 (L) 6.3 - 8.7 g/dL    Albumin 3.00 (L) 3.50 - 5.00 g/dL    ALT (SGPT) 65 (H) 0 - 54 U/L    AST (SGOT) 35 7 - 45 U/L    Alkaline Phosphatase 39 24 - 120 U/L    Total Bilirubin 0.8 0.1 - 1.0 mg/dL    eGFR Non African Amer 61 >60 mL/min/1.73    Globulin 2.5 gm/dL    A/G Ratio 1.2 1.1 - 2.5 g/dL    BUN/Creatinine Ratio 11.8 7.0 - 25.0    Anion Gap 7.0 4.0 - 13.0 mmol/L   Magnesium   Result Value Ref Range    Magnesium 2.0 1.4 - 2.2 mg/dL   POC Glucose Fingerstick   Result Value Ref Range    Glucose 129 70 - 130 mg/dL   POC Glucose Fingerstick   Result Value Ref Range    Glucose 191 (H) 70 - 130 mg/dL   POC Glucose Fingerstick   Result Value Ref Range    Glucose 137 (H) 70 - 130 mg/dL   POC Glucose Fingerstick   Result Value Ref Range    Glucose 133 (H) 70 - 130 mg/dL   POC Glucose Fingerstick   Result Value Ref Range    Glucose 131 (H) 70 - 130 mg/dL   POC Glucose Fingerstick   Result Value Ref Range    Glucose 130 70 - 130 mg/dL   POC Glucose Fingerstick   Result Value Ref Range    Glucose 121 70 - 130 mg/dL   POC Glucose Fingerstick   Result Value Ref Range    Glucose 127 70 - 130 mg/dL   POC Glucose Fingerstick   Result Value Ref Range    Glucose 133 (H) 70 -  130 mg/dL   POC Glucose Fingerstick   Result Value Ref Range    Glucose 139 (H) 70 - 130 mg/dL   POC Glucose Fingerstick   Result Value Ref Range    Glucose 150 (H) 70 - 130 mg/dL   POC Glucose Fingerstick   Result Value Ref Range    Glucose 135 (H) 70 - 130 mg/dL   POC Glucose Fingerstick   Result Value Ref Range    Glucose 143 (H) 70 - 130 mg/dL   POC Glucose Fingerstick   Result Value Ref Range    Glucose 157 (H) 70 - 130 mg/dL   POC Glucose Fingerstick   Result Value Ref Range    Glucose 134 (H) 70 - 130 mg/dL   POC Glucose Fingerstick   Result Value Ref Range    Glucose 143 (H) 70 - 130 mg/dL   POC Glucose Fingerstick   Result Value Ref Range    Glucose 191 (H) 70 - 130 mg/dL   POC Glucose Fingerstick   Result Value Ref Range    Glucose 131 (H) 70 - 130 mg/dL   POC Glucose Fingerstick   Result Value Ref Range    Glucose 131 (H) 70 - 130 mg/dL   POC Glucose Fingerstick   Result Value Ref Range    Glucose 165 (H) 70 - 130 mg/dL   POC Glucose Fingerstick   Result Value Ref Range    Glucose 122 70 - 130 mg/dL   POC Glucose Fingerstick   Result Value Ref Range    Glucose 168 (H) 70 - 130 mg/dL   POC Glucose Fingerstick   Result Value Ref Range    Glucose 220 (H) 70 - 130 mg/dL   POC Glucose Fingerstick   Result Value Ref Range    Glucose 190 (H) 70 - 130 mg/dL   POC Glucose Fingerstick   Result Value Ref Range    Glucose 196 (H) 70 - 130 mg/dL   POC Glucose Fingerstick   Result Value Ref Range    Glucose 209 (H) 70 - 130 mg/dL   POC Glucose Fingerstick   Result Value Ref Range    Glucose 115 70 - 130 mg/dL   POC Glucose Fingerstick   Result Value Ref Range    Glucose 115 70 - 130 mg/dL   Tissue Pathology Exam   Result Value Ref Range    Case Report       Surgical Pathology Report                         Case: [id number]                                  Authorizing Provider:  Hernesto Hong MD        Collected:           08/01/2017 12:56 PM          Ordering Location:     [facility] OR  Received:             08/02/2017 08:09 AM          Pathologist:           Ej Schroeder MD                                                      Specimen:    Prostate                                                                                   Final Diagnosis       Prostate, radical prostatectomy:       A.  Prostatic adenocarcinoma (Minerva's grade 3+4 = 7).       B.  Tumor is present bilaterally in the gland.       C.  Perineural invasion is present.       D.  Largest tumor focus measures 1.3 cm in greatest dimension.       E.  Tumor occupies approximately 10% of the evaluated gland.       F.  Negative for evidence of extraprostatic extension.       G.  Negative for evidence of vas deferens or seminal vesicle invasion.       H.  Surgical excision margins are negative for evidence of malignancy.    AJCC STAGE:  pT2c, pNx, pMx         Synoptic Checklist       PROSTATE GLAND: Radical Prostatectomy  (Prostate Res - All Specimens)            Specimen Site:    Prostatic structure      Tumor Site:    Prostatic structure      SPECIMEN      Procedure:    Radical prostatectomy      Prostate Size:            Size (cm):    7.3 cm        Size (cm):    4.9 cm        Size (cm):    4.2 cm      Prostate Weight:            Specify Weight (g):    115      Lymph Node Sampling:    No lymph nodes present      TUMOR      Histologic Type:    Adenocarcinoma (acinar, not otherwise specified)        Clyde Pattern:    Minerva pattern          Primary Pattern:    Grade 3          Secondary Pattern:    Grade 4          Tertiary Pattern:    Not applicable          Total Clyde Score:    7        Tumor Quantitation:    Proportion (percentage) of Prostate Involved by Tumor          Specify (%):    10        Tumor Quantitation:    Tumor size (dominant nodule, if present)          Greatest Dimension (mm):    13 mm        Extraprostatic Extension:    Not identified        Seminal Vesicle Invasion (invasion of muscular wall required):    Not identified     "    Lymph-Vascular Invasion:    Not Identified        Perineural Invasion:    Present        Treatment Effect:    Not identified      MARGINS      Margins:    Margins uninvolved by invasive carcinoma      LYMPH NODES      Regional Lymph Nodes:    No nodes submitted or found      STAGE (pTNM)      Primary Tumor (pT):    +pT2c: Bilateral disease      Regional Lymph Nodes (pN):    pNX: Cannot be assessed      Distant Metastasis (pM):    Not applicable      ADDITIONAL FINDINGS      Additional Pathologic Findings:    None identified      SPECIAL STUDIES      Ancillary Studies:    Not performed      Gross Description       *Surgery - Radical prostatectomy  *Designation - \"Prostate\"  *Labelled - Patient's name and accession number  *Received - in formalin is a prostate with attached bilateral seminal vesicles and vas deferens.    PROSTATE    *Measurement - 7.3 cm  right to left; 4.9 cm apex to base; 4.2 cm anterior to posterior  *Weight - 115 g.   *Capsule - tan/pink and intact.  Smooth and glistening with a small amount of adherent soft tissue.  A bulging, intact yellow to tan nodule is bulging out of the bladder neck and measuring 2.7 x 2.4 cm.  Right lobe is inked black, left lobe is inked blue and the posterior midline is inked yellow.    *Sectioning - The prostate is sectioned from apex to base.  · Right Lobe - multiple yellow/pink periurethral nodules grossly consistent with benign hyperplasia measuring up to 2.3 cm.  There is an area of yellow/tan, firm discoloration in the anterior lateral aspect of the apical region measuring 1.1 cm  in greatest dimension and extending to within 0.1 cm  of the lateral capsule.  These areas do not appear to cross the midline.    · Left Lobe -  multiple yellow/pink periurethral nodules grossly consistent with benign hyperplasia measuring up to 2.7 cm.  There are 2 areas of yellow/tan, firm discoloration in the posterior-lateral aspect of the apical and mid regions measuring 0.7 and " 0.5 cm less than 0.1 cm from the lateral capsule and 0.2 cm from the posterior capsule.  Another area of yellow/tan, firm discoloration is identified in the posterior-lateral aspect of the base region measuring 0.4 cm in greatest dimension and extending to within 0.1 cm of the posterior lateral capsule.  These areas do not appear to cross the midline.     Right Seminal Vesicle and Vas Deferens  *Seminal vesicle measurement - 2.5 x 2.3 x 0.6 cm   *External surface - tan/brown, intact and tightly adhered to prostate capsule  *Sectioning - pink/gray and unremarkable  *Vas deferens measurement - 4.4 cm in length by 0.5 cm in diameter  *External surface - tan/brown, intact and unremarkable  *Sectioning - unremarkable    Left Seminal Vesicle and Vas Deferens  *Seminal vesicle measurement - 2.8 x 1.3 x 0.5 cm   *External surface - tan/brown, intact and tightly adhered to capsule  *Sectioning - pink/gray and unremarkable  *Vas deferens measurement - 3.8 cm in length by 0.4 cm in diameter.  *External surface - tan/brown, intact and unremarkable  *Sectioning - unremarkable    Block Summary    1A through 1C -  Apical margin shaved, sectioned and submitted from right to left  1D - Right bladder neck margin shaved and sectioned  1E - Left bladder neck margin shave and sectioned  1F - Perpendicular sections of mass bulging from bladder neck  1G [id number] - Representative sections of apical region of the right lobe  1K through 1P - Representative sections of mid region of the right lobe  1Q through 1V - Representative sections of base region of the right lobe  1W - Representative sections of right seminal vesicle and vas deferens  1X through 1Z - Representative sections of apical region of the left lobe  1AA through 1FF - Representative sections of mid region of the left lobe  1GG through 1KK - Representative sections of base region of the left lobe  1LL - Representative sections of left seminal vesicle and vas deferens               Microscopic Description       Microscopic examination reveal sections of a prostate gland demonstrating an infiltrative epithelial lesion comprised predominantly of closely spaced small round glands with minor foci of more complexly arranged to poorly formed glands also identified.  The largest tumor focus is 1.3 cm in greatest dimension and tumor appears to occupy approximately 10% of the evaluated gland.  Perineural invasion is identified, however no evidence of extraprostatic extension is present.  Sections of the vas deferens and seminal vesicles bilaterally reveal no evidence of involvement by malignancy.  The surgical excision margins are negative for evidence of malignancy.      Embedded Images     Urine Eosinophils, Rafa's Stain   Result Value Ref Range    Case Report       Surgical Pathology Report                         Case: [id number]                                  Authorizing Provider:  Jasen Doss MD            Collected:           08/02/2017 11:46 PM          Ordering Location:     [address]  Received:            08/03/2017 01:25 PM          Pathologist:           Jackie Steve MD                                                           Specimen:    Urine, Clean Catch, Urine Eosinophils, Rafa's Stain                                      Final Diagnosis       Urine, Rafa's stain:  Negative for increased urine eosinophils      Gross Description       Received in a container labeled with the patient's name, medical record number and date of birth is 100 mls of blue-green fluid.  1 smear and  1 cytospin slide is prepared for examination.        Microscopic Description       Smear and cytospin demonstrate red blood cells, neutrophils and scattered urothelial cells.  Negative for increased urine eosinophils.  Given the atypical appearance of the patient's gross urine specimen.  Dr. Steve contacted KALLI Núñez on 8/3/2017 at 15:05.  Dr. Steve conveyed that the urine  was a dark blue-green in color and may warrant further investigation.  The urine is currently saved in the refrigerator pathology department.      Embedded Images       Cystogram shows no evidence of leak    Assessment and Plan  Diagnoses and all orders for this visit:    Prostate cancer  -     PSA; Future    Catheter removed.  Postop structures and given.  Follow-up with PSA in 6 weeks.    Hernesto Hong MD  08/25/17  1:05 PM      Cc:

## 2020-11-19 NOTE — PROGRESS NOTES
Oncology Social Work  Psychosocial Assessment    Reason for Assessment:      [] Social Work Referral [x] Initial Assessment [] New Diagnosis [] Other    Advance Care Planning:  Advance Care Planning 10/27/2020   Confirm Advance Directive Yes, not on file   Does the patient have other document types -   Patient states that she may already have an AMD.  Asked her to provide a copy of this document at her next office visit. Sources of Information:    [x]Patient  []Family  []Staff  []Medical Record    Mental Status:    [x]Alert  []Lethargic  []Unresponsive   [] Unable to assess   Oriented to:  [x]Person  [x]Place  [x]Time  [x]Situation      Relationship Status:  []Single  []  [x]Significant Other/Life Partner  []  []  []    Living Circumstances:  []Lives Alone  [x]Family/Significant Other in Household  []Roommates  []Children in the Home  []Paid Caregivers  []Assisted Living Facility/Group Home  []Skilled [address]  []Homeless  []Incarcerated  []Environmental/Care Concerns  []Other:    Employment Status:  [x]Employed Full-time []Employed Part-time []Homemaker  [] Retired [] Short-Term Disability [] [facility]  [] Unemployed     Barriers to Learning:    []Language  []Developmental  []Cognitive  []Altered Mental Status  []Visual/Hearing Impairment  []Unable to Read/Write  []Motivational  [] Challenges Understanding Medical Jargon [x]No Barriers Identified      Financial/Legal Concerns:    []Uninsured  []Limited Income/Resources  []Non-Citizen  []Food Insecurity [x]No Concerns Identified   []Other:    Congregation/Spiritual/Existential:  Does patient have any spiritual or Nondenominational beliefs? [x] Yes [] No  Is the patient involved in a spiritual, gregorio or Nondenominational community? [x] Yes [] No  Patient expressing spiritual/existential angst? [] Yes [x] No  Notes: Patient identifies as Lupe. She and her fellow colleagues hold a daily prayer [place] together.        Support System:    Identified Support Person/Group:  [x]Strong  []Fair  []Limited    Coping with Illness:   [x]  Coping Well  [] Challenges Coping with Serious Illness [] Situational Depression [] Situational Anxiety [] Anticipatory Grief  [] Recent Loss [] Caregiver [place]            Narrative:   Met with the patient, and her partner, Mila Bazan, during her office visit today. 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 Patient is status post bilateral skin sparing mastectomy on 10/27/2020. Patient is ; her  passed away at the age of 63, from [address] . She has two children, a daughter and a son, ages 24 and 21. The patient and her significant other Azeem Bowser) live together, along with her two adult children. Patient has a PCP - Dr. Lissette Solo. She is an  in [facility] [employer] [facility]. Her school has been meeting in person this year. She is currently on short term disability as she recovers from surgery. Her significant other, Mila Bazan, works in [employer], but is taking a 6 week leave of absence as she recovers from surgery. Patient has excellent support from her fellow colleagues. They hold a daily prayer [place] together. Invited the patient to the upcoming Virtual General Cancer Support Group, led by this , on November 23, 2020 at [facility].  Patient provided her e-mail address, [email or web address], in order to receive the Zoom link. Patient was also provided a beauty bag through the [facility] Fe[facility] [employer] [facility] program.    Provided this 's contact information as well as information regarding the complementary services provided by the [facility], explaining that the center is currently closed due to COVID-19 restrictions. Explained that meditation and music therapy are both being offered virtually. Plan:   1.   Introduced self and role of this  in the [employer]. 2.  Informed the patient of the [facility] and available resources there. 3.  Continue to meet with the patient when she returns to the clinic for ongoing assessment of the patients adjustment to her diagnosis and treatment. 4.  Ongoing psychosocial support as desired by patient.       Referral:     Complementary therapies referral  Support Groups referral  [employer], LCSW

## 2020-11-19 NOTE — PROGRESS NOTES
Matt Will is a 48 y.o. female  Chief Complaint   Patient presents with    New Patient    Breast Cancer     1. Have you been to the ER, urgent care clinic since your last visit? Hospitalized since your last visit? Yes- 10/2020    2. Have you seen or consulted any other health care providers outside of the [address] [place] [address] since your last visit? Include any pap smears or colon screening.   no

## 2020-12-10 ENCOUNTER — VIRTUAL VISIT (OUTPATIENT)
Dept: ENDOCRINOLOGY | Age: 55
End: 2020-12-10
Payer: COMMERCIAL

## 2020-12-10 DIAGNOSIS — E10.9 TYPE 1 DIABETES MELLITUS WITHOUT COMPLICATION (HCC): Primary | ICD-10-CM

## 2020-12-10 PROCEDURE — 99213 OFFICE O/P EST LOW 20 MIN: CPT | Performed by: INTERNAL MEDICINE

## 2020-12-10 NOTE — PROGRESS NOTES
Chief Complaint   Patient presents with    Diabetes     pcp and pharmacy verifed. Eye exam due. DOXY. ME   Records since last visit reviewed          **THIS IS A VIRTUAL VISIT VIA A VIDEO ENCOUNTER. PATIENT AGREED TO HAVE THEIR CARE DELIVERED OVER VIDEO IN PLACE OF THEIR REGULARLY SCHEDULED OFFICE VISIT**      History of Present Illness: Hal Chong is a 48 y.o. female here for follow up of diabetes. Pt notes that over [address] day weekend (2017)  she \"came down with a Virus and sinus infection\" She was put on Abx and she noted polyuria, polydipsia and blurred vision. She had also lost 10 pounds in a short period of time. She saw her eye doctor who noted swelling in her cornia (will request these records). The swelling improved but did not normalize, her eye doctor recommended she get labs drawn and her PCP cecilia labs, her BG was 300 and her PCP started her on Glipizide. She continued to have symptoms so she checked her BG on a glucometer said \"high\" so she went to the ED and her BG >600. Her A1C was 13.1%. AB testing showed positive FRANCE and CRP. At our initial visit in August 2017 I tested her for autoimmune DM   Her insulin, proinsulin and c-peptide were low normal and one of her 4 antibodies were positive. (ZN-8). This fits with Auto-immune DM Type 1/NII. Pt was diagnosed with breast cancer, in her right breast in October 2020, she opted for bilateral mastectomy. She was taken to the OR by Dr. Michelle Douglas. The surgical pathology found 2 foci of microinvasion in the left breast as well. She is being followed by Dr. Bonifacio Germain of oncology, who felt that the surgery was complete and did not need any further treatment. It was decided she did not need any chemotherapy or radiation. She just started back to work 1/2 time this week. She is still taking Basaglar 12 units daily and Novolog 1:15 carb ratio. She is testing her BGs 4-6 times per day.   She notes that \"I don't know how much I am going to eat at the meal so I don't bolus till after I have eaten. \"  \"I am a slow eater so I have to be careful about when I give my insulin. I try to limit my carb intake to make it safer and better. \"    She is an  and she notes this is a source of a lot of stress. Pt notes she does not eat three meals, but tends to \"graze\" during the day. She eats every 2 hours and will cover her carbs as needed. She will have dinner around 5-6PM.  Last night for dinner she had 1 slice of pizza and 3 bread sticks and diet soda. No history of vascular disease. No history of neuropathy, or nephropathy. Last eye exam was April 2019, no retinopathy. She had a BCC removed on 7/1/19. It required Moh's and \"they had to go a little deeper. Cassia Copeland got it all\". She had follow up in January 2020 and \"there were no concerning spots or anything. \"  She has an appointment with Dermatology in September. \"I have a spot on my nose and I am pretty sure I will need Moh's for this\". Current Outpatient Medications   Medication Sig    insulin glargine (Basaglar KwikPen U-100 Insulin) 100 unit/mL (3 mL) inpn 15 units every day. Dispense 5 pens.  fexofenadine-pseudoephedrine (Allegra-D 12 Hour)  mg Tb12 Take 1 Tab by mouth every twelve (12) hours.  lancets (BD Ultra Fine Lancets) 33 gauge misc Check blood sugar 4 times per day    glucose blood VI test strips (Accu-Chek Indio Plus test strp) strip CHECK SUGARS 4 TIMES PER DAY    NovoLOG Flexpen U-100 Insulin 100 unit/mL (3 mL) inpn Inject 2-10 units up to 3 times daily with higher carb meal.    Gauri Pen Needle 32 gauge x 5/32\" ndle 4 shots daily    IBUPROFEN PO Take 400 mg by mouth as needed.  Blood-Glucose Meter (ACCU-CHEK INDIO PLUS METER) misc Check sugars 4 times per day    acyclovir (ZOVIRAX) 200 mg capsule as needed.  multivitamin (ONE A DAY) tablet Take 1 Tab by mouth daily.     Mupirocin 2 % okit mupirocin 2 % topical ointment     No current facility-administered medications for this visit. Allergies   Allergen Reactions    Tree Nut Anaphylaxis     Not true allergy. Peanut sensitivity via allergy testing     Review of Systems:  - Eyes: no blurry vision or double vision  - Cardiovascular: no chest pain  - Respiratory: no shortness of breath  - Musculoskeletal: no myalgias  - Neurological: no numbness/tingling in extremities    Physical Examination:  Last menstrual period 10/02/2017.  - GENERAL: NCAT, Appears well nourished   - EYES: EOMI, non-icteric, no proptosis   - Ear/Nose/Throat: NCAT, no visible inflammation or masses   - CARDIOVASCULAR: no cyanosis, no visible JVD   - RESPIRATORY: respiratory effort normal without any distress or labored breathing   - MUSCULOSKELETAL: Normal ROM of neck and upper extremities observed   - SKIN: No rash on face   - NEUROLOGIC:  No facial asymmetry (Cranial nerve 7 motor function), No gaze palsy   - PSYCHIATRIC: Normal affect, Normal insight and judgement         Data Reviewed:   None    Assessment/Plan:   1) DM > Pt reports her BG are running in a good range on her current regimen. Pt encouraged to keep up the good work and continue the PixelFlow 12 daily and continue Novolog 1:15 carb ratio. We discussed insulin pump therapy, but she would prefer to continue her current regimen. She is controlling her BGs very well with this regimen and I agree with her. Pt to check her BGs AC/HS (4 times per day) and mail her BG logs to me in 6 weeks. BP has been at goal on no HTN agents and her lipid panel has been at goal on no anti-lipid agents. Will order an A1C, Lipid panel, TSH, Urine MA and CMP. Pt voices understanding and agreement with the plan.       RTC based on the above results          Copy sent to:  Dr. Yolanda Fields

## 2020-12-14 RX ORDER — INSULIN GLARGINE 100 [IU]/ML
INJECTION, SOLUTION SUBCUTANEOUS
Qty: 15 ML | Refills: 3 | Status: SHIPPED | OUTPATIENT
Start: 2020-12-14 | End: 2022-02-20 | Stop reason: SDUPTHER

## 2020-12-14 RX ORDER — INSULIN ASPART 100 [IU]/ML
INJECTION, SOLUTION INTRAVENOUS; SUBCUTANEOUS
Qty: 15 ML | Refills: 6 | Status: SHIPPED | OUTPATIENT
Start: 2020-12-14 | End: 2022-02-20 | Stop reason: SDUPTHER

## 2020-12-14 NOTE — TELEPHONE ENCOUNTER
----- Message from Regulo Cortez sent at 12/11/2020  4:28 PM EST -----  Regarding: Prescription Question  Contact: [phone]  Dr Gail Kang  At your convenience Can you please switch my two insulin prescriptions, Bagaslar 15 units/day and novolog [facility]   [address] [facility]  [phone]    I'm having issues getting things filled at [place] correctly due to [facility] Arsh Blair. Thank you! Have a great weekend!   Deneen Fuentes

## 2020-12-15 LAB
ALBUMIN SERPL-MCNC: 4.5 G/DL (ref 3.8–4.9)
ALBUMIN/CREAT UR: 11 MG/G CREAT (ref 0–29)
ALBUMIN/GLOB SERPL: 1.8 {RATIO} (ref 1.2–2.2)
ALP SERPL-CCNC: 78 IU/L (ref 39–117)
ALT SERPL-CCNC: 14 IU/L (ref 0–32)
AST SERPL-CCNC: 18 IU/L (ref 0–40)
BILIRUB SERPL-MCNC: 0.5 MG/DL (ref 0–1.2)
BUN SERPL-MCNC: 11 MG/DL (ref 6–24)
BUN/CREAT SERPL: 12 (ref 9–23)
CALCIUM SERPL-MCNC: 9.9 MG/DL (ref 8.7–10.2)
CHLORIDE SERPL-SCNC: 100 MMOL/L (ref 96–106)
CHOLEST SERPL-MCNC: 205 MG/DL (ref 100–199)
CO2 SERPL-SCNC: 26 MMOL/L (ref 20–29)
CREAT SERPL-MCNC: 0.9 MG/DL (ref 0.57–1)
CREAT UR-MCNC: 239.5 MG/DL
EST. AVERAGE GLUCOSE BLD GHB EST-MCNC: 146 MG/DL
GLOBULIN SER CALC-MCNC: 2.5 G/DL (ref 1.5–4.5)
GLUCOSE SERPL-MCNC: 142 MG/DL (ref 65–99)
HBA1C MFR BLD: 6.7 % (ref 4.8–5.6)
HDLC SERPL-MCNC: 94 MG/DL
INTERPRETATION, 910389: NORMAL
LDLC SERPL CALC-MCNC: 101 MG/DL (ref 0–99)
Lab: NORMAL
MICROALBUMIN UR-MCNC: 26 UG/ML
POTASSIUM SERPL-SCNC: 4 MMOL/L (ref 3.5–5.2)
PROT SERPL-MCNC: 7 G/DL (ref 6–8.5)
SODIUM SERPL-SCNC: 139 MMOL/L (ref 134–144)
TRIGL SERPL-MCNC: 53 MG/DL (ref 0–149)
TSH SERPL DL<=0.005 MIU/L-ACNC: 3.45 UIU/ML (ref 0.45–4.5)
VLDLC SERPL CALC-MCNC: 10 MG/DL (ref 5–40)

## 2021-01-06 ENCOUNTER — OFFICE VISIT (OUTPATIENT)
Dept: PRIMARY CARE CLINIC | Age: 56
End: 2021-01-06

## 2021-01-06 VITALS
BODY MASS INDEX: 23.16 KG/M2 | HEIGHT: 65 IN | SYSTOLIC BLOOD PRESSURE: 98 MMHG | WEIGHT: 139 LBS | HEART RATE: 75 BPM | RESPIRATION RATE: 16 BRPM | OXYGEN SATURATION: 94 % | TEMPERATURE: 96.4 F | DIASTOLIC BLOOD PRESSURE: 68 MMHG

## 2021-01-06 DIAGNOSIS — R31.9 HEMATURIA, UNSPECIFIED TYPE: ICD-10-CM

## 2021-01-06 DIAGNOSIS — N30.91 CYSTITIS WITH HEMATURIA: Primary | ICD-10-CM

## 2021-01-06 LAB
BILIRUB UR QL STRIP: NEGATIVE
GLUCOSE UR-MCNC: NEGATIVE MG/DL
KETONES P FAST UR STRIP-MCNC: NEGATIVE MG/DL
PH UR STRIP: 5.5 [PH] (ref 4.6–8)
PROT UR QL STRIP: NEGATIVE
SP GR UR STRIP: 1.02 (ref 1–1.03)
UA UROBILINOGEN AMB POC: NORMAL (ref 0.2–1)
URINALYSIS CLARITY POC: NORMAL
URINALYSIS COLOR POC: NORMAL
URINE BLOOD POC: NORMAL
URINE LEUKOCYTES POC: NORMAL
URINE NITRITES POC: POSITIVE

## 2021-01-06 PROCEDURE — 99213 OFFICE O/P EST LOW 20 MIN: CPT | Performed by: NURSE PRACTITIONER

## 2021-01-06 PROCEDURE — 81002 URINALYSIS NONAUTO W/O SCOPE: CPT | Performed by: NURSE PRACTITIONER

## 2021-01-06 RX ORDER — NITROFURANTOIN 25; 75 MG/1; MG/1
100 CAPSULE ORAL 2 TIMES DAILY
Qty: 10 CAP | Refills: 0 | Status: SHIPPED | OUTPATIENT
Start: 2021-01-06 | End: 2021-01-11

## 2021-01-06 NOTE — PATIENT INSTRUCTIONS
Urinary Tract Infection in Women: Care Instructions Your Care Instructions A urinary tract infection, or UTI, is a general term for an infection anywhere between the kidneys and the urethra (where urine comes out). Most UTIs are bladder infections. They often cause pain or burning when you urinate. UTIs are caused by bacteria and can be cured with antibiotics. Be sure to complete your treatment so that the infection goes away. Follow-up care is a key part of your treatment and safety. Be sure to make and go to all appointments, and call your doctor if you are having problems. It's also a good idea to know your test results and keep a list of the medicines you take. How can you care for yourself at home? · Take your antibiotics as directed. Do not stop taking them just because you feel better. You need to take the full course of antibiotics. · Drink extra water and other fluids for the next day or two. This may help wash out the bacteria that are causing the infection. (If you have kidney, heart, or liver disease and have to limit fluids, talk with your doctor before you increase your fluid intake.) · Avoid drinks that are carbonated or have caffeine. They can irritate the bladder. · Urinate often. Try to empty your bladder each time. · To relieve pain, take a hot bath or lay a heating pad set on low over your lower belly or genital area. Never go to sleep with a heating pad in place. To prevent UTIs · Drink plenty of water each day. This helps you urinate often, which clears bacteria from your system. (If you have kidney, heart, or liver disease and have to limit fluids, talk with your doctor before you increase your fluid intake.) · Urinate when you need to. · Urinate right after you have sex. · Change sanitary pads often. · Avoid douches, bubble baths, feminine hygiene sprays, and other feminine hygiene products that have deodorants. · After going to the bathroom, wipe from front to back. When should you call for help? Call your doctor now or seek immediate medical care if: 
  · Symptoms such as fever, chills, nausea, or vomiting get worse or appear for the first time.  
  · You have new pain in your back just below your rib cage. This is called flank pain.  
  · There is new blood or pus in your urine.  
  · You have any problems with your antibiotic medicine. Watch closely for changes in your health, and be sure to contact your doctor if: 
  · You are not getting better after taking an antibiotic for 2 days.  
  · Your symptoms go away but then come back. Where can you learn more? Go to http://www.gray.com/ Enter U437 in the search box to learn more about \"Urinary Tract Infection in Women: Care Instructions. \" Current as of: June 29, 2020               Content Version: 12.6 © 7767-9192 [employer]. Care instructions adapted under license by [employer] (which disclaims liability or warranty for this information). If you have questions about a medical condition or this instruction, always ask your healthcare professional. [address] any warranty or liability for your use of this information.

## 2021-01-06 NOTE — PROGRESS NOTES
HISTORY OF PRESENT ILLNESS  Derrill Nissen is a 50 y.o. female. Patient presents with a 3 day history of abdominal pain, malodor and blood in urine. Recently returned to work as a  and  Admits to having intercourse recently. Denies fever, vaginal discharge or flank pain. Took one pill  she had left from a previous UTI and AZO this am.   As of note, Type  I diabetic and recent mastectomy surgery in October. Urinary Pain   The history is provided by the patient. Pertinent negatives include no abdominal pain. Review of Systems   Constitutional: Negative for fever and malaise/fatigue. Respiratory: Negative for shortness of breath. Gastrointestinal: Negative for abdominal pain, blood in stool and constipation. Musculoskeletal: Negative for myalgias. Skin: Negative for rash. Neurological: Negative for headaches. Past Medical History:   Diagnosis Date    Cancer ([facility] [place] [address] 09/2020    RIGHT BREAST CA    Diabetes ([facility] [address] 07/2017    Type 1 diabetes    Menopause      Past Surgical History:   Procedure Laterality Date    HX APPENDECTOMY      HX BILATERAL MASTECTOMY  10/2020    HX BREAST RECONSTRUCTION Bilateral 10/27/2020    BILATERAL BREAST RECONSTRUCTION WITH GEL IMPLANTS performed by Michelle Ramirez MD at [facility]  [id number]    HX COLONOSCOPY      HX MASTECTOMY Bilateral 10/27/2020    BILATERAL SKIN & NIPPLE SPARING MASTECTOMIES performed by Lakeisha Rico MD at [address] HX OTHER SURGICAL      MOHS PROCEDURE X 3    IMPLANT BREAST SILICONE/EQ Bilateral     2008    FL BREAST SURGERY PROCEDURE UNLISTED  2008    BREAST AUGMENTATION     Allergies   Allergen Reactions    Tree Nut Anaphylaxis     Not true allergy. Peanut sensitivity via allergy testing       Physical Exam  Vitals signs and nursing note reviewed. Constitutional:       Appearance: Normal appearance. HENT:      Head: Normocephalic.    Cardiovascular:      Rate and Rhythm: Normal rate. Pulses: Normal pulses. Pulmonary:      Effort: Pulmonary effort is normal.   Abdominal:      Tenderness: There is no right CVA tenderness or left CVA tenderness. Neurological:      Mental Status: She is alert. Psychiatric:         Mood and Affect: Mood normal.       Results for orders placed or performed in visit on 01/06/21   AMB POC URINALYSIS DIP STICK MANUAL W/O MICRO   Result Value Ref Range    Color (UA POC) Orange     Clarity (UA POC) Slightly Cloudy     Glucose (UA POC) Negative Negative    Bilirubin (UA POC) Negative Negative    Ketones (UA POC) Negative Negative    Specific gravity (UA POC) 1.020 1.001 - 1.035    Blood (UA POC) 2+ Negative    pH (UA POC) 5.5 4.6 - 8.0    Protein (UA POC) Negative Negative    Urobilinogen (UA POC) 0.2 mg/dL 0.2 - 1    Nitrites (UA POC) Positive Negative    Leukocyte esterase (UA POC) 1+ Negative       ASSESSMENT and PLAN    ICD-10-CM ICD-9-CM    1. Cystitis with hematuria  N30.91 595.9 CULTURE, URINE      AMB POC URINALYSIS DIP STICK MANUAL W/O MICRO   2. Hematuria, unspecified type  R31.9 599.70 CULTURE, URINE      AMB POC URINALYSIS DIP STICK MANUAL W/O MICRO     Encounter Diagnoses   Name Primary?  Cystitis with hematuria Yes    Hematuria, unspecified type      Orders Placed This Encounter    CULTURE, URINE    AMB POC URINALYSIS DIP STICK MANUAL W/O MICRO    nitrofurantoin, macrocrystal-monohydrate, (MACROBID) 100 mg capsule   Result of urinalysis reviewed with patient. Medication as directed. May continue AZO for 1-2 more days. Increase fluids  Urine culture sent. It was a pleasure to see you in the office today. Please call if you have any further questions or concerns. I am available through the portal system.      Signed By: DILCIA Hand     January 6, 2021

## 2021-01-06 NOTE — PROGRESS NOTES
Chief Complaint   Patient presents with    Urinary Pain     blood in urine, frequency, type 1 diabetic,         1. Have you been to the ER, urgent care clinic since your last visit? no  Hospitalized since your last visit? Yes OCT. 27, 2020 double mastectomy    2. Have you seen or consulted any other health care providers outside of the [address] [place] [address] since your last visit? Include any pap smears or colon screening.   no

## 2021-01-08 LAB — BACTERIA UR CULT: ABNORMAL

## 2021-05-07 ENCOUNTER — VIRTUAL VISIT (OUTPATIENT)
Dept: ENDOCRINOLOGY | Age: 56
End: 2021-05-07
Payer: COMMERCIAL

## 2021-05-07 DIAGNOSIS — E10.9 TYPE 1 DIABETES MELLITUS WITHOUT COMPLICATION (HCC): ICD-10-CM

## 2021-05-07 DIAGNOSIS — E10.9 TYPE 1 DIABETES MELLITUS WITHOUT COMPLICATION (HCC): Primary | ICD-10-CM

## 2021-05-07 PROCEDURE — 99214 OFFICE O/P EST MOD 30 MIN: CPT | Performed by: INTERNAL MEDICINE

## 2021-05-07 NOTE — PROGRESS NOTES
Chief Complaint   Patient presents with    Diabetes     pcp and pharmacy verified. Eye exam: 2020. DOXY. ME   Records since last visit reviewed          **THIS IS A VIRTUAL VISIT VIA A VIDEO ENCOUNTER. PATIENT AGREED TO HAVE THEIR CARE DELIVERED OVER VIDEO IN PLACE OF THEIR REGULARLY SCHEDULED OFFICE VISIT**      History of Present Illness: Popeye Packer is a 50 y.o. female here for follow up of diabetes. Pt notes that over [address] [facility] [place] day weekend (2017)  she \"came down with a Virus and sinus infection\" She was put on Abx and she noted polyuria, polydipsia and blurred vision. She had also lost 10 pounds in a short period of time. She saw her eye doctor who noted swelling in her cornia (will request these records). The swelling improved but did not normalize, her eye doctor recommended she get labs drawn and her PCP cecilia labs, her BG was 300 and her PCP started her on Glipizide. She continued to have symptoms so she checked her BG on a glucometer said \"high\" so she went to the ED and her BG >600. Her A1C was 13.1%. AB testing showed positive FRANCE and CRP. At our initial visit in August 2017 I tested her for autoimmune DM   Her insulin, proinsulin and c-peptide were low normal and one of her 4 antibodies were positive. (ZN-8). This fits with Auto-immune DM Type 1/NII. Pt was diagnosed with breast cancer, in her right breast in October 2020, she opted for bilateral mastectomy. She was taken to the OR by Dr. Parth Mccormack. The surgical pathology found 2 foci of microinvasion in the left breast as well. She has follow up with Dr. Thompson Funes next week. She is being followed by Dr. Harsh Polanco of oncology, who felt that the surgery was complete and did not need any further treatment. It was decided she did not need any chemotherapy or radiation. Since our last visit she she started back to work and she noted that once she got medical clearance she restarted her physical activity and exercise.   She notes that her clearance was in February. She now gets up at 515 every morning and does her Cardio and exercise. \"My sugars are looking a lot better, but when I am less active my BGs are higher\". She is still taking Basaglar 12 units daily and Novolog 1:15 carb ratio. She is testing her BGs 4-6 times per day. Her FBGs run in the 110-130s range, pre-lunch 110-130 and HS in the 90-110s range. She denies issues of hypoglycemia (\"unless I have done a poor job of bolusing and counting carbs, or if I am more active in the evening\"). She notes that \"I don't know how much I am going to eat at the meal so I don't bolus till after I have eaten. \"  \"I am a slow eater so I have to be careful about when I give my insulin. I try to limit my carb intake to make it safer and better. \"    She is an  and she notes this is a source of a lot of stress. Pt notes she does not eat three meals, but tends to \"graze\" during the day. She eats every 2 hours and will cover her carbs as needed. She will have dinner around 6PM.  Last night for dinner she had an apple, \"sunbutter\" and diet soda. No history of vascular disease. No history of neuropathy, or nephropathy. Last eye exam was August 2020, no retinopathy. She had a BCC removed on 7/1/19. It required Moh's and \"they had to go a little deeper. Naty Cheney got it all\". She had follow up in January 2020 and \"there were no concerning spots or anything. \"  She did not need any surgery on the spot on her nose. She sees dermatology every 6 months. \"I have had Moh's surgery 3 times\". Current Outpatient Medications   Medication Sig    insulin glargine (Basaglar KwikPen U-100 Insulin) 100 unit/mL (3 mL) inpn 15 units every day. Dispense 5 pens. (Patient taking differently: 12 units every day.  Dispense 5 pens.)    NovoLOG Flexpen U-100 Insulin 100 unit/mL (3 mL) inpn Inject 2-10 units up to 3 times daily with higher carb meal.    lancets (BD Ultra Fine Lancets) 33 gauge misc Check blood sugar 4 times per day    glucose blood VI test strips (Accu-Chek Indio Plus test strp) strip CHECK SUGARS 4 TIMES PER DAY    Gauri Pen Needle 32 gauge x 5/32\" ndle 4 shots daily    IBUPROFEN PO Take 400 mg by mouth as needed.  Blood-Glucose Meter (ACCU-CHEK INDIO PLUS METER) misc Check sugars 4 times per day    acyclovir (ZOVIRAX) 200 mg capsule as needed.  multivitamin (ONE A DAY) tablet Take 1 Tab by mouth daily. No current facility-administered medications for this visit. Allergies   Allergen Reactions    Tree Nut Anaphylaxis     Not true allergy. Peanut sensitivity via allergy testing     Review of Systems:  - Eyes: no blurry vision or double vision  - Cardiovascular: no chest pain  - Respiratory: no shortness of breath  - Musculoskeletal: no myalgias  - Neurological: no numbness/tingling in extremities    Physical Examination:  Last menstrual period 10/02/2017.  - GENERAL: NCAT, Appears well nourished   - EYES: EOMI, non-icteric, no proptosis   - Ear/Nose/Throat: NCAT, no visible inflammation or masses   - CARDIOVASCULAR: no cyanosis, no visible JVD   - RESPIRATORY: respiratory effort normal without any distress or labored breathing   - MUSCULOSKELETAL: Normal ROM of neck and upper extremities observed   - SKIN: No rash on face   - NEUROLOGIC:  No facial asymmetry (Cranial nerve 7 motor function), No gaze palsy   - PSYCHIATRIC: Normal affect, Normal insight and judgement     Data Reviewed:   None    Assessment/Plan:   1) DM > Pt reports her BG are running in a good range on her current regimen. Pt encouraged to keep up the good work and continue the Loehmann's 12 daily and continue Novolog 1:15 carb ratio. We discussed insulin pump therapy, but she would prefer to continue her current regimen. She is controlling her BGs very well with this regimen and I agree with her. Pt to check her BGs AC/HS (4 times per day) and mail her BG logs to me in 6 weeks.   BP has been at goal on no HTN agents. Her TC and LDL had increased at our last visit. Pt has been making adjustments to diet and lifestyle, to help improve this. Will repeat the lipid panel and CMP. If the TC and LDL are still elevated, we will need to discuss starting a lipid lowering agent. Pt voices understanding and agreement with the plan. RTC 4 months.            Copy sent to:  Dr. Talia Littlejohn

## 2021-05-10 ENCOUNTER — OFFICE VISIT (OUTPATIENT)
Dept: SURGERY | Age: 56
End: 2021-05-10
Payer: COMMERCIAL

## 2021-05-10 DIAGNOSIS — C50.911 DUCTAL CARCINOMA IN SITU (DCIS) OF RIGHT BREAST WITH MICROINVASIVE COMPONENT (HCC): Primary | ICD-10-CM

## 2021-05-10 DIAGNOSIS — Z85.3 HISTORY OF BREAST CANCER IN FEMALE: ICD-10-CM

## 2021-05-10 DIAGNOSIS — Z90.13 S/P MASTECTOMY, BILATERAL: ICD-10-CM

## 2021-05-10 PROCEDURE — 99213 OFFICE O/P EST LOW 20 MIN: CPT | Performed by: NURSE PRACTITIONER

## 2021-05-10 NOTE — PROGRESS NOTES
HISTORY OF PRESENT ILLNESS  Yuliet Chi is a 50 y.o. female. HPI Established patient presents for follow-up to RIGHT breast cancer. No breast concerns and no pain. Breast history -   Referring - [facility]  · 9/28/2020: R breast biopsy- 12:00 O clock- DCIS - ER and TN negative, 10: 00 O clock DCIS  · 10/27/2020: Bilateral skin sparing  mastectomy - Dr. Sayra Urbano and Dr. Magdy Collazo   · DCIS and microinvasive breast cancer. 2 negative nodes, ER 10%, TN negative  · [id number]  · 11/2020 - consult with Dr. Carisa Ca - no adjuvant therapy      Family history -  Paternal GM had breast cancer, diagnosed late 63's. Maternal aunt, 52's  Maternal aunt, 52's. OB History    No obstetric history on file. Obstetric Comments   Menarche:  15. LMP: 2017. # of Children:  2. Age at Delivery of First Child:  36.   Hysterectomy/oophorectomy:  NO/NO. Breast Bx:  No prior. Hx of Breast Feeding:  yes. BCP:  yes. Hormone therapy:  no.                    Past Surgical History:   Procedure Laterality Date    HX APPENDECTOMY      HX BILATERAL MASTECTOMY  10/2020    HX BREAST RECONSTRUCTION Bilateral 10/27/2020    BILATERAL BREAST RECONSTRUCTION WITH GEL IMPLANTS performed by Remonia Phalen, MD at [facility]  [id number]    HX COLONOSCOPY      HX MASTECTOMY Bilateral 10/27/2020    BILATERAL SKIN & NIPPLE SPARING MASTECTOMIES performed by Fabiola Goldmann, MD at [address] [place] [address] HX OTHER SURGICAL      MOHS PROCEDURE X 3    IMPLANT BREAST SILICONE/EQ Bilateral     2008    TN BREAST SURGERY PROCEDURE UNLISTED  2008    BREAST AUGMENTATION         ROS    Physical Exam  Constitutional:       Appearance: Normal appearance. Chest:      Breasts:         Right: Absent. Left: Absent. Comments: Bilateral mastectomies with implant reconstruction  Musculoskeletal: Normal range of motion.       Comments: UE x 2   Lymphadenopathy:      Upper Body:      Right upper body: No supraclavicular or axillary adenopathy. Left upper body: No supraclavicular or axillary adenopathy. Neurological:      Mental Status: She is alert. Psychiatric:         Attention and Perception: Attention normal.         Mood and Affect: Mood normal.         Speech: Speech normal.         Behavior: Behavior normal.         ASSESSMENT and PLAN  Encounter Diagnoses   Name Primary? 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 Reviewed normal post treatment findings and s/sx of recurrence. Reviewed follow-up breast imaging PRN. Continues care with Dr. Ashu Londono - next appointment in 2021 to evaluate if fat grafting is needed. RTC in 6 months or sooner PRN. She is comfortable with this plan. All questions answered and she stated understanding. Total time spent for this patient - 20 minutes.

## 2021-06-22 LAB
ALBUMIN SERPL-MCNC: 4.2 G/DL (ref 3.8–4.9)
ALBUMIN/GLOB SERPL: 2 {RATIO} (ref 1.2–2.2)
ALP SERPL-CCNC: 57 IU/L (ref 48–121)
ALT SERPL-CCNC: 20 IU/L (ref 0–32)
AST SERPL-CCNC: 19 IU/L (ref 0–40)
BILIRUB SERPL-MCNC: 0.5 MG/DL (ref 0–1.2)
BUN SERPL-MCNC: 8 MG/DL (ref 6–24)
BUN/CREAT SERPL: 10 (ref 9–23)
CALCIUM SERPL-MCNC: 8.9 MG/DL (ref 8.7–10.2)
CHLORIDE SERPL-SCNC: 108 MMOL/L (ref 96–106)
CHOLEST SERPL-MCNC: 193 MG/DL (ref 100–199)
CO2 SERPL-SCNC: 27 MMOL/L (ref 20–29)
CREAT SERPL-MCNC: 0.83 MG/DL (ref 0.57–1)
EST. AVERAGE GLUCOSE BLD GHB EST-MCNC: 140 MG/DL
GLOBULIN SER CALC-MCNC: 2.1 G/DL (ref 1.5–4.5)
GLUCOSE SERPL-MCNC: 89 MG/DL (ref 65–99)
HBA1C MFR BLD: 6.5 % (ref 4.8–5.6)
HDLC SERPL-MCNC: 100 MG/DL
IMP & REVIEW OF LAB RESULTS: NORMAL
LDLC SERPL CALC-MCNC: 86 MG/DL (ref 0–99)
POTASSIUM SERPL-SCNC: 4.2 MMOL/L (ref 3.5–5.2)
PROT SERPL-MCNC: 6.3 G/DL (ref 6–8.5)
SODIUM SERPL-SCNC: 145 MMOL/L (ref 134–144)
TRIGL SERPL-MCNC: 32 MG/DL (ref 0–149)
VLDLC SERPL CALC-MCNC: 7 MG/DL (ref 5–40)

## 2021-07-01 RX ORDER — BLOOD SUGAR DIAGNOSTIC
STRIP MISCELLANEOUS
Qty: 400 STRIP | Refills: 0 | Status: SHIPPED | OUTPATIENT
Start: 2021-07-01 | End: 2021-09-24 | Stop reason: SDUPTHER

## 2021-07-01 RX ORDER — PEN NEEDLE, DIABETIC 32GX 5/32"
NEEDLE, DISPOSABLE MISCELLANEOUS
Qty: 400 PEN NEEDLE | Refills: 0 | Status: SHIPPED | OUTPATIENT
Start: 2021-07-01

## 2021-08-31 DIAGNOSIS — E10.9 TYPE 1 DIABETES MELLITUS WITHOUT COMPLICATION (HCC): Primary | ICD-10-CM

## 2021-09-16 ENCOUNTER — OFFICE VISIT (OUTPATIENT)
Dept: ENDOCRINOLOGY | Age: 56
End: 2021-09-16
Payer: COMMERCIAL

## 2021-09-16 VITALS
HEART RATE: 63 BPM | HEIGHT: 65 IN | WEIGHT: 136.2 LBS | DIASTOLIC BLOOD PRESSURE: 86 MMHG | BODY MASS INDEX: 22.69 KG/M2 | SYSTOLIC BLOOD PRESSURE: 134 MMHG

## 2021-09-16 DIAGNOSIS — E10.9 TYPE 1 DIABETES MELLITUS WITHOUT COMPLICATION (HCC): Primary | ICD-10-CM

## 2021-09-16 LAB — HBA1C MFR BLD HPLC: 6.8 %

## 2021-09-16 PROCEDURE — 99214 OFFICE O/P EST MOD 30 MIN: CPT | Performed by: INTERNAL MEDICINE

## 2021-09-16 PROCEDURE — 83036 HEMOGLOBIN GLYCOSYLATED A1C: CPT | Performed by: INTERNAL MEDICINE

## 2021-09-16 NOTE — PROGRESS NOTES
Chief Complaint   Patient presents with    Diabetes     pcp and pharmacy verified   Records since last visit reviewed     History of Present Illness: Carmelo Soto is a 50 y.o. female here for follow up of diabetes. Pt notes that over [address] [facility] [place] day weekend (2017)  she \"came down with a Virus and sinus infection\" She was put on Abx and she noted polyuria, polydipsia and blurred vision. She had also lost 10 pounds in a short period of time. She saw her eye doctor who noted swelling in her cornia (will request these records). The swelling improved but did not normalize, her eye doctor recommended she get labs drawn and her PCP cecilia labs, her BG was 300 and her PCP started her on Glipizide. She continued to have symptoms so she checked her BG on a glucometer said \"high\" so she went to the ED and her BG >600. Her A1C was 13.1%. AB testing showed positive [place] and CRP. At our initial visit in August 2017 I tested her for autoimmune DM   Her insulin, proinsulin and c-peptide were low normal and one of her 4 antibodies were positive. (ZN-8). This fits with Auto-immune DM Type 1/NII. At our last visit in June 2021 her A1C was 6.5%  Pt was diagnosed with breast cancer, in her right breast in October 2020, she opted for bilateral mastectomy. She was taken to the OR by Dr. Tracie Lisa. The surgical pathology found 2 foci of microinvasion in the left breast as well. She is being followed by Dr. Saskia Brink of oncology, who felt that the surgery was complete and did not need any further treatment. It was decided she did not need any chemotherapy or radiation. Pt denies any recent illnesses, injuries or hospitalizations. Pt has received both COVID vaccinations (DIRECTV). She now gets up at 515 every morning and does her Cardio and exercise. She is still taking Basaglar 15 units daily and Novolog 1:15 carb ratio. Her A1C today was 6.8%. She is testing her BGs 4-6 times per day.   Her FBGs run in the 110-130s range, pre-lunch 110-130 and HS in the 90-110s range. She denies issues of hypoglycemia (\"unless I have done a poor job of bolusing and counting carbs, or if I am more active in the evening\"). She notes that \"I don't know how much I am going to eat at the meal so I don't bolus till after I have eaten. \"  \"I am a slow eater so I have to be careful about when I give my insulin. I try to limit my carb intake to make it safer and better. \"    She is an  and she notes this is a source of a lot of stress. Pt notes she does not eat three meals, but tends to \"graze\" during the day. She eats every 2 hours and will cover her carbs as needed. She will have dinner around 6PM.  Last night for dinner she had salad with chicken, bread and diet soda. No history of vascular disease. No history of neuropathy, or nephropathy. Last eye exam was August 2020, no retinopathy. She had a BCC removed on 7/1/19. It required Moh's and \"they had to go a little deeper. Akash Burroughs got it all\". She had follow up in January 2020 and \"there were no concerning spots or anything. \"  She did not need any surgery on the spot on her nose. She sees dermatology every 6 months. \"I have had Moh's surgery 3 times\". She last saw her Dermatologist in February 2021. Current Outpatient Medications   Medication Sig    Accu-Chek Daniela Plus test strp strip CHECK SUGARS 4 TIMES PER DAY    Gauri Pen Needle 32 gauge x 5/32\" ndle 4 shots daily    insulin glargine (Basaglar KwikPen U-100 Insulin) 100 unit/mL (3 mL) inpn 15 units every day. Dispense 5 pens. (Patient taking differently: 15 units every day in AM . Dispense 5 pens.)    NovoLOG Flexpen U-100 Insulin 100 unit/mL (3 mL) inpn Inject 2-10 units up to 3 times daily with higher carb meal.    lancets (BD Ultra Fine Lancets) 33 gauge misc Check blood sugar 4 times per day    IBUPROFEN PO Take 400 mg by mouth as needed.     Blood-Glucose Meter (ACCU-CHEK INDIO PLUS METER) misc Check sugars 4 times per day    acyclovir (ZOVIRAX) 200 mg capsule as needed.  multivitamin (ONE A DAY) tablet Take 1 Tab by mouth daily. No current facility-administered medications for this visit. Allergies   Allergen Reactions    Tree Nut Anaphylaxis     Not true allergy. Peanut sensitivity via allergy testing     Review of Systems:  - Eyes: no blurry vision or double vision  - Cardiovascular: no chest pain  - Respiratory: no shortness of breath  - Musculoskeletal: no myalgias  - Neurological: no numbness/tingling in extremities    Physical Examination:  Blood pressure 134/86, pulse 63, height 5' 5\" (1.651 m), weight 136 lb 3.2 oz (61.8 kg), last menstrual period 10/02/2017.  - General: pleasant, no distress, good eye contact   - Neck: no carotid bruits  - Cardiovascular: regular, normal rate, nl s1 and s2, no m/r/g, 2+ DP pulses   - Respiratory: clear bilaterally  - Integumentary: no edema, no foot ulcers  - Psychiatric: normal mood and affect    Diabetic foot exam:     Left Foot:   Visual Exam: normal    Pulse DP: 2+ (normal)   Filament test: normal sensation    Vibratory sensation: normal      Right Foot:   Visual Exam: normal    Pulse DP: 2+ (normal)   Filament test: normal sensation    Vibratory sensation: normal        Data Reviewed:   Her A1C today was 6.8%. Assessment/Plan:   1) DM > Her A1C today was 6.8%. Pt reports her BG are running in a good range on her current regimen. Pt encouraged to keep up the good work and continue the [email or web address] 15 daily and continue Novolog 1:15 carb ratio. We discussed insulin pump therapy, but she would prefer to continue her current regimen. She is controlling her BGs very well with this regimen and I agree with her. Pt to check her BGs AC/HS (4 times per day) and mail her BG logs to me in 6 weeks. BP has been at goal on no HTN agents. Her TC and LDL had increased at our visit in February 2021.  Pt started working on dietary lifestyle changes and her TC by June 2021 had declined from 205 to 193 and her LDL declined from 101 to 80. Pt voices understanding and agreement with the plan. RTC 6 months. Follow-up and Dispositions    · Return in about 6 months (around 3/16/2022).          Copy sent to:  Dr. Ayden Elaine

## 2021-09-24 RX ORDER — BLOOD SUGAR DIAGNOSTIC
STRIP MISCELLANEOUS
Qty: 400 STRIP | Refills: 3 | Status: SHIPPED | OUTPATIENT
Start: 2021-09-24 | End: 2022-10-17

## 2021-11-15 ENCOUNTER — OFFICE VISIT (OUTPATIENT)
Dept: SURGERY | Age: 56
End: 2021-11-15
Payer: COMMERCIAL

## 2021-11-15 DIAGNOSIS — Z90.13 S/P MASTECTOMY, BILATERAL: ICD-10-CM

## 2021-11-15 DIAGNOSIS — Z85.3 HISTORY OF BREAST CANCER IN FEMALE: ICD-10-CM

## 2021-11-15 DIAGNOSIS — C50.911 DUCTAL CARCINOMA IN SITU (DCIS) OF RIGHT BREAST WITH MICROINVASIVE COMPONENT (HCC): Primary | ICD-10-CM

## 2021-11-15 PROCEDURE — 99213 OFFICE O/P EST LOW 20 MIN: CPT | Performed by: NURSE PRACTITIONER

## 2021-11-15 NOTE — PROGRESS NOTES
HISTORY OF PRESENT ILLNESS  Kar Machado is a 50 y.o. female. HPI Established patient presents for follow-up to RIGHT breast cancer. No breast concerns and no pain.          Breast history -   Referring - [facility]  · 9/28/2020: R breast biopsy- 12:00 O clock- DCIS - ER and LA negative, 10: 00 O clock DCIS  · 10/27/2020: Bilateral skin sparing  mastectomy - Dr. Lia Guthrie and Dr. Dary Eddy   · DCIS and microinvasive breast cancer. 2 negative nodes, ER 10%, LA negative  · T1miN0  · 11/2020 - consult with Dr. Cherry Galarza - no adjuvant therapy        Family history -  Paternal GM had breast cancer, diagnosed late 63's. Maternal aunt, 52's  Maternal aunt, 52's. OB History    No obstetric history on file. Obstetric Comments   Menarche:  15. LMP: 2017. # of Children:  2. Age at Delivery of First Child:  36.   Hysterectomy/oophorectomy:  NO/NO. Breast Bx:  No prior. Hx of Breast Feeding:  yes. BCP:  yes. Hormone therapy:  no.                    Past Surgical History:   Procedure Laterality Date    HX APPENDECTOMY      HX BILATERAL MASTECTOMY  10/2020    HX BREAST RECONSTRUCTION Bilateral 10/27/2020    BILATERAL BREAST RECONSTRUCTION WITH GEL IMPLANTS performed by Silviano Barrientos MD at [facility]  [id number]    HX COLONOSCOPY      HX MASTECTOMY Bilateral 10/27/2020    BILATERAL SKIN & NIPPLE SPARING MASTECTOMIES performed by Ailyn Thompson MD at [address] HX OTHER SURGICAL      MOHS PROCEDURE X 3    IMPLANT BREAST SILICONE/EQ Bilateral     2008    LA BREAST SURGERY PROCEDURE UNLISTED  2008    BREAST AUGMENTATION         ROS    Physical Exam  Constitutional:       Appearance: Normal appearance. Chest:   Breasts:      Right: Absent. No axillary adenopathy or supraclavicular adenopathy. Left: Absent. No axillary adenopathy or supraclavicular adenopathy.         Comments: Chest wall s/p bilateral mastectomy with implant reconstruction  Musculoskeletal: Comments: FROM - UE x 2   Lymphadenopathy:      Upper Body:      Right upper body: No supraclavicular or axillary adenopathy. Left upper body: No supraclavicular or axillary adenopathy. Neurological:      Mental Status: She is alert. Psychiatric:         Attention and Perception: Attention normal.         Mood and Affect: Mood normal.         Speech: Speech normal.         Behavior: Behavior normal.         ASSESSMENT and PLAN  Encounter Diagnoses   Name Primary? 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 Reviewed normal post treatment findings and s/sx of recurrence. Continues care with Dr. Dorisann Canavan - next appointment in 2/2022 to evaluate if fat grafting is needed. She is not leaning towards having this. RTC in 1 year or sooner PRN. She is comfortable with this plan. All questions answered and she stated understanding. Total time spent for this patient - 20 minutes.

## 2022-02-20 RX ORDER — INSULIN GLARGINE 100 [IU]/ML
INJECTION, SOLUTION SUBCUTANEOUS
Qty: 30 ML | Refills: 3 | Status: SHIPPED | OUTPATIENT
Start: 2022-02-20

## 2022-02-20 RX ORDER — INSULIN ASPART 100 [IU]/ML
INJECTION, SOLUTION INTRAVENOUS; SUBCUTANEOUS
Qty: 30 ML | Refills: 6 | Status: SHIPPED | OUTPATIENT
Start: 2022-02-20

## 2022-03-01 DIAGNOSIS — E10.9 TYPE 1 DIABETES MELLITUS WITHOUT COMPLICATION (HCC): ICD-10-CM

## 2022-03-18 PROBLEM — Z17.0 MALIGNANT NEOPLASM OF RIGHT BREAST IN FEMALE, ESTROGEN RECEPTOR POSITIVE (HCC): Status: ACTIVE | Noted: 2020-11-19

## 2022-03-18 PROBLEM — C50.911 MALIGNANT NEOPLASM OF RIGHT BREAST IN FEMALE, ESTROGEN RECEPTOR POSITIVE (HCC): Status: ACTIVE | Noted: 2020-11-19

## 2022-03-18 PROBLEM — C50.911 DUCTAL CARCINOMA IN SITU (DCIS) OF RIGHT BREAST WITH MICROINVASIVE COMPONENT (HCC): Status: ACTIVE | Noted: 2020-11-10

## 2022-03-19 PROBLEM — E10.9 TYPE 1 DIABETES MELLITUS WITHOUT COMPLICATION (HCC): Status: ACTIVE | Noted: 2017-10-23

## 2022-03-19 PROBLEM — Z90.13 S/P MASTECTOMY, BILATERAL: Status: ACTIVE | Noted: 2020-10-27

## 2022-03-23 ENCOUNTER — OFFICE VISIT (OUTPATIENT)
Dept: PRIMARY CARE CLINIC | Age: 57
End: 2022-03-23

## 2022-03-23 VITALS
WEIGHT: 137.2 LBS | HEART RATE: 63 BPM | HEIGHT: 65 IN | SYSTOLIC BLOOD PRESSURE: 126 MMHG | TEMPERATURE: 97.7 F | RESPIRATION RATE: 18 BRPM | OXYGEN SATURATION: 98 % | DIASTOLIC BLOOD PRESSURE: 82 MMHG | BODY MASS INDEX: 22.86 KG/M2

## 2022-03-23 DIAGNOSIS — J01.10 ACUTE NON-RECURRENT FRONTAL SINUSITIS: Primary | ICD-10-CM

## 2022-03-23 DIAGNOSIS — J30.1 SEASONAL ALLERGIC RHINITIS DUE TO POLLEN: ICD-10-CM

## 2022-03-23 LAB — SARS-COV-2 PCR, POC: NEGATIVE

## 2022-03-23 PROCEDURE — 87635 SARS-COV-2 COVID-19 AMP PRB: CPT | Performed by: NURSE PRACTITIONER

## 2022-03-23 PROCEDURE — 99213 OFFICE O/P EST LOW 20 MIN: CPT | Performed by: NURSE PRACTITIONER

## 2022-03-23 RX ORDER — INSULIN GLARGINE 100 [IU]/ML
INJECTION, SOLUTION SUBCUTANEOUS
COMMUNITY
End: 2022-08-16 | Stop reason: SDUPTHER

## 2022-03-23 RX ORDER — AMOXICILLIN AND CLAVULANATE POTASSIUM 875; 125 MG/1; MG/1
1 TABLET, FILM COATED ORAL EVERY 12 HOURS
Qty: 14 TABLET | Refills: 0 | Status: SHIPPED | OUTPATIENT
Start: 2022-03-23 | End: 2022-03-30

## 2022-03-23 RX ORDER — MINERAL OIL
ENEMA (ML) RECTAL
COMMUNITY

## 2022-03-23 NOTE — PROGRESS NOTES
Subjective:   Loly Vallejo presents for evaluation of Cold Symptoms (Pt feels that she has a sinus infection. This started Monday morning, congestion, runny nose, chest pressure. Has tried OTC medications with no relief. )     This started 2 days ago, and is rapidly worsening since that time. She also reports fatigue, bilateral sinus pain, sinus congestion, rhinorrhea, post nasal drip and productive cough. She denies a history of: fever. Treatments have included: decongestants, antihistamines, cough suppressants, nasal steroids, OTC products. Relevant PMH: recurrent sinusitis and allergic rhinitis. Patient reports sick contacts: no.  Fatigued but unable to sleep at night due to severity of symptoms. Home Covid tests x 2 - negative. /82   Pulse 63   Temp 97.7 °F (36.5 °C)   Resp 18   Ht 5' 5\" (1.651 m)   Wt 137 lb 3.2 oz (62.2 kg)   LMP 10/02/2017 (Within Weeks)   SpO2 98%   BMI 22.83 kg/m²     Physical Exam  General: alert, cooperative, no distress, appears stated age  Eye exam: negative  Ear exam: abnormal TM AD - erythematous, serous middle ear fluid  Sinus exam: tenderness over bilateral frontal  Oropharynx exam: Lips, mucosa, and tongue normal. Teeth and gums normal and abnormal findings: mild oropharyngeal erythema  Neck exam: supple, symmetrical, trachea midline and mild anterior cervical adenopathy  Heart exam: normal rate, regular rhythm, normal S1, S2, no murmurs, rubs, clicks or gallops  Lung exam: clear to auscultation bilaterally      Results for orders placed or performed in visit on 03/23/22   POCT COVID-19, SARS-COV-2, PCR   Result Value Ref Range    SARS-COV-2 PCR, POC Negative Negative       Assessment/Plan:   1.  Acute non-recurrent frontal sinusitis  -     POCT COVID-19, SARS-COV-2, PCR  2. Seasonal allergic rhinitis due to pollen    Orders Placed This Encounter    POCT COVID-19, SARS-COV-2, PCR    insulin glargine (Basaglar KwikPen U-100 Insulin) 100 unit/mL (3 mL) inpn    fexofenadine (ALLEGRA) 180 mg tablet    amoxicillin-clavulanate (AUGMENTIN) 875-125 mg per tablet     The above diagnosis is a new problem. We discussed expected course, resolution, and complications of diagnosis in detail. No follow-ups on file. An electronic signature was used to authenticate this note.   -- Sixto Soliz, NP

## 2022-03-23 NOTE — PROGRESS NOTES
Chief Complaint   Patient presents with    Cold Symptoms     Pt feels that she has a sinus infection. This started Monday morning, congestion, runny nose, chest pressure. Has tried OTC medications with no relief.       Visit Vitals  /82   Pulse 63   Temp 97.7 °F (36.5 °C)   Resp 18   Ht 5' 5\" (1.651 m)   Wt 137 lb 3.2 oz (62.2 kg)   SpO2 98%   BMI 22.83 kg/m²

## 2022-03-23 NOTE — PATIENT INSTRUCTIONS
Sinusitis: Care Instructions  Your Care Instructions     Sinusitis is an infection of the lining of the sinus cavities in your head. Sinusitis often follows a cold. It causes pain and pressure in your head and face. In most cases, sinusitis gets better on its own in 1 to 2 weeks. But some mild symptoms may last for several weeks. Sometimes antibiotics are needed. Follow-up care is a key part of your treatment and safety. Be sure to make and go to all appointments, and call your doctor if you are having problems. It's also a good idea to know your test results and keep a list of the medicines you take. How can you care for yourself at home? · Take an over-the-counter pain medicine, such as acetaminophen (Tylenol), ibuprofen (Advil, Motrin), or naproxen (Aleve). Read and follow all instructions on the label. · If the doctor prescribed antibiotics, take them as directed. Do not stop taking them just because you feel better. You need to take the full course of antibiotics. · Be careful when taking over-the-counter cold or flu medicines and Tylenol at the same time. Many of these medicines have acetaminophen, which is Tylenol. Read the labels to make sure that you are not taking more than the recommended dose. Too much acetaminophen (Tylenol) can be harmful. · Breathe warm, moist air from a steamy shower, a hot bath, or a sink filled with hot water. Avoid cold, dry air. Using a humidifier in your home may help. Follow the directions for cleaning the machine. · Use saline (saltwater) nasal washes. This can help keep your nasal passages open and wash out mucus and bacteria. You can buy saline nose drops at a grocery store or drugstore. Or you can make your own at home by adding 1 teaspoon of salt and 1 teaspoon of baking soda to 2 cups of distilled water. If you make your own, fill a bulb syringe with the solution, insert the tip into your nostril, and squeeze gently. Jose Jacks your nose.   · Put a hot, wet towel or a warm gel pack on your face 3 or 4 times a day for 5 to 10 minutes each time. · Try a decongestant nasal spray like oxymetazoline (Afrin). Do not use it for more than 3 days in a row. Using it for more than 3 days can make your congestion worse. When should you call for help? Call your doctor now or seek immediate medical care if:    · You have new or worse swelling or redness in your face or around your eyes.     · You have a new or higher fever. Watch closely for changes in your health, and be sure to contact your doctor if:    · You have new or worse facial pain.     · The mucus from your nose becomes thicker (like pus) or has new blood in it.     · You are not getting better as expected. Where can you learn more? Go to http://www.gray.com/  Enter U044 in the search box to learn more about \"Sinusitis: Care Instructions. \"  Current as of: September 8, 2021               Content Version: 13.2  © 2006-2022 [employer]. Care instructions adapted under license by [employer] (which disclaims liability or warranty for this information). If you have questions about a medical condition or this instruction, always ask your healthcare professional. [place] [address] any warranty or liability for your use of this information.

## 2022-08-03 ENCOUNTER — OFFICE VISIT (OUTPATIENT)
Dept: PRIMARY CARE CLINIC | Age: 57
End: 2022-08-03

## 2022-08-03 DIAGNOSIS — U07.1 COVID-19: Primary | ICD-10-CM

## 2022-08-03 LAB — SARS-COV-2 PCR, POC: POSITIVE

## 2022-08-03 PROCEDURE — 99441 PR PHYS/QHP TELEPHONE EVALUATION 5-10 MIN: CPT | Performed by: NURSE PRACTITIONER

## 2022-08-03 PROCEDURE — 87635 SARS-COV-2 COVID-19 AMP PRB: CPT | Performed by: NURSE PRACTITIONER

## 2022-08-03 NOTE — PROGRESS NOTES
Kristina Barreto is a 49 y.o. female, evaluated via audio-only technology on 8/3/2022 for Concern For COVID-19 (Coronavirus)    Presents to office building parking lot for [employer] testing. Pt c/o fever, cough, SOB, fatigue, myalgias, headache, congestion/runny nose for 2 days. No known sick contacts. Has had Covid vaccine. Past Medical History:   Diagnosis Date    Cancer ([facility] [address] 09/2020    RIGHT BREAST CA    Diabetes ([facility] [address] 07/2017    Type 1 diabetes    Menopause      Past Surgical History:   Procedure Laterality Date    HX APPENDECTOMY      HX BILATERAL MASTECTOMY  10/2020    HX BREAST RECONSTRUCTION Bilateral 10/27/2020    BILATERAL BREAST RECONSTRUCTION WITH GEL IMPLANTS performed by Flex Castro MD at [facility] 630  [phone]    HX COLONOSCOPY      HX MASTECTOMY Bilateral 10/27/2020    BILATERAL SKIN & NIPPLE SPARING MASTECTOMIES performed by Tracie Rodriguez MD at [address]    IMPLANT BREAST SILICONE/EQ Bilateral     2008    MI BREAST SURGERY PROCEDURE UNLISTED  2008    BREAST AUGMENTATION     Allergies   Allergen Reactions    Tree Nut Anaphylaxis     Not true allergy. Peanut sensitivity via allergy testing     Results for orders placed or performed in visit on 08/03/22   POCT COVID-19, SARS-COV-2, PCR   Result Value Ref Range    SARS-COV-2 PCR, POC Positive (A) Negative        Assessment & Plan:   Diagnoses and all orders for this visit:    1. COVID-19  -     POCT COVID-19, SARS-COV-2, PCR      The complexity of medical decision making for this visit is  low      12  Subjective:       Prior to Admission medications    Medication Sig Start Date End Date Taking? Authorizing Provider   insulin glargine (Basaglar KwikPen U-100 Insulin) 100 unit/mL (3 mL) inpn Basaglar KwikPen U-100 Insulin 100 unit/mL (3 mL) subcutaneous   INJECT 15 UNITS EVERY DAY.   Patient not taking: Reported on 3/23/2022    Provider, Historical fexofenadine (ALLEGRA) 180 mg tablet Take  by mouth. Provider, Historical   NovoLOG Flexpen U-100 Insulin 100 unit/mL (3 mL) inpn INJECT 2-10 UNITS UP TO 3 TIMES DAILY WITH HIGHER CARB MEAL. 2/20/22   Sheila Jordan MD   insulin glargine (Basaglar KwikPen U-100 Insulin) 100 unit/mL (3 mL) inpn INJECT 15 UNITS EVERY DAY. 2/20/22   Sheila Jordan MD   Accu-Chek Daniela Plus test strp strip CHECK SUGARS 4 TIMES PER DAY 9/24/21   Sheila Jordan MD   Gauri Pen Needle 32 gauge x 5/32\" ndle 4 shots daily 7/1/21   Pete Skelton MD   lancets (BD Ultra Fine Lancets) 33 gauge misc Check blood sugar 4 times per day 8/20/20   Sheila Jordan MD   IBUPROFEN PO Take 400 mg by mouth as needed. Provider, Historical   Blood-Glucose Meter (ACCU-CHEK DANIELA PLUS METER) misc Check sugars 4 times per day 2/21/19   Sheila Jordan MD   acyclovir (ZOVIRAX) 200 mg capsule as needed. Provider, Historical   multivitamin (ONE A DAY) tablet Take 1 Tab by mouth daily. Provider, Historical     Allergies   Allergen Reactions    Tree Nut Anaphylaxis     Not true allergy.  Peanut sensitivity via allergy testing     Past Medical History:   Diagnosis Date    Cancer ([facility] [address] 09/2020    RIGHT BREAST CA    Diabetes ([facility] [address] 07/2017    Type 1 diabetes    Menopause      Past Surgical History:   Procedure Laterality Date    HX APPENDECTOMY      HX BILATERAL MASTECTOMY  10/2020    HX BREAST RECONSTRUCTION Bilateral 10/27/2020    BILATERAL BREAST RECONSTRUCTION WITH GEL IMPLANTS performed by Sameer Washington MD at [facility] 630  [phone]    HX COLONOSCOPY      HX MASTECTOMY Bilateral 10/27/2020    BILATERAL SKIN & NIPPLE SPARING MASTECTOMIES performed by Shankar Silva MD at [facility] [address] X 3    IMPLANT BREAST SILICONE/EQ Bilateral     2008    GA BREAST SURGERY PROCEDURE UNLISTED  2008    BREAST AUGMENTATION       ROS    No data recorded       The patient was called for notification of a POSITIVE test result for COVID-19. The following information was given to the patient:    The COVID-19 test result was positive  Mild and stable symptoms are managed at home    Treatment of coronavirus does not require an antibiotic  Remain isolated for 10 days since symptoms first appeared AND at least 3 days have passed after recovery    Recovery is defined as resolution of fever without the use of fever-reducing medications with progressive improvement or resolution of other symptoms    Wash hands often with soap and water for at least 20 seconds or alternatively use hand  with at least 60% alcohol content  Cover coughs and sneezes  Wear a mask when around others if possible  Clean all high-touch surfaces every day, such as doorknobs and cellphones  Continually monitor symptoms. Contact your medical provider if symptoms are worsening, such as difficulty breathing  For more information visit the [facility] website: [email or web address]      Recommend pt follow employer's policy regarding RTW. Recommend pt call PCP regarding diagnosis and treatment options. Kristina Barreto was evaluated through a patient-initiated, synchronous (real-time) audio only encounter. She (or guardian if applicable) is aware that it is a billable service, which includes applicable co-pays, with coverage as determined by her insurance carrier. This visit was conducted with the patient's (and/or Azam Chua guardian's) verbal consent. She has not had a related appointment within my department in the past 7 days or scheduled within the next 24 hours. The patient was located in a state where the provider was licensed to provide care. The patient was located at: Facility (Appt Department): [address] [place] [address] [facility]  [address] [facility]  The provider was located at:  Facility (Appt Department): [address] [place] [address] [facility] [address]  [address] [facility]    Total Time: minutes: 5-10 minutes    Michelle Sic, NP

## 2022-08-11 LAB
ALBUMIN SERPL-MCNC: 4.3 G/DL (ref 3.8–4.9)
ALBUMIN/CREAT UR: <10 MG/G CREAT (ref 0–29)
ALBUMIN/GLOB SERPL: 1.8 {RATIO} (ref 1.2–2.2)
ALP SERPL-CCNC: 66 IU/L (ref 44–121)
ALT SERPL-CCNC: 10 IU/L (ref 0–32)
AST SERPL-CCNC: 17 IU/L (ref 0–40)
BILIRUB SERPL-MCNC: 0.6 MG/DL (ref 0–1.2)
BUN SERPL-MCNC: 7 MG/DL (ref 6–24)
BUN/CREAT SERPL: 10 (ref 9–23)
CALCIUM SERPL-MCNC: 9.2 MG/DL (ref 8.7–10.2)
CHLORIDE SERPL-SCNC: 94 MMOL/L (ref 96–106)
CHOLEST SERPL-MCNC: 173 MG/DL (ref 100–199)
CO2 SERPL-SCNC: 25 MMOL/L (ref 20–29)
CREAT SERPL-MCNC: 0.69 MG/DL (ref 0.57–1)
CREAT UR-MCNC: 29 MG/DL
EGFR: 103 ML/MIN/1.73
EST. AVERAGE GLUCOSE BLD GHB EST-MCNC: 151 MG/DL
GLOBULIN SER CALC-MCNC: 2.4 G/DL (ref 1.5–4.5)
GLUCOSE SERPL-MCNC: 96 MG/DL (ref 65–99)
HBA1C MFR BLD: 6.9 % (ref 4.8–5.6)
HDLC SERPL-MCNC: 76 MG/DL
IMP & REVIEW OF LAB RESULTS: NORMAL
LDLC SERPL CALC-MCNC: 87 MG/DL (ref 0–99)
MICROALBUMIN UR-MCNC: <3 UG/ML
POTASSIUM SERPL-SCNC: 5.2 MMOL/L (ref 3.5–5.2)
PROT SERPL-MCNC: 6.7 G/DL (ref 6–8.5)
SODIUM SERPL-SCNC: 132 MMOL/L (ref 134–144)
TRIGL SERPL-MCNC: 46 MG/DL (ref 0–149)
TSH SERPL DL<=0.005 MIU/L-ACNC: 1.86 UIU/ML (ref 0.45–4.5)
VLDLC SERPL CALC-MCNC: 10 MG/DL (ref 5–40)

## 2022-08-16 ENCOUNTER — OFFICE VISIT (OUTPATIENT)
Dept: ENDOCRINOLOGY | Age: 57
End: 2022-08-16
Payer: COMMERCIAL

## 2022-08-16 VITALS
SYSTOLIC BLOOD PRESSURE: 123 MMHG | WEIGHT: 133.2 LBS | HEART RATE: 67 BPM | HEIGHT: 65 IN | DIASTOLIC BLOOD PRESSURE: 75 MMHG | BODY MASS INDEX: 22.19 KG/M2

## 2022-08-16 DIAGNOSIS — E10.9 TYPE 1 DIABETES MELLITUS WITHOUT COMPLICATION (HCC): Primary | ICD-10-CM

## 2022-08-16 PROCEDURE — 99214 OFFICE O/P EST MOD 30 MIN: CPT | Performed by: INTERNAL MEDICINE

## 2022-08-16 PROCEDURE — 3044F HG A1C LEVEL LT 7.0%: CPT | Performed by: INTERNAL MEDICINE

## 2022-08-16 NOTE — LETTER
8/16/2022    Patient: Claire Hanley   YOB: 1965   Date of Visit: 8/16/2022     Marizol Malone MD  [address] [place] [address] [address]  Via Fax: [phone]    Dear Marizol Malone MD,      Thank you for referring Ms. Yeny Goldstein to [facility] for evaluation. My notes for this consultation are attached. If you have questions, please do not hesitate to call me. I look forward to following your patient along with you.       Sincerely,    Mary Alonso MD

## 2022-08-16 NOTE — PROGRESS NOTES
Chief Complaint   Patient presents with    Diabetes     Pcp and pharmacy verified   Records since last visit reviewed     History of Present Illness: Anil Garner is a 49 y.o. female here for follow up of diabetes. Pt notes that over [facility] day weekend (2017)  she \"came down with a Virus and sinus infection\" She was put on Abx and she noted polyuria, polydipsia and blurred vision. She had also lost 10 pounds in a short period of time. She saw her eye doctor who noted swelling in her cornia (will request these records). The swelling improved but did not normalize, her eye doctor recommended she get labs drawn and her PCP cecilia labs, her BG was 300 and her PCP started her on Glipizide. She continued to have symptoms so she checked her BG on a glucometer said \"high\" so she went to the ED and her BG >600. Her A1C was 13.1%. AB testing showed positive [place] and CRP. At our initial visit in August 2017 I tested her for autoimmune DM   Her insulin, proinsulin and c-peptide were low normal and one of her 4 antibodies were positive. (ZN-8). This fits with Auto-immune DM Type 1/NII. \"I have been sick most of the summer. I have had sinus infections and bronchitis. Then on August 1st I tested positive for COVID. During these times my sugars have been higher. I am now 5 days out from testing negative, and I still have some sinus congestion and cough, though I do fell better than I had. \"    Pt notes she has been trying to eat more fresh fruits and vegetables to lower her lipid panel and she notes that this has caused her BGs to run a little higher. She is testing her BGs 4 times per day. Her FBGs are running in the 100-130s (\"when I was sick I was going to bed in the 130s and waking up in the 300s\"). Her BGs in the afternoon are running in the 130-150s, her evening BGs are running in  the 130-150s.   She notes she was having some low BGs while she was sick, but she is not typically having issues of hypoglycemia. She is still taking Basaglar 15 units daily and Novolog 1:15 carb ratio (2-6 units per meal). Pt was diagnosed with breast cancer, in her right breast in October 2020, she opted for bilateral mastectomy. She was taken to the OR by Dr. Yomaira De La Garza. The surgical pathology found 2 foci of microinvasion in the left breast as well. She is being followed by Dr. Richie Guajardo of oncology, who felt that the surgery was complete and did not need any further treatment. It was decided she did not need any chemotherapy or radiation. Pt is still getting up and going to work out in the morning most every day, around 4-498TP.    She notes that \"I don't know how much I am going to eat at the meal so I don't bolus till after I have eaten. \"  \"I am a slow eater so I have to be careful about when I give my insulin. I try to limit my carb intake to make it safer and better. \"    She is an  and she notes this is a source of a lot of stress. Pt notes she does not eat three meals, but tends to \"graze\" during the day. She eats every 2 hours and will cover her carbs as needed. She will have dinner around 6PM.  Last night for dinner she had salad with chicken, bread and diet soda. No history of vascular disease. No history of neuropathy, or nephropathy. Last eye exam was December 2021, no retinopathy. She had a BCC removed on 7/1/19. It required Moh's and \"they had to go a little deeper. Naima Mead got it all\". She had follow up in January 2020 and \"there were no concerning spots or anything. \"  She did not need any surgery on the spot on her nose. She sees dermatology every 6 months. \"I have had Moh's surgery 3 times\". She last saw her Dermatologist in May 2022. Current Outpatient Medications   Medication Sig    fexofenadine (ALLEGRA) 180 mg tablet Take  by mouth.     NovoLOG Flexpen U-100 Insulin 100 unit/mL (3 mL) inpn INJECT 2-10 UNITS UP TO 3 TIMES DAILY WITH HIGHER CARB MEAL. insulin glargine (Basaglar KwikPen U-100 Insulin) 100 unit/mL (3 mL) inpn INJECT 15 UNITS EVERY DAY. Accu-Chek Indio Plus test strp strip CHECK SUGARS 4 TIMES PER DAY    Gauri Pen Needle 32 gauge x 5/32\" ndle 4 shots daily    lancets (BD Ultra Fine Lancets) 33 gauge misc Check blood sugar 4 times per day    IBUPROFEN PO Take 400 mg by mouth as needed. Blood-Glucose Meter (ACCU-CHEK INDIO PLUS METER) misc Check sugars 4 times per day    acyclovir (ZOVIRAX) 200 mg capsule as needed. multivitamin (ONE A DAY) tablet Take 1 Tab by mouth daily. No current facility-administered medications for this visit. Allergies   Allergen Reactions    Tree Nut Anaphylaxis     Not true allergy. Peanut sensitivity via allergy testing     Review of Systems:  - Eyes: no blurry vision or double vision  - Cardiovascular: no chest pain  - Respiratory: no shortness of breath  - Musculoskeletal: no myalgias  - Neurological: no numbness/tingling in extremities    Physical Examination:  Blood pressure 123/75, pulse 67, height 5' 5\" (1.651 m), weight 133 lb 3.2 oz (60.4 kg), last menstrual period 10/02/2017.   General: pleasant, no distress, good eye contact   Neck: no carotid bruits  Cardiovascular: regular, normal rate, nl s1 and s2, no m/r/g, 2+ DP pulses   Respiratory: clear bilaterally  Integumentary: no edema, no foot ulcers  Psychiatric: normal mood and affect    Diabetic foot exam:     Left Foot:   Visual Exam: normal    Pulse DP: 2+ (normal)   Filament test: normal sensation    Vibratory sensation: normal      Right Foot:   Visual Exam: normal    Pulse DP: 2+ (normal)   Filament test: normal sensation    Vibratory sensation: normal        Data Reviewed:   Component      Latest Ref Rng & Units 8/10/2022   Glucose      65 - 99 mg/dL 96   BUN      6 - 24 mg/dL 7   Creatinine      0.57 - 1.00 mg/dL 0.69   eGFR      >59 mL/min/1.73 103   BUN/Creatinine ratio      9 - 23 10   Sodium      134 - 144 mmol/L 132 (L) Potassium      3.5 - 5.2 mmol/L 5.2   Chloride      96 - 106 mmol/L 94 (L)   CO2      20 - 29 mmol/L 25   Calcium      8.7 - 10.2 mg/dL 9.2   Protein, total      6.0 - 8.5 g/dL 6.7   Albumin      3.8 - 4.9 g/dL 4.3   GLOBULIN, TOTAL      1.5 - 4.5 g/dL 2.4   A-G Ratio      1.2 - 2.2 1.8   Bilirubin, total      0.0 - 1.2 mg/dL 0.6   Alk. phosphatase      44 - 121 IU/L 66   AST      0 - 40 IU/L 17   ALT      0 - 32 IU/L 10   Cholesterol, total      100 - 199 mg/dL 173   Triglyceride      0 - 149 mg/dL 46   HDL Cholesterol      >39 mg/dL 76   VLDL, calculated      5 - 40 mg/dL 10   LDL, calculated      0 - 99 mg/dL 87   Creatinine, urine      Not Estab. mg/dL 29.0   Microalbumin, urine      Not Estab. ug/mL <3.0   Microalbumin/Creat. Ratio      0 - 29 mg/g creat <10   Hemoglobin A1c, (calculated)      4.8 - 5.6 % 6.9 (H)   Estimated average glucose      mg/dL 151   TSH      0.450 - 4.500 uIU/mL 1.860       Assessment/Plan:   1) DM > Her A1C last week was 6.9%. Pt reports her BG were higher over the summer, while she was sick. She is still \"grazing\" during the day and correcting when her BGs run high. We discussed CGMs and since she is a Type 1 diabetic, is on 4-5 injections per day and adjusts her insulin dose based on her BG readings she would benefit from CGM. I will order a FreeStyle Yudy 2 for pt to try. Pt to continue the Basaglar 15 daily and continue Novolog 1:15 carb ratio. We discussed insulin pump therapy, but she would prefer to continue her current regimen. She is controlling her BGs very well with this regimen and I agree with her. Pt to check her BGs AC/HS (4 times per day) and mail her BG logs to me in 6 weeks. BP has been at goal on no HTN agents. Her TC and LDL had increased at our visit in February 2021. Pt started working on dietary lifestyle changes and her TC by June 2021 had declined from 205 to 193 and her LDL declined from 101 to 80.  Her TC last week was 173 with LDL 87.  Pt does not need to be started on any lipid lowering agents. Pt voices understanding and agreement with the plan. RTC 4 months.              Copy sent to:  Dr. Esvin Flores

## 2022-10-17 RX ORDER — BLOOD SUGAR DIAGNOSTIC
STRIP MISCELLANEOUS
Qty: 400 STRIP | Refills: 0 | Status: SHIPPED | OUTPATIENT
Start: 2022-10-17

## 2022-11-14 ENCOUNTER — OFFICE VISIT (OUTPATIENT)
Dept: SURGERY | Age: 57
End: 2022-11-14
Payer: COMMERCIAL

## 2022-11-14 VITALS — WEIGHT: 133 LBS | HEIGHT: 65 IN | BODY MASS INDEX: 22.16 KG/M2

## 2022-11-14 DIAGNOSIS — C50.911 DUCTAL CARCINOMA IN SITU (DCIS) OF RIGHT BREAST WITH MICROINVASIVE COMPONENT (HCC): Primary | ICD-10-CM

## 2022-11-14 DIAGNOSIS — Z85.3 HISTORY OF BREAST CANCER IN FEMALE: ICD-10-CM

## 2022-11-14 DIAGNOSIS — Z90.13 S/P MASTECTOMY, BILATERAL: ICD-10-CM

## 2022-11-14 PROCEDURE — 99213 OFFICE O/P EST LOW 20 MIN: CPT | Performed by: NURSE PRACTITIONER

## 2022-11-14 NOTE — PROGRESS NOTES
HISTORY OF PRESENT ILLNESS  Cata Marshall is a 49 y.o. female. HPI Established patient presents for follow-up to RIGHT breast cancer. No breast concerns and no pain. Breast history -   Referring - [facility]  9/28/2020: R breast biopsy- 12:00 O clock- DCIS - ER and OK negative, 10: 00 O clock DCIS  10/27/2020: Bilateral skin sparing  mastectomy - Dr. Maria R Broderick and Dr. Lindsay White   DCIS and microinvasive breast cancer. 2 negative nodes, ER 10%, OK negative  T1miN0  11/2020 - consult with Dr. Lonnie Rasmussen - no adjuvant therapy        Family history -  Paternal GM had breast cancer, diagnosed late 63's. Maternal aunt, 52's  Maternal aunt, 52's      OB History    No obstetric history on file. Obstetric Comments   Menarche:  15. LMP: 2017. # of Children:  2. Age at Delivery of First Child:  36.   Hysterectomy/oophorectomy:  NO/NO. Breast Bx:  No prior. Hx of Breast Feeding:  yes. BCP:  yes. Hormone therapy:  no.                      Past Surgical History:   Procedure Laterality Date    HX APPENDECTOMY      HX BILATERAL MASTECTOMY  10/2020    HX BREAST RECONSTRUCTION Bilateral 10/27/2020    BILATERAL BREAST RECONSTRUCTION WITH GEL IMPLANTS performed by Marimar Lisa MD at [facility] 630  [phone]    HX COLONOSCOPY      HX MASTECTOMY Bilateral 10/27/2020    BILATERAL SKIN & NIPPLE SPARING MASTECTOMIES performed by Toney Rubalcava MD at [address] [facility] E X 3    IMPLANT BREAST SILICONE/EQ Bilateral     2008    OK BREAST SURGERY PROCEDURE UNLISTED  2008    BREAST AUGMENTATION         ROS    Physical Exam  Constitutional:       Appearance: Normal appearance. Chest:   Breasts:     Right: Absent. Left: Absent. Comments: Chest wall s/p BILATERAL mastectomy with implant reconstruction  Musculoskeletal:      Comments: FROM - UE x 2   Lymphadenopathy:      Upper Body:      Right upper body: No supraclavicular or axillary adenopathy. Left upper body: No supraclavicular or axillary adenopathy. Neurological:      Mental Status: She is alert. Psychiatric:         Attention and Perception: Attention normal.         Mood and Affect: Mood normal.         Speech: Speech normal.         Behavior: Behavior normal.       Visit Vitals  Ht 5' 5\" (1.651 m)   Wt 133 lb (60.3 kg)   LMP 10/02/2017 (Within Weeks)   BMI 22.13 kg/m²       ASSESSMENT and PLAN    ICD-10-CM ICD-9-CM    1. 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 S/P mastectomy, bilateral  Z90.13 V45.71       3. History of breast cancer in female  Z85.3 V10.3           Normal exam with no evidence of local recurrence. Reviewed normal post treatment findings and s/sx of recurrence. RTC in 1 year or sooner PRN. She is comfortable with this plan. All questions answered and she stated understanding. Total time spent for this patient - 20 minutes.

## 2022-11-16 ENCOUNTER — OFFICE VISIT (OUTPATIENT)
Dept: PRIMARY CARE CLINIC | Age: 57
End: 2022-11-16

## 2022-11-16 VITALS
BODY MASS INDEX: 23.36 KG/M2 | SYSTOLIC BLOOD PRESSURE: 122 MMHG | DIASTOLIC BLOOD PRESSURE: 83 MMHG | TEMPERATURE: 98.7 F | HEIGHT: 65 IN | RESPIRATION RATE: 18 BRPM | WEIGHT: 140.2 LBS | OXYGEN SATURATION: 96 % | HEART RATE: 91 BPM

## 2022-11-16 DIAGNOSIS — J06.9 ACUTE URI: Primary | ICD-10-CM

## 2022-11-16 DIAGNOSIS — J20.8 ACUTE BRONCHITIS DUE TO OTHER SPECIFIED ORGANISMS: ICD-10-CM

## 2022-11-16 LAB
FLUAV+FLUBV AG NOSE QL IA.RAPID: NEGATIVE
FLUAV+FLUBV AG NOSE QL IA.RAPID: NEGATIVE
SARS-COV-2 PCR, POC: NEGATIVE
VALID INTERNAL CONTROL?: YES

## 2022-11-16 RX ORDER — AZITHROMYCIN 250 MG/1
250 TABLET, FILM COATED ORAL SEE ADMIN INSTRUCTIONS
Qty: 6 TABLET | Refills: 0 | Status: SHIPPED | OUTPATIENT
Start: 2022-11-16 | End: 2022-11-21

## 2022-11-16 RX ORDER — PROMETHAZINE HYDROCHLORIDE AND DEXTROMETHORPHAN HYDROBROMIDE 6.25; 15 MG/5ML; MG/5ML
5 SYRUP ORAL
Qty: 120 ML | Refills: 0 | Status: CANCELLED | OUTPATIENT
Start: 2022-11-16 | End: 2022-11-23

## 2022-11-16 RX ORDER — PROMETHAZINE HYDROCHLORIDE AND DEXTROMETHORPHAN HYDROBROMIDE 6.25; 15 MG/5ML; MG/5ML
5 SYRUP ORAL
Qty: 120 ML | Refills: 0 | Status: SHIPPED | OUTPATIENT
Start: 2022-11-16 | End: 2022-11-23

## 2022-11-16 RX ORDER — PREDNISONE 10 MG/1
10 TABLET ORAL SEE ADMIN INSTRUCTIONS
Qty: 21 TABLET | Refills: 0 | Status: SHIPPED | OUTPATIENT
Start: 2022-11-16

## 2022-11-16 NOTE — PROGRESS NOTES
Identified pt with two pt identifiers(name and ). Reviewed record in preparation for visit and have obtained necessary documentation. Chief Complaint   Patient presents with    Cold Symptoms     Started  night; cough(dry)/headache/fatigue/sinus pressure and pain; no covid testing; taking robitussin cold and flu, zicam, and nasacort; using daughters albuterol inhaler bid      Vitals:    22 1613   BP: 122/83   Pulse: 91   Resp: 18   Temp: 98.7 °F (37.1 °C)   TempSrc: Temporal   SpO2: 96%   Weight: 140 lb 3.2 oz (63.6 kg)   Height: 5' 5\" (1.651 m)   PainSc:   0 - No pain   LMP: 10/02/2017       Health Maintenance Review: Patient reminded of \"due or due soon\" health maintenance. I have asked the patient to contact his/her primary care provider (PCP) for follow-up on his/her health maintenance. Coordination of Care Questionnaire:  :   1) Have you been to an emergency room, urgent care, or hospitalized since your last visit? If yes, where when, and reason for visit? no       2. Have seen or consulted any other health care provider since your last visit? If yes, where when, and reason for visit? NO      Patient is accompanied by self I have received verbal consent from Sarah Krueger to discuss any/all medical information while they are present in the room.

## 2022-11-16 NOTE — PROGRESS NOTES
Sky Salter ( 1965) is a 49 y.o. female, established patient, here for evaluation of the following chief complaint(s):  Cold Symptoms (Started Sunday night; cough(dry)/headache/fatigue/sinus pressure and pain; no covid testing; taking robitussin cold and flu, zicam, and nasacort; using daughters albuterol inhaler bid)       ASSESSMENT/PLAN:  Diagnoses and all orders for this visit:    1. Acute URI  -     AMB POC WAGNER INFLUENZA A/B TEST  -     POCT COVID-19, SARS-COV-2, PCR  -     azithromycin (ZITHROMAX) 250 mg tablet; Take 1 Tablet by mouth See Admin Instructions for 5 days. -     predniSONE (STERAPRED DS) 10 mg dose pack; Take 1 Tablet by mouth See Admin Instructions. See administration instruction per 10mg dose pack    2. Acute bronchitis due to other specified organisms    Other orders  -     promethazine-dextromethorphan (PROMETHAZINE-DM) 6.25-15 mg/5 mL syrup; Take 5 mL by mouth every four (4) hours as needed for Cough for up to 7 days. Return in about 1 week (around 11/23/2022), or if symptoms worsen or fail to improve, for URI. Cold Symptoms  The history is provided by the Patient. This is a new problem. The current episode started more than 2 days ago. The problem occurs every few minutes. The problem has been gradually worsening. The cough is Productive of sputum. Associated symptoms include rhinorrhea. Pertinent negatives include no chest pain, no sweats, no eye redness, no ear congestion, no wheezing, no nausea, no vomiting and no confusion. Subjective:   Pt is a 49 y.o. female     Review of Systems   Constitutional: Negative. Negative for diaphoresis. HENT:  Positive for rhinorrhea. Negative for hearing loss. Eyes: Negative. Negative for redness. Respiratory:  Positive for sputum production. Negative for hemoptysis and wheezing. Cardiovascular: Negative. Negative for chest pain and palpitations. Gastrointestinal:  Negative for nausea and vomiting. Musculoskeletal: Negative. Skin: Negative. Negative for rash. Neurological: Negative. Endo/Heme/Allergies: Negative. Psychiatric/Behavioral:  Negative for confusion. Past Medical History:   Diagnosis Date    Cancer ([facility] [address] 09/2020    RIGHT BREAST CA    Diabetes ([email or web address] 07/2017    Type 1 diabetes    Menopause        Past Surgical History:   Procedure Laterality Date    HX APPENDECTOMY      HX BILATERAL MASTECTOMY  10/2020    HX BREAST RECONSTRUCTION Bilateral 10/27/2020    BILATERAL BREAST RECONSTRUCTION WITH GEL IMPLANTS performed by Rach Briones MD at [facility] 630  [phone]    HX COLONOSCOPY      HX MASTECTOMY Bilateral 10/27/2020    BILATERAL SKIN & NIPPLE SPARING MASTECTOMIES performed by Juan C Torres MD at [address] E X 3    IMPLANT BREAST SILICONE/EQ Bilateral     2008    [place] BREAST SURGERY PROCEDURE UNLISTED  2008    BREAST AUGMENTATION       Results for orders placed or performed in visit on 11/16/22   AMB POC WAGNER INFLUENZA A/B TEST   Result Value Ref Range    VALID INTERNAL CONTROL POC Yes     Influenza A Ag POC Negative Negative    Influenza B Ag POC Negative Negative   POCT COVID-19, SARS-COV-2, PCR   Result Value Ref Range    SARS-COV-2 PCR, POC Negative Negative             Objective:     Physical Exam  Constitutional:       Appearance: Normal appearance. She is normal weight. HENT:      Head: Normocephalic and atraumatic. Right Ear: Tympanic membrane and ear canal normal.      Left Ear: Tympanic membrane and ear canal normal.      Nose: Congestion and rhinorrhea present. Mouth/Throat:      Mouth: Mucous membranes are moist.   Eyes:      Extraocular Movements: Extraocular movements intact. Pupils: Pupils are equal, round, and reactive to light. Cardiovascular:      Rate and Rhythm: Normal rate and regular rhythm. Pulses: Normal pulses. Heart sounds: Normal heart sounds. Pulmonary:      Effort: Pulmonary effort is normal. No respiratory distress. Breath sounds: Normal breath sounds. No stridor. No wheezing, rhonchi or rales. Musculoskeletal:         General: Normal range of motion. Cervical back: Normal range of motion and neck supple. Skin:     General: Skin is warm and dry. Neurological:      General: No focal deficit present. Mental Status: She is alert and oriented to person, place, and time. Psychiatric:         Mood and Affect: Mood normal.         Behavior: Behavior normal.               An electronic signature was used to authenticate this note.   -- Chela Stout PA-C

## 2023-01-04 ENCOUNTER — OFFICE VISIT (OUTPATIENT)
Dept: PRIMARY CARE CLINIC | Age: 58
End: 2023-01-04

## 2023-01-04 VITALS
OXYGEN SATURATION: 98 % | HEART RATE: 66 BPM | TEMPERATURE: 98.5 F | SYSTOLIC BLOOD PRESSURE: 107 MMHG | RESPIRATION RATE: 18 BRPM | DIASTOLIC BLOOD PRESSURE: 76 MMHG | BODY MASS INDEX: 23.32 KG/M2 | HEIGHT: 65 IN | WEIGHT: 140 LBS

## 2023-01-04 DIAGNOSIS — B34.9 VIRAL ILLNESS: ICD-10-CM

## 2023-01-04 DIAGNOSIS — J01.00 ACUTE NON-RECURRENT MAXILLARY SINUSITIS: Primary | ICD-10-CM

## 2023-01-04 RX ORDER — AMOXICILLIN AND CLAVULANATE POTASSIUM 875; 125 MG/1; MG/1
1 TABLET, FILM COATED ORAL 2 TIMES DAILY
Qty: 14 TABLET | Refills: 0 | Status: SHIPPED | OUTPATIENT
Start: 2023-01-04 | End: 2023-01-11

## 2023-01-04 RX ORDER — PROMETHAZINE HYDROCHLORIDE AND DEXTROMETHORPHAN HYDROBROMIDE 6.25; 15 MG/5ML; MG/5ML
5 SYRUP ORAL
Qty: 120 ML | Refills: 0 | Status: SHIPPED | OUTPATIENT
Start: 2023-01-04 | End: 2023-01-11

## 2023-01-04 RX ORDER — BENZONATATE 200 MG/1
200 CAPSULE ORAL
Qty: 21 CAPSULE | Refills: 0 | Status: SHIPPED | OUTPATIENT
Start: 2023-01-04 | End: 2023-01-11

## 2023-01-04 NOTE — PROGRESS NOTES
Identified pt with two pt identifiers(name and ). Reviewed record in preparation for visit and have obtained necessary documentation. No chief complaint on file. Vitals:    23 1516   BP: 107/76   Pulse: 66   Resp: 18   Temp: 98.5 °F (36.9 °C)   TempSrc: Temporal   SpO2: 98%   Weight: 140 lb (63.5 kg)   Height: 5' 5\" (1.651 m)   PainSc:   2   LMP: 10/02/2017       Health Maintenance Due   Topic    Hepatitis C Screening     COVID-19 Vaccine (1)    Pneumococcal 0-64 years (1 - PCV)    Hepatitis B Vaccine (1 of 3 - Risk 3-dose series)    DTaP/Tdap/Td series (1 - Tdap)    Cervical cancer screen     Shingles Vaccine (1 of 2)    Colorectal Cancer Screening Combo     Flu Vaccine (1)    Eye Exam Retinal or Dilated        Coordination of Care Questionnaire:  :   1) Have you been to an emergency room, urgent care, or hospitalized since your last visit? If yes, where when, and reason for visit? no       2. Have seen or consulted any other health care provider since your last visit? If yes, where when, and reason for visit? NO      Patient is accompanied by self I have received verbal consent from Thomas Colunga to discuss any/all medical information while they are present in the room. An electronic signature was used to authenticate this note.   -- Karri Delgado LPN

## 2023-01-04 NOTE — PROGRESS NOTES
CC: Sinus pressure  HISTORY OF PRESENT ILLNESS  Sandeep Kasper is a 56 y.o. female. Patient reports 5 days ago symptoms of  pressure in forehead, drainage at night, coughing, irritation to throat, and left ear discomfort began. She reports taking an at home covid test then which was negative. She has been using Allegra, Flonase, and cough syrup with little relief. Reports she was around family during the holidays but denies anyone being ill, denies fevers or SOB. Has a history of frequent sinus and ear infections and states this feels like her typical sinus infection. Denies chest congestion. Denies smoking or having asthma. Review of Systems   Constitutional:  Negative for chills, fever and malaise/fatigue. HENT:  Positive for congestion and sinus pain. Negative for sore throat. Eyes: Negative. Respiratory:  Positive for cough. Negative for shortness of breath. Cardiovascular: Negative. Gastrointestinal: Negative. Genitourinary: Negative. Musculoskeletal: Negative. Skin: Negative. Neurological:  Negative for loss of consciousness. Endo/Heme/Allergies: Negative. Psychiatric/Behavioral: Negative. Physical Exam  Constitutional:       Appearance: Normal appearance. She is normal weight. She is not ill-appearing. HENT:      Head: Normocephalic. Right Ear: Tympanic membrane normal.      Left Ear: A middle ear effusion is present. Nose: Congestion present. No rhinorrhea. Mouth/Throat:      Mouth: Mucous membranes are moist.      Pharynx: Oropharynx is clear. Posterior oropharyngeal erythema present. No oropharyngeal exudate. Eyes:      Pupils: Pupils are equal, round, and reactive to light. Cardiovascular:      Rate and Rhythm: Normal rate. Pulses: Normal pulses. Heart sounds: Normal heart sounds. Pulmonary:      Effort: Pulmonary effort is normal.      Breath sounds: Normal breath sounds. No wheezing or rales.    Abdominal:      Palpations: Abdomen is soft. Musculoskeletal:         General: Normal range of motion. Cervical back: Normal range of motion and neck supple. Skin:     General: Skin is warm. Neurological:      General: No focal deficit present. Mental Status: She is alert and oriented to person, place, and time. Psychiatric:         Mood and Affect: Mood normal.         Behavior: Behavior normal.     Past Medical History:   Diagnosis Date    Cancer ([facility] [address] 09/2020    RIGHT BREAST CA    Diabetes ([facility] [address] 07/2017    Type 1 diabetes    Menopause      Past Surgical History:   Procedure Laterality Date    HX APPENDECTOMY      HX BILATERAL MASTECTOMY  10/2020    HX BREAST RECONSTRUCTION Bilateral 10/27/2020    BILATERAL BREAST RECONSTRUCTION WITH GEL IMPLANTS performed by Jennette Heimlich, MD at [facility] 630  [phone]    HX COLONOSCOPY      HX MASTECTOMY Bilateral 10/27/2020    BILATERAL SKIN & NIPPLE SPARING MASTECTOMIES performed by Renay Zurita MD at [address] [facility] E X 3    IMPLANT BREAST SILICONE/EQ Bilateral     2008    LA UNLISTED PROCEDURE BREAST  2008    BREAST AUGMENTATION     /76 (BP 1 Location: Left arm, BP Patient Position: Sitting, BP Cuff Size: Small adult)   Pulse 66   Temp 98.5 °F (36.9 °C) (Temporal)   Resp 18   Ht 5' 5\" (1.651 m)   Wt 140 lb (63.5 kg)   LMP 10/02/2017 (Within Weeks)   SpO2 98%   BMI 23.30 kg/m²   Results for orders placed or performed in visit on 01/04/23   AMB POC WAGNER INFLUENZA A/B TEST   Result Value Ref Range    VALID INTERNAL CONTROL POC Yes     Influenza A Ag POC Negative Negative    Influenza B Ag POC Negative Negative   POCT COVID-19, SARS-COV-2, PCR   Result Value Ref Range    SARS-COV-2 PCR, POC Negative Negative       ASSESSMENT and PLAN  1. Acute non-recurrent maxillary sinusitis  -     amoxicillin-clavulanate (AUGMENTIN) 875-125 mg per tablet;  Take 1 Tablet by mouth two (2) times a day for 7 days., Normal, Disp-14 Tablet, R-0  -     promethazine-dextromethorphan (PROMETHAZINE-DM) 6.25-15 mg/5 mL syrup; Take 5 mL by mouth every four (4) hours as needed for Cough for up to 7 days. , Normal, Disp-120 mL, R-0 - only at night to aid in sleep  -     benzonatate (TESSALON) 200 mg capsule; Take 1 Capsule by mouth three (3) times daily as needed for Cough for up to 7 days. , Normal, Disp-21 Capsule, R-0 - during the day. - Discussed steroid approach, patient declined reports using in November which caused her BS to increase.  - Recommended chaning flonase to nasacort and continue taking allergy medication  2.  Viral illness  -     AMB POC WAGNER INFLUENZA A/B TEST  -     POCT COVID-19, SARS-COV-2, PCR    Erik Robbins NP

## 2023-01-23 RX ORDER — PEN NEEDLE, DIABETIC 32GX 5/32"
NEEDLE, DISPOSABLE MISCELLANEOUS
Qty: 400 PEN NEEDLE | Refills: 3 | Status: SHIPPED | OUTPATIENT
Start: 2023-01-23

## 2023-02-15 ENCOUNTER — OFFICE VISIT (OUTPATIENT)
Dept: ENDOCRINOLOGY | Age: 58
End: 2023-02-15
Payer: COMMERCIAL

## 2023-02-15 VITALS
HEART RATE: 68 BPM | HEIGHT: 65 IN | BODY MASS INDEX: 22.79 KG/M2 | SYSTOLIC BLOOD PRESSURE: 126 MMHG | DIASTOLIC BLOOD PRESSURE: 69 MMHG | WEIGHT: 136.8 LBS

## 2023-02-15 DIAGNOSIS — E10.9 TYPE 1 DIABETES MELLITUS WITHOUT COMPLICATION (HCC): Primary | ICD-10-CM

## 2023-02-15 LAB — HBA1C MFR BLD HPLC: 7.2 %

## 2023-02-15 PROCEDURE — 3051F HG A1C>EQUAL 7.0%<8.0%: CPT | Performed by: INTERNAL MEDICINE

## 2023-02-15 PROCEDURE — 99214 OFFICE O/P EST MOD 30 MIN: CPT | Performed by: INTERNAL MEDICINE

## 2023-02-15 PROCEDURE — 83036 HEMOGLOBIN GLYCOSYLATED A1C: CPT | Performed by: INTERNAL MEDICINE

## 2023-02-15 RX ORDER — INSULIN ASPART 100 [IU]/ML
INJECTION, SOLUTION INTRAVENOUS; SUBCUTANEOUS
Qty: 30 ML | Refills: 6 | Status: SHIPPED | OUTPATIENT
Start: 2023-02-15

## 2023-02-15 NOTE — PROGRESS NOTES
Chief Complaint   Patient presents with    Diabetes     Pcp and pharmacy verified     Records since last visit reviewed     History of Present Illness: Yue Hassan is a 56 y.o. female here for follow up of diabetes. Pt notes that over [address] day weekend (2017)  she \"came down with a Virus and sinus infection\" She was put on Abx and she noted polyuria, polydipsia and blurred vision. She had also lost 10 pounds in a short period of time. She saw her eye doctor who noted swelling in her cornia (will request these records). The swelling improved but did not normalize, her eye doctor recommended she get labs drawn and her PCP cecilia labs, her BG was 300 and her PCP started her on Glipizide. She continued to have symptoms so she checked her BG on a glucometer said \"high\" so she went to the ED and her BG >600. Her A1C was 13.1%. AB testing showed positive [place] and CRP. At our initial visit in August 2017 I tested her for autoimmune DM   Her insulin, proinsulin and c-peptide were low normal and one of her 4 antibodies were positive. (ZN-8). This fits with Auto-immune DM Type 1/NII. She had COVID in August, Bronchitis in November 2022, and a sinus infection in January 2023. Pt notes she was put on prednisone in November when she had bronchitis. Her BGS were in the 500s during that time I had her take Basaglar 18 units daily but pt notes that did not help much. Pt is now currently taking Basaglar 15 units daily and Novolog 1:15 carb ratio 2-10 units per meal). She thinks the 1:15 may not be enough and she has been needing a bit more. She is testing her BGs using the FreeStyle Yudy 2 CGM to monitor her BGs she notes that she did like the Cramer and she notes that she has a friend who has a DexCom and she would be interested in looking into using the DexCom. I reviewed her BG readings and they have been running in the .  She notes the BGs in the 60s tend to be when she overcorrects for a high or if she over-calculates her bolus coverage. She notes she has been trying to be more physically active as well. Her A1C today was 7.2%. Pt was diagnosed with breast cancer, in her right breast in October 2020, she opted for bilateral mastectomy. She was taken to the OR by Dr. Carolyn Granados. The surgical pathology found 2 foci of microinvasion in the left breast as well. She is being followed by Dr. Mayuri Gramajo of oncology, who felt that the surgery was complete and did not need any further treatment. It was decided she did not need any chemotherapy or radiation. Pt is still getting up and going to work out in the morning most every day, around [id number].    She notes that \"I don't know how much I am going to eat at the meal so I don't bolus till after I have eaten. \"  \"I am a slow eater so I have to be careful about when I give my insulin. I try to limit my carb intake to make it safer and better. \"    She is an  and she notes this is a source of a lot of stress. Pt notes she does not eat three meals, but tends to \"graze\" during the day. She eats every 2 hours and will cover her carbs as needed. She does eat dinner around 5-6PM, last night she had steak, spaghetti squash, asparagus and red wine. No history of vascular disease. No history of neuropathy, or nephropathy. Last eye exam was February 2023, they did the eye exam and it looked all good and clear. \"      She had a BCC removed on 7/1/19. It required Moh's and \"they had to go a little deeper. Jagdish Michele got it all\". She had follow up in January 2020 and \"there were no concerning spots or anything. \"  She did not need any surgery on the spot on her nose. She sees dermatology every 6 months. \"I have had Moh's surgery 3 times\". She last saw her Dermatologist in November 2022. \"I go every 6 months. \"      Current Outpatient Medications   Medication Sig    NovoLOG FlexPen U-100 Insulin 100 unit/mL (3 mL) inpn Take 1:10 carb ratio with meals and correction. Max daily dose 60 units    BD Gauri 2nd Gen Pen Needle 32 gauge x 5/32\" ndle USE TO INJECT 4 TIMES A DAY    Accu-Chek Indio Plus test strp strip CHECK SUGARS 4 TIMES PER DAY    fexofenadine (ALLEGRA) 180 mg tablet Take  by mouth. insulin glargine (Basaglar KwikPen U-100 Insulin) 100 unit/mL (3 mL) inpn INJECT 15 UNITS EVERY DAY. lancets (BD Ultra Fine Lancets) 33 gauge misc Check blood sugar 4 times per day    IBUPROFEN PO Take 400 mg by mouth as needed. Blood-Glucose Meter (ACCU-CHEK INDIO PLUS METER) misc Check sugars 4 times per day    acyclovir (ZOVIRAX) 200 mg capsule as needed. multivitamin (ONE A DAY) tablet Take 1 Tab by mouth daily. No current facility-administered medications for this visit. Allergies   Allergen Reactions    Tree Nut Anaphylaxis     Not true allergy. Peanut sensitivity via allergy testing     Review of Systems:  - Eyes: no blurry vision or double vision  - Cardiovascular: no chest pain  - Respiratory: no shortness of breath  - Musculoskeletal: no myalgias  - Neurological: no numbness/tingling in extremities    Physical Examination:  Blood pressure 126/69, pulse 68, height 5' 5\" (1.651 m), weight 136 lb 12.8 oz (62.1 kg), last menstrual period 10/02/2017. General: pleasant, no distress, good eye contact   Neck: no carotid bruits  Cardiovascular: regular, normal rate, nl s1 and s2, no m/r/g, 2+ DP pulses   Respiratory: clear bilaterally  Integumentary: no edema, no foot ulcers  Psychiatric: normal mood and affect    Diabetic foot exam:     Left Foot:   Visual Exam: normal    Pulse DP: 2+ (normal)   Filament test: normal sensation    Vibratory sensation: normal      Right Foot:   Visual Exam: normal    Pulse DP: 2+ (normal)   Filament test: normal sensation    Vibratory sensation: normal        Data Reviewed:   Her A1C today was 7.2%. Assessment/Plan:   1) DM > Her A1C today was 7.2%.  For now  I will have pt continue the Basaglar 15 units daily and change her Novolog to 1:10 carb ratio. We discussed CGMs and since she is a Type 1 diabetic, is on 4-5 injections per day and adjusts her insulin dose based on her BG readings she would benefit from CGM. I will order a DexCom CGM FreeStyle Yudy 2 for pt to try. We discussed insulin pump therapy, but she would prefer to continue her current regimen. She is controlling her BGs very well with this regimen and I agree with her. Pt to check her BGs AC/HS (4 times per day) and mail her BG logs to me in 6 weeks. BP has been at goal on no HTN agents. Her TC and LDL had increased at our visit in February 2021. Pt started working on dietary lifestyle changes and her TC by June 2021 had declined from 205 to 193 and her LDL declined from 101 to 80. Her TC last week was 173 with LDL 87.  Pt does not need to be started on any lipid lowering agents. I spent 25 minutes on this case and > 50% of the time was spent in counseling on the above issues. Pt voices understanding and agreement with the plan. RTC 4 months. Follow-up and Dispositions    Return in about 4 months (around 6/15/2023).            Copy sent to:  Dr. Darshana Jin

## 2023-02-15 NOTE — LETTER
2/15/2023    Patient: Christiana Mann   YOB: 1965   Date of Visit: 2/15/2023     Jeana Thurman, [address] [facility]  [address] [address]  Via Fax: [phone]    Dear Jeana Thurman MD,      Thank you for referring Ms. Brittnee Crandall to [facility] for evaluation. My notes for this consultation are attached. If you have questions, please do not hesitate to call me. I look forward to following your patient along with you.       Sincerely,    Alley Morel MD

## 2023-02-15 NOTE — PATIENT INSTRUCTIONS
1) Continue the Basaglar 15 units every day. 2) Change your Novolog carb ratio to 1:10, plus the correction scale.     150-199 1 additional units  200-249 2 additional units  250-299 3 additional units  300-349 4 additional units  350-399 5 additional units  400-449 6 additional units  450-499 7 additional units  500-549 8 additional units  550+ 9 additional units

## 2023-02-27 RX ORDER — BLOOD-GLUCOSE,RECEIVER,CONT
EACH MISCELLANEOUS
Qty: 1 EACH | Refills: 0 | Status: SHIPPED | OUTPATIENT
Start: 2023-02-27

## 2023-02-27 RX ORDER — BLOOD-GLUCOSE TRANSMITTER
EACH MISCELLANEOUS
Qty: 1 EACH | Refills: 3 | Status: SHIPPED | OUTPATIENT
Start: 2023-02-27

## 2023-02-27 RX ORDER — BLOOD-GLUCOSE SENSOR
EACH MISCELLANEOUS
Qty: 3 EACH | Refills: 11 | Status: SHIPPED | OUTPATIENT
Start: 2023-02-27

## 2023-03-21 RX ORDER — INSULIN GLARGINE 100 [IU]/ML
INJECTION, SOLUTION SUBCUTANEOUS
Qty: 15 ML | Refills: 5 | Status: SHIPPED | OUTPATIENT
Start: 2023-03-21

## 2023-06-21 ENCOUNTER — OFFICE VISIT (OUTPATIENT)
Age: 58
End: 2023-06-21

## 2023-06-21 VITALS
WEIGHT: 142.4 LBS | HEART RATE: 67 BPM | SYSTOLIC BLOOD PRESSURE: 122 MMHG | HEIGHT: 65 IN | BODY MASS INDEX: 23.72 KG/M2 | DIASTOLIC BLOOD PRESSURE: 75 MMHG

## 2023-06-21 DIAGNOSIS — E10.65 TYPE 1 DIABETES MELLITUS WITH HYPERGLYCEMIA (HCC): ICD-10-CM

## 2023-06-21 DIAGNOSIS — E10.9 TYPE 1 DIABETES MELLITUS WITHOUT COMPLICATIONS (HCC): Primary | ICD-10-CM

## 2023-06-21 LAB — HBA1C MFR BLD: 6.5 %

## 2023-06-21 RX ORDER — PROCHLORPERAZINE 25 MG/1
SUPPOSITORY RECTAL
COMMUNITY
Start: 2023-06-21

## 2023-06-21 RX ORDER — BLOOD-GLUCOSE,RECEIVER,CONT
EACH MISCELLANEOUS
Qty: 1 EACH | Refills: 0 | Status: SHIPPED | OUTPATIENT
Start: 2023-06-21

## 2023-06-21 RX ORDER — BLOOD-GLUCOSE SENSOR
EACH MISCELLANEOUS
Qty: 9 EACH | Refills: 3 | Status: SHIPPED | OUTPATIENT
Start: 2023-06-21

## 2023-06-21 NOTE — PROGRESS NOTES
Chief Complaint   Patient presents with    Diabetes     Pcp and pharmacy verified     History of Present Illness: Chelsie Quigley is a 56 y.o. female here for follow up of diabetes. Pt notes that over [address] day weekend (2017)  she \"came down with a Virus and sinus infection\" She was put on Abx and she noted polyuria, polydipsia and blurred vision. She had also lost 10 pounds in a short period of time. She saw her eye doctor who noted swelling in her cornia (will request these records). The swelling improved but did not normalize, her eye doctor recommended she get labs drawn and her PCP pallavi labs, her BG was 300 and her PCP started her on Glipizide. She continued to have symptoms so she checked her BG on a glucometer said \"high\" so she went to the ED and her BG >600. Her A1C was 13.1%. AB testing showed positive ANTONELLA and CRP. At our initial visit in August 2017 I tested her for autoimmune DM   Her insulin, proinsulin and c-peptide were low normal and one of her 4 antibodies were positive. (ZN-8). This fits with Auto-immune DM Type 1/SADIQ. At our last visit in February 2023 her A1C was 7.2%, she had been fighting bronchitis and a sinus infection and was on prednisone which caused her Bgs to run higher. I instructed her to take Basaglar 15 units daily and Novolog 1:10 carb ratio with meals. Pt denies any recent illnesses, injuries or hospitalizations. Pt is now using a DexCom CGM to monitor her Bgs. Pt is now currently taking Basaglar 17 units daily and Novolog 1:10 carb ratio (3-5 units per meal). Her FBGs have been running in the 120-150s, her afternoon BGS run in the 120-220s range, in the evening her Bgs are running in the 150s range. \"My blood sugars seem to spike up around 2AM and by 5-6AM it will come back down. \"  She has not been having issues of hypoglycemia. Her A1C today was 6.5%.     Pt was diagnosed with breast cancer, in her right breast in October 2020, she opted for

## 2023-07-12 RX ORDER — BLOOD SUGAR DIAGNOSTIC
STRIP MISCELLANEOUS
Qty: 400 STRIP | Refills: 3 | Status: SHIPPED | OUTPATIENT
Start: 2023-07-12

## 2023-08-10 ENCOUNTER — NURSE ONLY (OUTPATIENT)
Age: 58
End: 2023-08-10

## 2023-08-10 DIAGNOSIS — E10.9 TYPE 1 DIABETES MELLITUS WITHOUT COMPLICATION (HCC): Primary | ICD-10-CM

## 2023-08-11 NOTE — PROGRESS NOTES
[facility]  Diabetes Self-Management Education & Support Program  Encounter Note      SUMMARY  Diabetes self-care management training was completed related to insulin pump explore visit. EVALUATION:  Sona and I met today to discuss pump options for her. Currently wears Dexcom G6 and will be upgrading to [address]. We discussed this related to integrated pumping systems and the G7 is pending integration with unknown date for release. She is was provided with both product information, sample pods to wear and can provide infusion set for trial wear prior to committing to system. RECOMMENDATIONS:  1) try the sample pods - one on arm and one on abdomen  2) let me know if you would like to try infusion sets. 3) provided brochures and encouraged to reach out to her insurer to assist with costs/coverage and out of pocket expenses. TOPICS DISCUSSED TODAY:  Insulin pump therapy and exploring options. Next provider visit is scheduled for 10/25/23         DATE DSMES TOPIC EVALUATION     8/11/2023 HOW DO MY DIABETES MEDICATIONS WORK? Insulin pump therapy medications & methods   Insulin pump benefits and limitations  Understanding basal and bolus insulin  Navigating menus   Insulin pump settings  Infusion sets and site selection  Sensor and pump integration if applicable  Pump and smartphone apps  Troubleshooting hypo and hyperglycemia  Glucagon emergency kits  Medical procedures and warnings/warranty  Traveling with insulin pump and medications. Barriers to medication adherence. Sona and I met when she first was dx. She shared that over the years, she has been experiencing decreasing levels of pancreatic beta cell function to the point that she is no longer making insulin. She is taking Basaglar 15 units currently for summer but when in school she needs 17 units. May need two patterns for running the lower dose w/out hypoglycemia alerts increasing.   Scott Jonathan is using insulin

## 2023-09-11 ENCOUNTER — OFFICE VISIT (OUTPATIENT)
Age: 58
End: 2023-09-11

## 2023-09-11 VITALS
WEIGHT: 142 LBS | BODY MASS INDEX: 23.66 KG/M2 | HEART RATE: 75 BPM | RESPIRATION RATE: 16 BRPM | DIASTOLIC BLOOD PRESSURE: 83 MMHG | TEMPERATURE: 97.2 F | OXYGEN SATURATION: 98 % | HEIGHT: 65 IN | SYSTOLIC BLOOD PRESSURE: 117 MMHG

## 2023-09-11 DIAGNOSIS — J06.9 ACUTE UPPER RESPIRATORY INFECTION: Primary | ICD-10-CM

## 2023-09-11 ASSESSMENT — ENCOUNTER SYMPTOMS
NAUSEA: 1
COUGH: 1
SINUS PAIN: 1
SORE THROAT: 1
RHINORRHEA: 1
SINUS PRESSURE: 1

## 2023-09-11 NOTE — PROGRESS NOTES
Chief Complaint   Patient presents with    Cough     Productive. Yellow and green. Eye Drainage    Head Congestion     HISTORY OF PRESENT ILLNESS  Amanda Griffin is a 56 y.o. female. Patient reports two days ago she began to have sinus congestion and pressure. She reports cough, sore throat, sinus congestion with yellow and green discharge, nausea, and headache. She denies fever and reports negative home Covid test. She has taken 400mg ibuprofen every 6hrs with some relief of headache. She currently takes Allegra which she has doubled her dose of and she also uses Nasacort nasal spray daily. Review of Systems   HENT:  Positive for postnasal drip, rhinorrhea, sinus pressure, sinus pain and sore throat. Respiratory:  Positive for cough. Gastrointestinal:  Positive for nausea. Physical Exam  Constitutional:       Appearance: Normal appearance. HENT:      Head: Normocephalic and atraumatic. Right Ear: Ear canal normal. A middle ear effusion is present. Left Ear: Ear canal normal. A middle ear effusion is present. Nose: Congestion present. Right Turbinates: Swollen. Left Turbinates: Swollen. Right Sinus: Maxillary sinus tenderness and frontal sinus tenderness present. Left Sinus: Maxillary sinus tenderness and frontal sinus tenderness present. Mouth/Throat:      Mouth: Mucous membranes are moist.      Pharynx: Posterior oropharyngeal erythema present. Cardiovascular:      Rate and Rhythm: Normal rate and regular rhythm. Pulses: Normal pulses. Heart sounds: Normal heart sounds. Pulmonary:      Effort: Pulmonary effort is normal.      Breath sounds: Normal breath sounds. Neurological:      Mental Status: She is alert.          Past Medical History:   Diagnosis Date    Cancer ([facility]) 09/2020    RIGHT BREAST CA    Diabetes ([facility]) 07/2017    Type 1 diabetes    Menopause      Past Surgical History:   Procedure Laterality Date    APPENDECTOMY

## 2023-09-11 NOTE — PATIENT INSTRUCTIONS
- Tylenol Extra strength 2 tabs or Ibuprofen 3-4 tabs every 6-8 hours for pain.  - OTC Antihistamines like Zyrtec or Claritin  - Warm salt water gargles will help with sore throat  - Nasacort nasal spray daily  - Humidifier in room at night  - Vicks Vapor rub   - Plenty of fluids    Follow-up if symptoms worsen or continue

## 2023-10-01 DIAGNOSIS — E10.65 TYPE 1 DIABETES MELLITUS WITH HYPERGLYCEMIA (HCC): ICD-10-CM

## 2023-10-11 ENCOUNTER — OFFICE VISIT (OUTPATIENT)
Age: 58
End: 2023-10-11

## 2023-10-11 VITALS
HEIGHT: 65 IN | SYSTOLIC BLOOD PRESSURE: 139 MMHG | WEIGHT: 142 LBS | DIASTOLIC BLOOD PRESSURE: 93 MMHG | TEMPERATURE: 98.2 F | BODY MASS INDEX: 23.66 KG/M2 | HEART RATE: 67 BPM | RESPIRATION RATE: 18 BRPM | OXYGEN SATURATION: 98 %

## 2023-10-11 DIAGNOSIS — J32.0 LEFT MAXILLARY SINUSITIS: Primary | ICD-10-CM

## 2023-10-11 DIAGNOSIS — S03.00XA TMJ (DISLOCATION OF TEMPOROMANDIBULAR JOINT), INITIAL ENCOUNTER: ICD-10-CM

## 2023-10-11 PROCEDURE — 99213 OFFICE O/P EST LOW 20 MIN: CPT

## 2023-10-11 RX ORDER — AMOXICILLIN AND CLAVULANATE POTASSIUM 875; 125 MG/1; MG/1
1 TABLET, FILM COATED ORAL 2 TIMES DAILY
Qty: 14 TABLET | Refills: 0 | Status: SHIPPED | OUTPATIENT
Start: 2023-10-11 | End: 2023-10-18

## 2023-10-11 NOTE — PATIENT INSTRUCTIONS
- Continue with Nasacort daily and a non drowsy allergy medication such as allegra,  zyrtec, claritin or xyzal (alternating every 3-6 months for maintenance of environmental allergies. - Consider using a combination cough and decongestant medicine such as Mucinex D OR Sudafed. (Push plenty of fluids with these medication as it will dry you up). - Consider using OTC ibuprofen 600- 800 mg every 6-8 hours for pain and discomfort on a scheduled basis for the next 3-4. Take with food. Alternate with Acetaminophen (Tylenol): 500mg 1-2 tablets every 6 hours as needed for pain.     - Follow up with orthodontics for TMJ

## 2023-10-12 DIAGNOSIS — E10.65 TYPE 1 DIABETES MELLITUS WITH HYPERGLYCEMIA (HCC): ICD-10-CM

## 2023-10-13 LAB
ALBUMIN SERPL-MCNC: 3.7 G/DL (ref 3.5–5)
ALBUMIN/GLOB SERPL: 1.2 (ref 1.1–2.2)
ALP SERPL-CCNC: 64 U/L (ref 45–117)
ALT SERPL-CCNC: 26 U/L (ref 12–78)
ANION GAP SERPL CALC-SCNC: 4 MMOL/L (ref 5–15)
AST SERPL-CCNC: 18 U/L (ref 15–37)
BILIRUB SERPL-MCNC: 0.5 MG/DL (ref 0.2–1)
BUN SERPL-MCNC: 7 MG/DL (ref 6–20)
BUN/CREAT SERPL: 9 (ref 12–20)
CALCIUM SERPL-MCNC: 9 MG/DL (ref 8.5–10.1)
CHLORIDE SERPL-SCNC: 102 MMOL/L (ref 97–108)
CHOLEST SERPL-MCNC: 207 MG/DL
CO2 SERPL-SCNC: 29 MMOL/L (ref 21–32)
CREAT SERPL-MCNC: 0.81 MG/DL (ref 0.55–1.02)
CREAT UR-MCNC: 25.2 MG/DL
EST. AVERAGE GLUCOSE BLD GHB EST-MCNC: 146 MG/DL
GLOBULIN SER CALC-MCNC: 3.1 G/DL (ref 2–4)
GLUCOSE SERPL-MCNC: 177 MG/DL (ref 65–100)
HBA1C MFR BLD: 6.7 % (ref 4–5.6)
HDLC SERPL-MCNC: 111 MG/DL
HDLC SERPL: 1.9 (ref 0–5)
LDLC SERPL CALC-MCNC: 85.8 MG/DL (ref 0–100)
MICROALBUMIN UR-MCNC: <0.5 MG/DL
MICROALBUMIN/CREAT UR-RTO: <20 MG/G (ref 0–30)
POTASSIUM SERPL-SCNC: 4.3 MMOL/L (ref 3.5–5.1)
PROT SERPL-MCNC: 6.8 G/DL (ref 6.4–8.2)
SODIUM SERPL-SCNC: 135 MMOL/L (ref 136–145)
TRIGL SERPL-MCNC: 51 MG/DL
TSH SERPL DL<=0.05 MIU/L-ACNC: 3.04 UIU/ML (ref 0.36–3.74)
VLDLC SERPL CALC-MCNC: 10.2 MG/DL

## 2023-10-16 RX ORDER — AMOXICILLIN AND CLAVULANATE POTASSIUM 875; 125 MG/1; MG/1
1 TABLET, FILM COATED ORAL 2 TIMES DAILY
Qty: 14 TABLET | Refills: 0 | Status: SHIPPED | OUTPATIENT
Start: 2023-10-16 | End: 2023-10-23

## 2023-10-25 ENCOUNTER — OFFICE VISIT (OUTPATIENT)
Age: 58
End: 2023-10-25

## 2023-10-25 VITALS
HEART RATE: 68 BPM | DIASTOLIC BLOOD PRESSURE: 78 MMHG | HEIGHT: 65 IN | SYSTOLIC BLOOD PRESSURE: 124 MMHG | BODY MASS INDEX: 23.53 KG/M2 | WEIGHT: 141.2 LBS

## 2023-10-25 DIAGNOSIS — E10.9 TYPE 1 DIABETES MELLITUS WITHOUT COMPLICATIONS (HCC): Primary | ICD-10-CM

## 2023-10-25 NOTE — PROGRESS NOTES
Chief Complaint   Patient presents with    Diabetes     Pcp and pharmacy verified     History of Present Illness: Saud Lindsey is a 56 y.o. female here for follow up of diabetes. Pt notes that over [address] day weekend (2017)  she \"came down with a Virus and sinus infection\" She was put on Abx and she noted polyuria, polydipsia and blurred vision. She had also lost 10 pounds in a short period of time. She saw her eye doctor who noted swelling in her cornia (will request these records). The swelling improved but did not normalize, her eye doctor recommended she get labs drawn and her PCP pallavi labs, her BG was 300 and her PCP started her on Glipizide. She continued to have symptoms so she checked her BG on a glucometer said \"high\" so she went to the ED and her BG >600. Her A1C was 13.1%. AB testing showed positive ANTONELLA and CRP. At our initial visit in August 2017 I tested her for autoimmune DM   Her insulin, proinsulin and c-peptide were low normal and one of her 4 antibodies were positive. (ZN-8). This fits with Auto-immune DM Type 1/SADIQ. \"I have been sick again, for the last 6 weeks I have been fighting bronchitis and a sinus infection. \"It was discovered that I had a tooth with a root canal 5 years ago and that has developed a pocket of infection, which is draining into my sinuses. I went to see an endodontist, they don't feel more surgery is the right thing, but they plan to pull the tooth and insert an implant in the future. \"  \"My sugars have been higher since I have been sick. \"    Pt is taking Basaglar 18 units every day and Novolog 1:10 carb ratio with meals. She notes that since being sick her BG tend to be slower to respond to the Novolog and it can take up to 2 hours to start coming down. Pt is now using a DexCom CGM to monitor her BG. I reviewed her last 14 days of CGM data.   She has had some low BG, particularly on days when she is more active, but if she is less active her her FBG can

## 2023-11-20 ENCOUNTER — OFFICE VISIT (OUTPATIENT)
Age: 58
End: 2023-11-20
Payer: COMMERCIAL

## 2023-11-20 VITALS — HEIGHT: 65 IN | BODY MASS INDEX: 23.49 KG/M2 | WEIGHT: 141 LBS

## 2023-11-20 DIAGNOSIS — Z90.13 STATUS POST BILATERAL MASTECTOMY: ICD-10-CM

## 2023-11-20 DIAGNOSIS — Z85.3 PERSONAL HISTORY OF MALIGNANT NEOPLASM OF BREAST: Primary | ICD-10-CM

## 2023-11-20 DIAGNOSIS — C50.911 MALIGNANT NEOPLASM OF RIGHT BREAST IN FEMALE, ESTROGEN RECEPTOR NEGATIVE, UNSPECIFIED SITE OF BREAST (HCC): ICD-10-CM

## 2023-11-20 DIAGNOSIS — Z17.1 MALIGNANT NEOPLASM OF RIGHT BREAST IN FEMALE, ESTROGEN RECEPTOR NEGATIVE, UNSPECIFIED SITE OF BREAST (HCC): ICD-10-CM

## 2023-11-20 PROCEDURE — 99213 OFFICE O/P EST LOW 20 MIN: CPT | Performed by: NURSE PRACTITIONER

## 2023-11-20 NOTE — PROGRESS NOTES
HISTORY OF PRESENT ILLNESS  Yves Wood is a 56 y.o. female     HPI   Established patient presents for follow-up to RIGHT breast cancer. No breast concerns and no pain. Breast history -   Referring - [facility]  2020: R breast biopsy- 12:00 O clock- DCIS - ER and VT negative, 10: 00 O clock DCIS  10/27/2020: Bilateral skin sparing  mastectomy - Dr. Kulwinder Galarza and Dr. Jeanette Salamanca   DCIS and microinvasive breast cancer. 2 negative nodes, ER 10%, VT negative  T1miN0  2020 - consult with Dr. Tamy Childers - no adjuvant therapy        Family history -  Paternal GM had breast cancer, diagnosed late 63's. Maternal aunt, 52's  Maternal aunt, 52's         OB History    No obstetric history on file. Obstetric Comments   Menarche:  15. LMP: 2017. # of Children:  2. Age at Delivery of First Child:  36.   Hysterectomy/oophorectomy:  NO/NO. Breast Bx:  No prior. Hx of Breast Feeding:  yes. BCP:  yes. Hormone therapy:  no.                  Past Surgical History:   Procedure Laterality Date    APPENDECTOMY      BREAST RECONSTRUCTION Bilateral 10/27/2020    BILATERAL BREAST RECONSTRUCTION WITH GEL IMPLANTS performed by Kiera Priest MD at [address] [facility] [facility]      BREAST AUGMENTATION     SECTION  [id number]    COLONOSCOPY      IMPLANT BREAST SILICONE/EQ Bilateral         MASTECTOMY Bilateral 10/27/2020    BILATERAL SKIN & NIPPLE SPARING MASTECTOMIES performed by Chasity Castellanos MD at [address] [place] [address], BILATERAL  10/2020    OTHER SURGICAL HISTORY      MOHS PROCEDURE X 3    US BREAST BIOPSY NEEDLE ADDITIONAL RIGHT Right 2020    US BREAST BIOPSY NEEDLE ADDITIONAL RIGHT 2020 [facility] RAD MAMMO    US BREAST BIOPSY W LOC DEVICE 1ST LESION RIGHT Right 2020    US BREAST NEEDLE BIOPSY RIGHT 2020 [facility] RAD MAMMO       Review of Systems      Physical Exam  Constitutional:       Appearance: Normal appearance. Chest:   Breasts:     Right: Absent.       Left:

## 2023-12-21 ENCOUNTER — TELEMEDICINE (OUTPATIENT)
Facility: CLINIC | Age: 58
End: 2023-12-21
Payer: COMMERCIAL

## 2023-12-21 DIAGNOSIS — U07.1 COVID: Primary | ICD-10-CM

## 2023-12-21 PROCEDURE — 99213 OFFICE O/P EST LOW 20 MIN: CPT | Performed by: NURSE PRACTITIONER

## 2023-12-21 ASSESSMENT — PATIENT HEALTH QUESTIONNAIRE - PHQ9
SUM OF ALL RESPONSES TO PHQ QUESTIONS 1-9: 0
SUM OF ALL RESPONSES TO PHQ QUESTIONS 1-9: 0
2. FEELING DOWN, DEPRESSED OR HOPELESS: 0
SUM OF ALL RESPONSES TO PHQ9 QUESTIONS 1 & 2: 0
1. LITTLE INTEREST OR PLEASURE IN DOING THINGS: 0
SUM OF ALL RESPONSES TO PHQ QUESTIONS 1-9: 0
SUM OF ALL RESPONSES TO PHQ QUESTIONS 1-9: 0

## 2023-12-21 NOTE — PROGRESS NOTES
Sona Regan, was evaluated through a synchronous (real-time) audio-video encounter. The patient (or guardian if applicable) is aware that this is a billable service, which includes applicable co-pays. This Virtual Visit was conducted with patient's (and/or legal guardian's) consent. Patient identification was verified, and a caregiver was present when appropriate.   The patient was located at Home: [address] [place] [address] [facility] [id number]  Provider was located at Facility (Appt Dept): [address] [place] [id number]      Sona Regan (:  1965) is a Established patient, presenting virtually for evaluation of the following:  COVID positive.    Assessment & Plan   Below is the assessment and plan developed based on review of pertinent history, physical exam, labs, studies, and medications.  1. COVID  -     nirmatrelvir/ritonavir 300/100 (PAXLOVID, 300/100,) 20 x 150 MG & 10 x 100MG TBPK; Take 3 tablets (two 150 mg nirmatrelvir and one 100 mg ritonavir tablets) by mouth every 12 hours for 5 days., Disp-30 tablet, R-0Normal    Reviewed med admin/r/b/se    RTO if no improvement.  RTO as already scheduled.  Sooner if needed.  No follow-ups on file.       Subjective   HPI  Review of Systems       Objective   Patient-Reported Vitals  No data recorded       Physical Exam             --MARILEE Franco - NP

## 2023-12-21 NOTE — PROGRESS NOTES
Sona Regan is a 57 y.o. female     Chief Complaint   Patient presents with    New Patient     New patient        There were no vitals taken for this visit.    Health Maintenance Due   Topic Date Due    Hepatitis B vaccine (1 of 3 - 3-dose series) Never done    COVID-19 Vaccine (1) Never done    HIV screen  Never done    Hepatitis C screen  Never done    DTaP/Tdap/Td vaccine (1 - Tdap) Never done    Cervical cancer screen  Never done    Colorectal Cancer Screen  Never done    Pneumococcal 0-64 years Vaccine (2 - PPSV23 or PCV20) 12/11/2017    Shingles vaccine (1 of 2) Never done    Diabetic retinal exam  03/11/2023    Flu vaccine (1) 08/01/2023    Depression Screen  11/16/2023         \"Have you been to the ER, urgent care clinic since your last visit?  Hospitalized since your last visit?\"    NO    “Have you seen or consulted any other health care providers outside of [facility] since your last visit?”    NO    “Have you had a colorectal cancer screening such as a colonoscopy/FIT/Cologuard?    YES - Type: Colonoscopy - Where: Patient doesn't remember where she had her colonoscopy at Nurse/CMA to request most recent records if not in the chart       “Have you had a pap smear?”    YES - Where: May or June of this year. [facility]  Nurse/CMA to request most recent records if not in the chart

## 2024-01-02 NOTE — TELEPHONE ENCOUNTER
Per last OV: 1) DM > Her A1C last week was 6.7%. Pt to increase her Basagalr to 19 units per day

## 2024-01-02 NOTE — TELEPHONE ENCOUNTER
----- Message from Sona Regan sent at 1/1/2024  4:14 PM EST -----  Regarding: Bagaslar alternative medicine  Contact: [phone]  Happy New Year!  My insurance will not cover Bagaslar, so [facility] Is going to be contacting you about an alternative.   Thank you!  Sona Regan

## 2024-01-03 RX ORDER — INSULIN GLARGINE 100 [IU]/ML
INJECTION, SOLUTION SUBCUTANEOUS
Qty: 5 ADJUSTABLE DOSE PRE-FILLED PEN SYRINGE | Refills: 3 | Status: SHIPPED | OUTPATIENT
Start: 2024-01-03

## 2024-02-01 DIAGNOSIS — E10.9 TYPE 1 DIABETES MELLITUS WITHOUT COMPLICATIONS (HCC): ICD-10-CM

## 2024-02-03 LAB
ALBUMIN SERPL-MCNC: 4.3 G/DL (ref 3.8–4.9)
ALBUMIN/GLOB SERPL: 1.9 {RATIO} (ref 1.2–2.2)
ALP SERPL-CCNC: 60 IU/L (ref 44–121)
ALT SERPL-CCNC: 20 IU/L (ref 0–32)
AST SERPL-CCNC: 21 IU/L (ref 0–40)
BILIRUB SERPL-MCNC: 0.5 MG/DL (ref 0–1.2)
BUN SERPL-MCNC: 13 MG/DL (ref 6–24)
BUN/CREAT SERPL: 19 (ref 9–23)
CALCIUM SERPL-MCNC: 9.1 MG/DL (ref 8.7–10.2)
CHLORIDE SERPL-SCNC: 98 MMOL/L (ref 96–106)
CO2 SERPL-SCNC: 23 MMOL/L (ref 20–29)
CREAT SERPL-MCNC: 0.68 MG/DL (ref 0.57–1)
EGFRCR SERPLBLD CKD-EPI 2021: 102 ML/MIN/1.73
GLOBULIN SER CALC-MCNC: 2.3 G/DL (ref 1.5–4.5)
GLUCOSE SERPL-MCNC: 198 MG/DL (ref 70–99)
HBA1C MFR BLD: 6.6 % (ref 4.8–5.6)
POTASSIUM SERPL-SCNC: 4.6 MMOL/L (ref 3.5–5.2)
PROT SERPL-MCNC: 6.6 G/DL (ref 6–8.5)
SODIUM SERPL-SCNC: 136 MMOL/L (ref 134–144)

## 2024-02-27 ENCOUNTER — OFFICE VISIT (OUTPATIENT)
Age: 59
End: 2024-02-27
Payer: COMMERCIAL

## 2024-02-27 VITALS
DIASTOLIC BLOOD PRESSURE: 65 MMHG | SYSTOLIC BLOOD PRESSURE: 103 MMHG | BODY MASS INDEX: 22.92 KG/M2 | WEIGHT: 137.6 LBS | HEART RATE: 63 BPM | HEIGHT: 65 IN

## 2024-02-27 DIAGNOSIS — E10.9 TYPE 1 DIABETES MELLITUS WITHOUT COMPLICATIONS (HCC): Primary | ICD-10-CM

## 2024-02-27 PROCEDURE — 99214 OFFICE O/P EST MOD 30 MIN: CPT | Performed by: INTERNAL MEDICINE

## 2024-02-27 PROCEDURE — 3044F HG A1C LEVEL LT 7.0%: CPT | Performed by: INTERNAL MEDICINE

## 2024-02-27 PROCEDURE — 95251 CONT GLUC MNTR ANALYSIS I&R: CPT | Performed by: INTERNAL MEDICINE

## 2024-02-27 NOTE — PROGRESS NOTES
diagnosed with breast cancer, in her right breast in October 2020, she opted for bilateral mastectomy.  She was taken to the OR by Dr. Mariya Dasilva. The surgical pathology found 2 foci of microinvasion in the left breast as well.  She is still following with Dr. Dasilva.    She is being followed by Dr. Bhumika Morris of oncology, who felt that the surgery was complete and did not need any further treatment. It was decided she did not need any chemotherapy or radiation. \"I don't need to see Dr. Morris any longer.\"    - Pt wakes around 515AM and going to work out in the morning most every day.   - She has breakfast around 830-9AM, yesterday she had egg whites and coffee (SF creamer).  - she will snack on fruit around 1030-11AM.   - She will have lunch around 1230-1PM. Yesterday she has chicken salad with egg whites, humus and carrots.   - She has dinner around 6-8PM, last night she had chicken stew and water.        She notes that \"I don't know how much I am going to eat at the meal so I don't bolus till after I have eaten.\"  \"I am a slow eater so I have to be careful about when I give my insulin. I try to limit my carb intake to make it safer and better.\"    She is an  and she notes this is a source of a lot of stress.       No history of vascular disease.      No history of neuropathy, or nephropathy.      Last eye exam was February 2023, they did the eye exam and it looked all good and clear. I am now due for follow up.\"      She had a BCC removed on 7/1/19. It required Moh's and \"they had to go a little deeper. \"They got it all\". She had follow up in January 2020 and \"there were no concerning spots or anything.\"  She did not need any surgery on the spot on her nose.  She sees dermatology every 6 months. \"I have had Moh's surgery 3 times\".  She last saw her Dermatologist in August 2023. \"I go every 6 months.\" \"They said every thing was looking good.\"    Current Outpatient Medications   Medication Sig

## 2024-06-03 RX ORDER — BLOOD-GLUCOSE SENSOR
EACH MISCELLANEOUS
Qty: 6 EACH | Refills: 3 | Status: SHIPPED | OUTPATIENT
Start: 2024-06-03

## 2024-06-03 NOTE — TELEPHONE ENCOUNTER
----- Message from Sona Regan sent at 6/3/2024  7:20 AM EDT -----  Regarding: Jake 3  Contact: [phone]  Kiley Coreas  I am testing the CGM Jake 3 currently due to my allergic reaction to the Dexcom. The Jake 3 is going well, so I am requesting a new prescription for the Jake 3 please. [facility] use [facility].  Thank you,  Sona Regan  [phone]

## 2024-06-21 LAB
ALBUMIN SERPL-MCNC: 3.7 G/DL (ref 3.5–5)
ALBUMIN/GLOB SERPL: 1.3 (ref 1.1–2.2)
ALP SERPL-CCNC: 67 U/L (ref 45–117)
ALT SERPL-CCNC: 24 U/L (ref 12–78)
ANION GAP SERPL CALC-SCNC: 1 MMOL/L (ref 5–15)
AST SERPL-CCNC: 22 U/L (ref 15–37)
BILIRUB SERPL-MCNC: 0.6 MG/DL (ref 0.2–1)
BUN SERPL-MCNC: 15 MG/DL (ref 6–20)
BUN/CREAT SERPL: 20 (ref 12–20)
CALCIUM SERPL-MCNC: 8.9 MG/DL (ref 8.5–10.1)
CHLORIDE SERPL-SCNC: 105 MMOL/L (ref 97–108)
CHOLEST SERPL-MCNC: 196 MG/DL
CO2 SERPL-SCNC: 30 MMOL/L (ref 21–32)
CREAT SERPL-MCNC: 0.75 MG/DL (ref 0.55–1.02)
EST. AVERAGE GLUCOSE BLD GHB EST-MCNC: 151 MG/DL
GLOBULIN SER CALC-MCNC: 2.9 G/DL (ref 2–4)
GLUCOSE SERPL-MCNC: 71 MG/DL (ref 65–100)
HBA1C MFR BLD: 6.9 % (ref 4–5.6)
HDLC SERPL-MCNC: 110 MG/DL
HDLC SERPL: 1.8 (ref 0–5)
LDLC SERPL CALC-MCNC: 77 MG/DL (ref 0–100)
POTASSIUM SERPL-SCNC: 4.7 MMOL/L (ref 3.5–5.1)
PROT SERPL-MCNC: 6.6 G/DL (ref 6.4–8.2)
SODIUM SERPL-SCNC: 136 MMOL/L (ref 136–145)
TRIGL SERPL-MCNC: 45 MG/DL
TSH SERPL DL<=0.05 MIU/L-ACNC: 2.13 UIU/ML (ref 0.36–3.74)
VLDLC SERPL CALC-MCNC: 9 MG/DL

## 2024-06-27 ENCOUNTER — OFFICE VISIT (OUTPATIENT)
Age: 59
End: 2024-06-27
Payer: COMMERCIAL

## 2024-06-27 VITALS
HEART RATE: 80 BPM | DIASTOLIC BLOOD PRESSURE: 71 MMHG | BODY MASS INDEX: 23.59 KG/M2 | SYSTOLIC BLOOD PRESSURE: 99 MMHG | WEIGHT: 141.6 LBS | HEIGHT: 65 IN

## 2024-06-27 DIAGNOSIS — E10.65 TYPE 1 DIABETES MELLITUS WITH HYPERGLYCEMIA (HCC): Primary | ICD-10-CM

## 2024-06-27 PROCEDURE — 99214 OFFICE O/P EST MOD 30 MIN: CPT | Performed by: INTERNAL MEDICINE

## 2024-06-27 PROCEDURE — 3044F HG A1C LEVEL LT 7.0%: CPT | Performed by: INTERNAL MEDICINE

## 2024-06-27 PROCEDURE — 95251 CONT GLUC MNTR ANALYSIS I&R: CPT | Performed by: INTERNAL MEDICINE

## 2024-06-27 RX ORDER — PEN NEEDLE, DIABETIC 32GX 5/32"
NEEDLE, DISPOSABLE MISCELLANEOUS DAILY
COMMUNITY
End: 2024-06-27 | Stop reason: SDUPTHER

## 2024-06-27 RX ORDER — PEN NEEDLE, DIABETIC 32GX 5/32"
NEEDLE, DISPOSABLE MISCELLANEOUS
Qty: 400 EACH | Refills: 3 | Status: SHIPPED | OUTPATIENT
Start: 2024-06-27

## 2024-06-27 NOTE — PROGRESS NOTES
Chief Complaint   Patient presents with    Diabetes     Pcp and pharmacy verified     History of Present Illness: Sona Regan is a 58 y.o. female here for follow up of diabetes.  Pt notes that over Memorial day weekend (2017)  she \"came down with a Virus and sinus infection\" She was put on Abx and she noted polyuria, polydipsia and blurred vision. She had also lost 10 pounds in a short period of time. She saw her eye doctor who noted swelling in her cornia (will request these records). The swelling improved but did not normalize, her eye doctor recommended she get labs drawn and her PCP pallavi labs, her BG was 300 and her PCP started her on Glipizide. She continued to have symptoms so she checked her BG on a glucometer said \"high\" so she went to the ED and her BG >600. Her A1C was 13.1%.  AB testing showed positive ANTONELLA and CRP.  At our initial visit in August 2017 I tested her for autoimmune DM   Her insulin, proinsulin and c-peptide were low normal and one of her 4 antibodies were positive. (ZN-8).  This fits with Auto-immune DM Type 1/SADIQ.    \"I have been going through more episodes of frequent illnesses. In May I had a sinus infection, an ear infection and pre-bronchitis and I just finished a round of Abx for another ear infection.\"    She did have the dental work done. She has the extraction in December 2023 and then had bone grafting in January 2024. \"It did not take, because of all the infections and the oral surgeon said that the bone is not ready so the implant is no loner an option, so we would have to do a bridge and 3 crowns.\"    She is taking Lantus 18 units every morning and Novolog 1:10 carb ratio and 1:50 correction.    \"I had an allergic reaction to the DexCom 7 and I am trying the Jake 3 now, which seems to be doing ok.\"    I reviewed her last 30 days of CGM data.  Her FBG have been in the 140-160s with afternoon BG in the 160-180s, pre dinner in the 150-170s and HS in the 150-160s.  She has

## 2024-07-24 ENCOUNTER — TELEPHONE (OUTPATIENT)
Age: 59
End: 2024-07-24

## 2024-07-24 NOTE — TELEPHONE ENCOUNTER
Spoke with patient. She is asking if we had received an approval from insurance for the FreeStyle makenzie 3 sensors. Advised, per [facility], that they did receive a paid claim, but is over $200 per 28 days. Recommended to try [employer] for a coupon to help with the cost. Patient expressed understanding.

## 2024-08-23 RX ORDER — INSULIN ASPART 100 [IU]/ML
INJECTION, SOLUTION INTRAVENOUS; SUBCUTANEOUS
Qty: 60 ML | Refills: 1 | Status: SHIPPED | OUTPATIENT
Start: 2024-08-23

## 2024-08-23 RX ORDER — BLOOD SUGAR DIAGNOSTIC
STRIP MISCELLANEOUS
Qty: 400 STRIP | Refills: 3 | Status: SHIPPED | OUTPATIENT
Start: 2024-08-23

## 2024-11-08 ENCOUNTER — OFFICE VISIT (OUTPATIENT)
Age: 59
End: 2024-11-08

## 2024-11-08 VITALS
WEIGHT: 145.6 LBS | HEART RATE: 67 BPM | BODY MASS INDEX: 24.26 KG/M2 | SYSTOLIC BLOOD PRESSURE: 114 MMHG | DIASTOLIC BLOOD PRESSURE: 70 MMHG | HEIGHT: 65 IN

## 2024-11-08 DIAGNOSIS — E10.9 TYPE 1 DIABETES MELLITUS WITHOUT COMPLICATIONS (HCC): Primary | ICD-10-CM

## 2024-11-08 LAB — HBA1C MFR BLD: 6.8 %

## 2024-11-08 RX ORDER — BLOOD-GLUCOSE SENSOR
EACH MISCELLANEOUS
Qty: 6 EACH | Refills: 3 | Status: SHIPPED | OUTPATIENT
Start: 2024-11-08

## 2024-11-08 NOTE — PROGRESS NOTES
Chief Complaint   Patient presents with    Diabetes     Pcp and pharmacy verified     History of Present Illness: Sona Regan is a 58 y.o. female here for follow up of diabetes.  Pt notes that over Memorial day weekend (2017)  she \"came down with a Virus and sinus infection\" She was put on Abx and she noted polyuria, polydipsia and blurred vision. She had also lost 10 pounds in a short period of time. She saw her eye doctor who noted swelling in her cornia (will request these records). The swelling improved but did not normalize, her eye doctor recommended she get labs drawn and her PCP pallavi labs, her BG was 300 and her PCP started her on Glipizide. She continued to have symptoms so she checked her BG on a glucometer said \"high\" so she went to the ED and her BG >600. Her A1C was 13.1%.  AB testing showed positive ANTONELLA and CRP.  At our initial visit in August 2017 I tested her for autoimmune DM   Her insulin, proinsulin and c-peptide were low normal and one of her 4 antibodies were positive. (ZN-8).  This fits with Auto-immune DM Type 1/SADIQ.    At our last visit in June 2024 her A1C was 6.9%. Pt was instructed to continue the Lantus 18 units per day and continue the Novolog 1:10 carb ratio with her meals.    \"I just got back from my 2 week trip to [place]. My BG were great, but I had a great time. I am still trying to readjust to the time changes.\"    Pt denies any recent illnesses, injuries or hospitalizations.    She is taking Lantus 18 units every morning and Novolog 1:10 carb ratio and 1:50 correction.    \"I had an allergic reaction to the DexCom 7 and I am using the Jake 3 now, which seems to be doing ok.\"    I reviewed her last 30 days of CGM data.  Her FBG have been in the 150-170s with afternoon BG in the 170-190s, pre-dinner in the 170-180ss and HS in the 170-190s.  In the last 30 days, she has had 3 episodes of hypoglycemia.    Her A1C today was 6.8%.    Pt was diagnosed with breast cancer, in her

## 2024-11-11 ENCOUNTER — OFFICE VISIT (OUTPATIENT)
Age: 59
End: 2024-11-11
Payer: COMMERCIAL

## 2024-11-11 VITALS — WEIGHT: 145 LBS | BODY MASS INDEX: 24.16 KG/M2 | HEIGHT: 65 IN

## 2024-11-11 DIAGNOSIS — Z90.13 STATUS POST BILATERAL MASTECTOMY: Primary | ICD-10-CM

## 2024-11-11 DIAGNOSIS — Z85.3 PERSONAL HISTORY OF MALIGNANT NEOPLASM OF BREAST: ICD-10-CM

## 2024-11-11 PROCEDURE — 99213 OFFICE O/P EST LOW 20 MIN: CPT | Performed by: NURSE PRACTITIONER

## 2024-11-11 NOTE — PROGRESS NOTES
HISTORY OF PRESENT ILLNESS  Sona Regan is a 58 y.o. female     HPI  Established patient presents for follow-up to RIGHT breast cancer.  No breast concerns and no pain.          Breast history -   Referring - [place]  2020: R breast biopsy- 12:00 O clock- DCIS - ER and CT negative, 10: 00 O clock DCIS  10/27/2020: Bilateral skin sparing  mastectomy - Dr. Dasilva and Dr. Manrique   DCIS and microinvasive breast cancer. 2 negative nodes, ER 10%, CT negative  T1miN0  2020 - consult with Dr. Morris - no adjuvant therapy        Family history -  Paternal GM had breast cancer, diagnosed late 60's.  Maternal aunt, 50's  Maternal aunt, 50's        OB History    No obstetric history on file.      Obstetric Comments   Menarche:  17.   LMP: 2017.  # of Children:  2.  Age at Delivery of First Child:  31.   Hysterectomy/oophorectomy:  NO/NO.  Breast Bx:  No prior.  Hx of Breast Feeding:  yes.  BCP:  yes. Hormone therapy:  no.                    Past Surgical History:   Procedure Laterality Date    APPENDECTOMY      BREAST RECONSTRUCTION Bilateral 10/27/2020    BILATERAL BREAST RECONSTRUCTION WITH GEL IMPLANTS performed by Joshua Manrique MD at [facility]    BREAST SURGERY  2008    BREAST AUGMENTATION     SECTION  ,    COLONOSCOPY      IMPLANT BREAST SILICONE/EQ Bilateral     2008    MASTECTOMY Bilateral 10/27/2020    BILATERAL SKIN & NIPPLE SPARING MASTECTOMIES performed by Mariya Dasilva MD at [facility]    MASTECTOMY, BILATERAL  10/2020    OTHER SURGICAL HISTORY      MOHS PROCEDURE X 3    US BREAST BIOPSY NEEDLE ADDITIONAL RIGHT Right 2020    US BREAST BIOPSY NEEDLE ADDITIONAL RIGHT 2020 [facility]    US BREAST BIOPSY W LOC DEVICE 1ST LESION RIGHT Right 2020    US BREAST NEEDLE BIOPSY RIGHT 2020 [facility]               Review of Systems      Physical Exam  Constitutional:       Appearance: Normal appearance.   Chest:   Breasts:     Right: Absent.

## 2024-12-25 ENCOUNTER — PATIENT MESSAGE (OUTPATIENT)
Age: 59
End: 2024-12-25

## 2024-12-26 RX ORDER — ACYCLOVIR 400 MG/1
TABLET ORAL
Qty: 3 EACH | Refills: 2 | Status: SHIPPED | OUTPATIENT
Start: 2024-12-26

## 2025-01-20 RX ORDER — INSULIN GLARGINE 100 [IU]/ML
INJECTION, SOLUTION SUBCUTANEOUS
Qty: 45 ML | Refills: 1 | Status: SHIPPED | OUTPATIENT
Start: 2025-01-20

## 2025-02-28 DIAGNOSIS — E10.9 TYPE 1 DIABETES MELLITUS WITHOUT COMPLICATIONS (HCC): ICD-10-CM

## 2025-03-01 DIAGNOSIS — E10.9 TYPE 1 DIABETES MELLITUS WITHOUT COMPLICATIONS (HCC): ICD-10-CM

## 2025-03-01 LAB — HBA1C MFR BLD: 7 % (ref 4.8–5.6)

## 2025-03-02 LAB
ALBUMIN/CREAT UR: 8 MG/G CREAT (ref 0–29)
CREAT UR-MCNC: 107.3 MG/DL
MICROALBUMIN UR-MCNC: 8.7 UG/ML

## 2025-03-04 LAB
ALBUMIN SERPL-MCNC: 4.3 G/DL (ref 3.8–4.9)
ALP SERPL-CCNC: 68 IU/L (ref 44–121)
ALT SERPL-CCNC: 17 IU/L (ref 0–32)
AST SERPL-CCNC: 22 IU/L (ref 0–40)
BILIRUB SERPL-MCNC: 0.4 MG/DL (ref 0–1.2)
BUN SERPL-MCNC: 11 MG/DL (ref 6–24)
BUN/CREAT SERPL: 11 (ref 9–23)
CALCIUM SERPL-MCNC: 9.4 MG/DL (ref 8.7–10.2)
CHLORIDE SERPL-SCNC: 100 MMOL/L (ref 96–106)
CO2 SERPL-SCNC: 20 MMOL/L (ref 20–29)
CREAT SERPL-MCNC: 0.98 MG/DL (ref 0.57–1)
EGFRCR SERPLBLD CKD-EPI 2021: 67 ML/MIN/1.73
GLOBULIN SER CALC-MCNC: 2.4 G/DL (ref 1.5–4.5)
GLUCOSE SERPL-MCNC: 96 MG/DL (ref 70–99)
POTASSIUM SERPL-SCNC: 4.4 MMOL/L (ref 3.5–5.2)
PROT SERPL-MCNC: 6.7 G/DL (ref 6–8.5)
SODIUM SERPL-SCNC: 137 MMOL/L (ref 134–144)

## 2025-03-20 ENCOUNTER — OFFICE VISIT (OUTPATIENT)
Age: 60
End: 2025-03-20

## 2025-03-20 VITALS
SYSTOLIC BLOOD PRESSURE: 104 MMHG | HEIGHT: 65 IN | DIASTOLIC BLOOD PRESSURE: 60 MMHG | WEIGHT: 141.8 LBS | BODY MASS INDEX: 23.63 KG/M2 | HEART RATE: 64 BPM

## 2025-03-20 DIAGNOSIS — E10.9 TYPE 1 DIABETES MELLITUS WITHOUT COMPLICATIONS: Primary | ICD-10-CM

## 2025-03-20 RX ORDER — INSULIN GLARGINE 100 [IU]/ML
INJECTION, SOLUTION SUBCUTANEOUS
Qty: 45 ML | Refills: 1 | Status: SHIPPED | OUTPATIENT
Start: 2025-03-20

## 2025-03-20 RX ORDER — PEN NEEDLE, DIABETIC 32GX 5/32"
NEEDLE, DISPOSABLE MISCELLANEOUS
Qty: 400 EACH | Refills: 3 | Status: SHIPPED | OUTPATIENT
Start: 2025-03-20

## 2025-03-20 RX ORDER — ACYCLOVIR 400 MG/1
TABLET ORAL
Qty: 9 EACH | Refills: 3 | Status: SHIPPED | OUTPATIENT
Start: 2025-03-20

## 2025-03-20 RX ORDER — INSULIN ASPART 100 [IU]/ML
INJECTION, SOLUTION INTRAVENOUS; SUBCUTANEOUS
Qty: 60 ML | Refills: 1 | Status: SHIPPED | OUTPATIENT
Start: 2025-03-20

## 2025-03-20 NOTE — PROGRESS NOTES
Chief Complaint   Patient presents with    Diabetes     Pcp and pharmacy verified     History of Present Illness: Sona Regan is a 59 y.o. female here for follow up of diabetes.  Pt notes that over Memorial day weekend (2017)  she \"came down with a Virus and sinus infection\" She was put on Abx and she noted polyuria, polydipsia and blurred vision. She had also lost 10 pounds in a short period of time. She saw her eye doctor who noted swelling in her cornia (will request these records). The swelling improved but did not normalize, her eye doctor recommended she get labs drawn and her PCP pallavi labs, her BG was 300 and her PCP started her on Glipizide. She continued to have symptoms so she checked her BG on a glucometer said \"high\" so she went to the ED and her BG >600. Her A1C was 13.1%.  AB testing showed positive ANTONELLA and CRP.  At our initial visit in August 2017 I tested her for autoimmune DM   Her insulin, proinsulin and c-peptide were low normal and one of her 4 antibodies were positive. (ZN-8).  This fits with Auto-immune DM Type 1/SADIQ.    At our visit in June 2024 her A1C was 6.9%. Pt was instructed to continue the Lantus 18 units per day and continue the Novolog 1:10 carb ratio with her meals.    \"I had pink eye 3 weeks ago and I am now dealing with a rash.\"    She is taking Lantus 19 units every morning and Novolog 1:8 carb ratio and 1:50 correction.    Pt is back on the DexCom G7.    I reviewed her last 30 days of CGM data.      She notes that because she has been more sedentary she has needed more bolus and correction.     Pt was diagnosed with breast cancer, in her right breast in October 2020, she opted for bilateral mastectomy.  She was taken to the OR by Dr. Mariya Dasilva. The surgical pathology found 2 foci of microinvasion in the left breast as well.  She is still following with Dr. Dasilva every 6 months. \"I saw her in October 2024\"     - Pt wakes around 5AM and going to work out in the morning

## 2025-03-20 NOTE — PATIENT INSTRUCTIONS
1) Lantus: Continue the 19 units every morning.    2) Novolog: Continue the 1 unit for every 8 carbs.    3) Novolog Correction:  121-150: 1 extra unit  151-180 2 extra units  181-210 3 extra units  211-240 4 extra units  241-270 5 extra units  271-300 6 extra units  301-330 7 extra units  331-360 8 extra units  361-390 9 extra units  391-420 10 extra units  421-450 11 extra units  450+ 12 extra units

## 2025-04-11 NOTE — PROGRESS NOTES
Chief Complaint   Patient presents with    Diabetes     pcp and pharmacy verified. Release signed for eye exam.   Records since last visit reviewed  History of Present Illness: Felicia Martin is a 50 y.o. female here for follow up of diabetes. Pt notes that over [address] [facility] day weekend (2017)  she \"came down with a Virus and sinus infection\" She was put on Abx and she noted polyuria, polydipsia and blurred vision. She had also lost 10 pounds in a short period of time. She saw her eye doctor who noted swelling in her cornia (will request these records). The swelling improved but did not normalize, her eye doctor recommended she get labs drawn and her PCP cecilia labs, her BG was 300 and her PCP started her on Glipizide. She continued to have symptoms so she checked her BG on a glucometer said \"high\" so she went to the ED and her BG >600. Her A1C was 13.1%. AB testing showed positive [place] and CRP. At our initial visit in August 2017 I tested her for autoimmune DM   Her insulin, proinsulin and c-peptide were low normal and one of her 4 antibodies were positive. (ZN-8). This fits with Auto-immune DM Type 1/NII. At our last visit in February 2018 her A1C was 5.1% on Basaglar 6 units daily and Novolog 1:15 carb ratio. Her A1C last week was 5.3% with LDL 84 and Urine MA/Cr not elevated. She notes that in early May she come down with a sinus infection requiring ABX and cough medicine. She is still taking Basaglar 6 units daily and Novolog 1:15 carb ratio. Pt notes she does not eat three meals, but tends to \"graze\" during the day. She eats every 2 hours and will cover her carbs as needed. She will have dinner around 5-6PM.  She is learning what types of foods and carbohydrates effect her blood sugars. She notes that if she has a glass of red with dinner, her FBG the next day she may have a lower blood sugar. But she is not having sugars under 60.      Exercise consists of treadmill/cardio for 30 minutes most mornings. If her BGs are under 80 she does not work out in the morning, because she notes that if she works out her BGs will drop. She also has lots of house and yard work. No history of vascular disease. No history of neuropathy, or nephropathy. Last eye exam was January 2018, no retinopathy. She notes her vision has improved. Current Outpatient Prescriptions   Medication Sig    glucose blood VI test strips (ONETOUCH VERIO) strip Check blood sugar up to 4 times per day    Lancets (ONETOUCH ULTRASOFT LANCETS) misc Check blood sugar up to 4 times per day.  insulin glargine (BASAGLAR KWIKPEN) 100 unit/mL (3 mL) inpn 6 units daily    glucose blood VI test strips (ONETOUCH VERIO) strip Check blood sugars 4 times per day    NOVOLOG FLEXPEN 100 unit/mL inpn Inject 2 units up to 3 times daily with higher carb meal.  Max 50 units per day--replaces humalog    HYUN PEN NEEDLE 32 gauge x 5/32\" ndle     lancets (BD ULTRA FINE LANCETS) 33 gauge misc Check blood sugar 5 times per day    multivitamin (ONE A DAY) tablet Take 1 Tab by mouth daily. No current facility-administered medications for this visit. Allergies   Allergen Reactions    Tree Nut Anaphylaxis     Not true allergy. Peanut sensitivity via allergy testing     Review of Systems:  - Eyes: no blurry vision or double vision  - Cardiovascular: no chest pain  - Respiratory: no shortness of breath  - Musculoskeletal: no myalgias  - Neurological: no numbness/tingling in extremities    Physical Examination:  Blood pressure 115/76, pulse 64, height 5' 5\" (1.651 m), weight 128 lb 12.8 oz (58.4 kg).   - General: pleasant, no distress, good eye contact   - Neck: no carotid bruits  - Cardiovascular: regular, normal rate, nl s1 and s2, no m/r/g, 2+ DP pulses   - Respiratory: clear bilaterally  - Integumentary: no edema, no foot ulcers, sensation to monofilament and vibration intact bilaterally  - Psychiatric: normal mood and affect    Data Reviewed:   Component      Latest Ref Rng & Units 5/10/2018 5/10/2018 5/10/2018 5/10/2018           9:01 AM  9:01 AM  9:01 AM  9:01 AM   Glucose      65 - 99 mg/dL  74     BUN      6 - 24 mg/dL  8     Creatinine      0.57 - 1.00 mg/dL  0.69     GFR est non-AA      >59 mL/min/1.73  102     GFR est AA      >59 mL/min/1.73  117     BUN/Creatinine ratio      9 - 23  12     Sodium      134 - 144 mmol/L  143     Potassium      3.5 - 5.2 mmol/L  4.9     Chloride      96 - 106 mmol/L  104     CO2      18 - 29 mmol/L  28     Calcium      8.7 - 10.2 mg/dL  9.2     Protein, total      6.0 - 8.5 g/dL  6.5     Albumin      3.5 - 5.5 g/dL  4.0     GLOBULIN, TOTAL      1.5 - 4.5 g/dL  2.5     A-G Ratio      1.2 - 2.2  1.6     Bilirubin, total      0.0 - 1.2 mg/dL  0.4     Alk. phosphatase      39 - 117 IU/L  50     AST      0 - 40 IU/L  26     ALT (SGPT)      0 - 32 IU/L  17     Cholesterol, total      100 - 199 mg/dL 174      Triglyceride      0 - 149 mg/dL 33      HDL Cholesterol      >39 mg/dL 83      VLDL, calculated      5 - 40 mg/dL 7      LDL, calculated      0 - 99 mg/dL 84      Creatinine, urine      Not Estab. mg/dL   92.0    Microalbumin, urine      Not Estab. ug/mL   8.2    Microalbumin/Creat. Ratio      0.0 - 30.0 mg/g creat   8.9    Hemoglobin A1c, (calculated)      4.8 - 5.6 %    5.3   Estimated average glucose      mg/dL    105       Assessment/Plan:   1) DM > Her A1C last week was 5.3%. Pt encouraged to continue the Basaglar 6 units daily and Novolog 1:15 carb ratio. Pt to check her BGs AC/HS and mail her BG logs to me in 6 weeks. BP at goal on no HTN agents. Pt voices understanding and agreement with the plan. RTC 3 months       Follow-up Disposition:  Return in about 3 months (around 8/21/2018).     Copy sent to:  Dr. Raquel Kinney DISCHARGE

## 2025-08-13 ENCOUNTER — HOSPITAL ENCOUNTER (OUTPATIENT)
Facility: HOSPITAL | Age: 60
Discharge: HOME | End: 2025-08-16

## 2025-08-13 DIAGNOSIS — E10.9 TYPE 1 DIABETES MELLITUS WITHOUT COMPLICATIONS (HCC): ICD-10-CM

## 2025-08-13 LAB
ALBUMIN SERPL-MCNC: 3.9 G/DL (ref 3.5–5.2)
ALBUMIN/GLOB SERPL: 1.4 (ref 1.1–2.2)
ALP SERPL-CCNC: 69 U/L (ref 35–104)
ALT SERPL-CCNC: 95 U/L (ref 10–35)
ANION GAP SERPL CALC-SCNC: 10 MMOL/L (ref 2–14)
AST SERPL-CCNC: 64 U/L (ref 10–35)
BILIRUB SERPL-MCNC: 0.6 MG/DL (ref 0–1.2)
BUN SERPL-MCNC: 12 MG/DL (ref 6–20)
BUN/CREAT SERPL: 16 (ref 12–20)
CALCIUM SERPL-MCNC: 9 MG/DL (ref 8.6–10)
CHLORIDE SERPL-SCNC: 104 MMOL/L (ref 98–107)
CHOLEST SERPL-MCNC: 212 MG/DL (ref 0–200)
CO2 SERPL-SCNC: 28 MMOL/L (ref 20–29)
CREAT SERPL-MCNC: 0.75 MG/DL (ref 0.6–1)
EST. AVERAGE GLUCOSE BLD GHB EST-MCNC: 129 MG/DL
GLOBULIN SER CALC-MCNC: 2.8 G/DL (ref 2–4)
GLUCOSE SERPL-MCNC: 72 MG/DL (ref 65–100)
HBA1C MFR BLD: 6.1 % (ref 4–5.6)
HDLC SERPL-MCNC: 106 MG/DL (ref 40–60)
HDLC SERPL: 2 (ref 0–5)
LDLC SERPL CALC-MCNC: 97 MG/DL (ref 0–100)
POTASSIUM SERPL-SCNC: 4.2 MMOL/L (ref 3.5–5.1)
PROT SERPL-MCNC: 6.7 G/DL (ref 6.4–8.3)
SODIUM SERPL-SCNC: 141 MMOL/L (ref 136–145)
TRIGL SERPL-MCNC: 47 MG/DL (ref 0–150)
TSH, 3RD GENERATION: 2.48 UIU/ML (ref 0.27–4.2)
VLDLC SERPL CALC-MCNC: 9 MG/DL

## (undated) DEVICE — INFECTION CONTROL KIT SYS

## (undated) DEVICE — PAD,ABDOMINAL,5"X9",ST,LF,25/BX: Brand: MEDLINE INDUSTRIES, INC.

## (undated) DEVICE — SUTURE PDS II SZ 2-0 L27IN ABSRB VLT SH L26MM 1/2 CIR Z317H

## (undated) DEVICE — SPONGE LAP 18X18IN STRL -- 5/PK

## (undated) DEVICE — SOL IRR SOD CL 0.9% 1000ML BTL --

## (undated) DEVICE — Device

## (undated) DEVICE — BRA SURG POST-OP LG 42IN --

## (undated) DEVICE — STRIP,CLOSURE,WOUND,MEDI-STRIP,1/2X4: Brand: MEDLINE

## (undated) DEVICE — SUTURE ETHLN SZ 2-0 L18IN NONABSORBABLE BLK L26MM FS 3/8 664G

## (undated) DEVICE — SOLUTION SCRB 2OZ 10% POVIDONE IOD ANTIMIC BTL

## (undated) DEVICE — COLLECTION SET 21GA 1.25IN --

## (undated) DEVICE — DRAPE,REIN 53X77,STERILE: Brand: MEDLINE

## (undated) DEVICE — CURVED, SMALL JAW, OPEN SEALER/DIVIDER: Brand: LIGASURE

## (undated) DEVICE — COVER US PRB W12XL244CM FLD IORT STR TIP

## (undated) DEVICE — STERILE POLYISOPRENE POWDER-FREE SURGICAL GLOVES WITH EMOLLIENT COATING: Brand: PROTEXIS

## (undated) DEVICE — PENCIL SMK EVAC L10FT DIA95MM TBNG NONSTICK W ADPT TO 22MM

## (undated) DEVICE — NEEDLE HYPO 22GA L1.5IN BLK S STL HUB POLYPR SHLD REG BVL

## (undated) DEVICE — SUT SLK 2-0SH 30IN BLK --

## (undated) DEVICE — WRAP SURG W1.31XL1.34M CARD FOR PT 165-172CM THERMOWRP

## (undated) DEVICE — PACK,BASIC,SIRUS,V: Brand: MEDLINE

## (undated) DEVICE — SPONGE GZ W4XL4IN COT 12 PLY TYP VII WVN C FLD DSGN

## (undated) DEVICE — SURGICAL PROCEDURE PACK BASIN MAJ SET CUST NO CAUT

## (undated) DEVICE — DRESSING,GAUZE,XEROFORM,CURAD,1"X8",ST: Brand: CURAD

## (undated) DEVICE — SENSOR TEMP SKIN DISP

## (undated) DEVICE — HANDLE LT SNAP ON ULT DURABLE LENS FOR TRUMPF ALC DISPOSABLE

## (undated) DEVICE — SUTURE PERMA-HAND SZ 3-0 L18IN NONABSORBABLE BLK L24MM PS-1 1684G

## (undated) DEVICE — DEVICE SECUREMENT 1/32IN POLYETH FOAM F ANCHR URIN CATH

## (undated) DEVICE — UNDERPAD INCON STD 36X23IN --

## (undated) DEVICE — TOTAL TRAY, DB, 100% SILI FOLEY, 16FR 10: Brand: MEDLINE

## (undated) DEVICE — Z DISCONTINUED NO SUB IDED GLOVE SURG SZ 6 L12IN FNGR THK13MIL WHT ISOLEX POLYISOPRENE

## (undated) DEVICE — PREP SKN CHLRAPRP APL 26ML STR --

## (undated) DEVICE — GARMENT,MEDLINE,DVT,INT,CALF,MED, GEN2: Brand: MEDLINE

## (undated) DEVICE — NEEDLE HYPO 22GA L1.5IN BLK POLYPR HUB S STL REG BVL STR

## (undated) DEVICE — RETRACTOR 50-101-1 RADIALUX US: Brand: RADIALUX™

## (undated) DEVICE — SEALER TISS L14CM DIA5MM ADV BPLR CRV TIP OPN APPRCH ENSEAL

## (undated) DEVICE — SUTURE MCRYL SZ 3-0 L27IN ABSRB UD L24MM PS-1 3/8 CIR PRIM Y936H

## (undated) DEVICE — TOWEL SURG W17XL27IN STD BLU COT NONFENESTRATED PREWASHED

## (undated) DEVICE — SUTURE MCRYL SZ 4-0 L27IN ABSRB UD L19MM PS-2 1/2 CIR PRIM Y426H

## (undated) DEVICE — DERMABOND SKIN ADH 0.7ML -- DERMABOND ADVANCED 12/BX

## (undated) DEVICE — DRAIN SURG 15FR SIL RND CHN W/ TRCR FULL FLUT DBL WRP TRAD

## (undated) DEVICE — INTENDED FOR TISSUE SEPARATION, AND OTHER PROCEDURES THAT REQUIRE A SHARP SURGICAL BLADE TO PUNCTURE OR CUT.: Brand: BARD-PARKER ® CARBON RIB-BACK BLADES

## (undated) DEVICE — SYR 10ML LUER LOK 1/5ML GRAD --

## (undated) DEVICE — TRAY PREP DRY W/ PREM GLV 2 APPL 6 SPNG 2 UNDPD 1 OVERWRAP

## (undated) DEVICE — RESERVOIR,SUCTION,100CC,SILICONE: Brand: MEDLINE

## (undated) DEVICE — SOLUTION IV 1000ML 0.9% SOD CHL

## (undated) DEVICE — NEEDLE HYPO 25GA L1.5IN BLU POLYPR HUB S STL REG BVL STR

## (undated) DEVICE — SUTURE PDS II SZ 3-0 L27IN ABSRB VLT L26MM SH 1/2 CIR Z316H

## (undated) DEVICE — INSULATED BLADE ELECTRODE;CAUTION: FOR MANUFACTURING, PROCESSING, OR REPACKING.: Brand: EDGE

## (undated) DEVICE — TAPE ADH W1INXL10YD PLAS TRNSPAR H2O RESIST HYPOALRG CURAD

## (undated) DEVICE — SYSTEM IMPL DEL FOR BRST IMPL FUN (SEE COMMENT)

## (undated) DEVICE — DRAPE,CHEST,FENES,15X10,STERIL: Brand: MEDLINE

## (undated) DEVICE — MASTISOL ADHESIVE LIQ 2/3ML

## (undated) DEVICE — REM POLYHESIVE ADULT PATIENT RETURN ELECTRODE: Brand: VALLEYLAB

## (undated) DEVICE — DRESSING,GAUZE,XEROFORM,CURAD,5"X9",ST: Brand: CURAD

## (undated) DEVICE — DECANTER BAG 9": Brand: MEDLINE INDUSTRIES, INC.

## (undated) DEVICE — INSULATED BLADE ELECTRODE: Brand: EDGE

## (undated) DEVICE — SUTURE PROL SZ 3-0 L18IN NONABSORBABLE BLU L19MM PC-5 3/8 8632G

## (undated) DEVICE — SYR 50ML LR LCK 1ML GRAD NSAF --